# Patient Record
Sex: FEMALE | Race: ASIAN | NOT HISPANIC OR LATINO | Employment: PART TIME | URBAN - METROPOLITAN AREA
[De-identification: names, ages, dates, MRNs, and addresses within clinical notes are randomized per-mention and may not be internally consistent; named-entity substitution may affect disease eponyms.]

---

## 2019-11-11 ENCOUNTER — OFFICE VISIT (OUTPATIENT)
Dept: GASTROENTEROLOGY | Facility: CLINIC | Age: 54
End: 2019-11-11
Payer: COMMERCIAL

## 2019-11-11 VITALS
DIASTOLIC BLOOD PRESSURE: 70 MMHG | WEIGHT: 116.8 LBS | BODY MASS INDEX: 19.94 KG/M2 | SYSTOLIC BLOOD PRESSURE: 118 MMHG | HEART RATE: 56 BPM | HEIGHT: 64 IN

## 2019-11-11 DIAGNOSIS — R05.3 CHRONIC COUGH: ICD-10-CM

## 2019-11-11 DIAGNOSIS — K21.00 GASTROESOPHAGEAL REFLUX DISEASE WITH ESOPHAGITIS: Primary | ICD-10-CM

## 2019-11-11 PROCEDURE — 99203 OFFICE O/P NEW LOW 30 MIN: CPT | Performed by: PHYSICIAN ASSISTANT

## 2019-11-11 RX ORDER — DIPHENOXYLATE HYDROCHLORIDE AND ATROPINE SULFATE 2.5; .025 MG/1; MG/1
1 TABLET ORAL DAILY
COMMUNITY

## 2019-11-11 RX ORDER — PANTOPRAZOLE SODIUM 40 MG/1
40 TABLET, DELAYED RELEASE ORAL DAILY
Qty: 30 TABLET | Refills: 1 | Status: SHIPPED | OUTPATIENT
Start: 2019-11-11 | End: 2022-06-27 | Stop reason: CLARIF

## 2019-11-11 RX ORDER — LATANOPROST 50 UG/ML
1 SOLUTION/ DROPS OPHTHALMIC
COMMUNITY

## 2019-11-11 NOTE — LETTER
November 13, 2019     Barb Robert MD  [address] [facility] [address]    Patient: Eze Billy   YOB: 1965   Date of Visit: 11/11/2019       Dear Dr Alexsandra Scott:    Thank you for referring Eze Billy to me for evaluation  Below are my notes for this consultation  If you have questions, please do not hesitate to call me  I look forward to following your patient along with you  Sincerely,        Vinita Randall PA-C        CC: No Recipients  Maurilio Hernandez  11/11/2019 10:09 AM  Attested  [facility]  Eze Billy 53 y o  female MRN: [id number]       CC:  New patient to establish for possible silent reflux    HPI: Leroy Alford is a 42-year-old female who presents to the office today by referral of her PCP for sour taste, chronic cough and throat clearing  Patient is unsure when the onset of her symptoms began  However, she reports this as a gradual progression  She reports that she has been having a sour taste in her mouth, which she tried taking hydrogen peroxide mouthwash for  Her dentist had told her to discontinue this as he noticed that her enamel was weakening  He also had some suspicion for acid reflux  Patient also reports history of chronic cough and occasional sore throat  She denies pyrosis  She has occasional NSAID use  No dysphagia or odynophagia  No unintentional weight loss  Patient has never had an EGD  Her last colonoscopy was with Dr Mundo Cuello about 3 years ago  To her knowledge, she does not believe that she has a personal history of polyps or family history of colon cancer  Review of Systems:    CONSTITUTIONAL: Denies any fever, chills, or rigors  Good appetite, and no recent weight loss  HEENT: No earache or tinnitus  Denies hearing loss or visual disturbances  CARDIOVASCULAR: No chest pain or palpitations     RESPIRATORY: Denies any cough, hemoptysis, shortness of breath or dyspnea on exertion  GASTROINTESTINAL: As noted in the History of Present Illness  GENITOURINARY: No problems with urination  Denies any hematuria or dysuria  NEUROLOGIC: No dizziness or vertigo, denies headaches  MUSCULOSKELETAL: Denies any muscle or joint pain  SKIN: Denies skin rashes or itching  ENDOCRINE: Denies excessive thirst  Denies intolerance to heat or cold  PSYCHOSOCIAL: Denies depression or anxiety  Denies any recent memory loss  Current Outpatient Medications   Medication Sig Dispense Refill    latanoprost (XALATAN) 0 005 % ophthalmic solution Apply 1 drop to eye      multivitamin (THERAGRAN) TABS Take 1 tablet by mouth daily      pantoprazole (PROTONIX) 40 mg tablet Take 1 tablet (40 mg total) by mouth daily 30 minutes before breakfast 30 tablet 1     No current facility-administered medications for this visit  History reviewed  No pertinent past medical history  History reviewed  No pertinent surgical history    Social History     Socioeconomic History    Marital status: /Civil Union     Spouse name: None    Number of children: None    Years of education: None    Highest education level: None   Occupational History    None   Social Needs    Financial resource strain: None    Food insecurity:     Worry: None     Inability: None    Transportation needs:     Medical: None     Non-medical: None   Tobacco Use    Smoking status: Never Smoker    Smokeless tobacco: Never Used   Substance and Sexual Activity    Alcohol use: Never     Frequency: Never    Drug use: Never    Sexual activity: None   Lifestyle    Physical activity:     Days per week: None     Minutes per session: None    Stress: None   Relationships    Social connections:     Talks on phone: None     Gets together: None     Attends Pentecostalism service: None     Active member of club or organization: None     Attends meetings of clubs or organizations: None     Relationship status: None    Intimate partner violence:     Fear of current or ex partner: None     Emotionally abused: None     Physically abused: None     Forced sexual activity: None   Other Topics Concern    None   Social History Narrative    None     Family History   Problem Relation Age of Onset    No Known Problems Mother     No Known Problems Father             PHYSICAL EXAM:    Vitals:    11/11/19 0909   BP: 118/70   Pulse: 56   Weight: 53 kg (116 lb 12 8 oz)   Height: 5' 4" (1 626 m)     General Appearance:   Alert and oriented x 3  Cooperative, and in no respiratory distress   HEENT:   Normocephalic, atraumatic, anicteric      Neck:  Supple, symmetrical, trachea midline   Lungs:   Clear to auscultation bilaterally    Heart[de-identified]   Regular rate and rhythm   Abdomen:   Soft, non-tender, non-distended; normal bowel sounds; no masses, no organomegaly    Genitalia:   Deferred    Rectal:   Deferred    Extremities:  No cyanosis, clubbing or edema    Pulses:  2+ and symmetric all extremities    Skin:  Skin color, texture, turgor normal, no rashes or lesions    Lymph nodes:  No palpable cervical or supraclavicular lymphadenopathy        Lab Results:             Invalid input(s): LABALBU            Imaging Studies: I have personally reviewed pertinent imaging studies  Patient was never admitted  ASSESSMENT and PLAN:      1) Sour taste, throat clearing and chronic cough - May be secondary to silent GERD symptoms/LPR  Patient also reports that she [place] often  No alarm symptoms currently  - Will start a PPI course x8 weeks  - EGD to investigate, and also biopsy for H pylori  - Reflux precautions discussed  - May need referral to ENT        Follow up after EGD  Attestation signed by Christy Luu MD at 11/11/2019 10:10 AM:  I supervised the Advanced Practitioner  I reviewed the Advanced Practitioner note and agree      Christy Luu MD 11/11/19

## 2019-11-11 NOTE — PROGRESS NOTES
[facility]  Anusha Lalo 53 y o  female MRN: [id number]       CC:  New patient to establish for possible silent reflux    HPI: Jarred Orr is a 76-year-old female who presents to the office today by referral of her PCP for sour taste, chronic cough and throat clearing  Patient is unsure when the onset of her symptoms began  However, she reports this as a gradual progression  She reports that she has been having a sour taste in her mouth, which she tried taking hydrogen peroxide mouthwash for  Her dentist had told her to discontinue this as he noticed that her enamel was weakening  He also had some suspicion for acid reflux  Patient also reports history of chronic cough and occasional sore throat  She denies pyrosis  She has occasional NSAID use  No dysphagia or odynophagia  No unintentional weight loss  Patient has never had an EGD  Her last colonoscopy was with Dr Gladys Birch about 3 years ago  To her knowledge, she does not believe that she has a personal history of polyps or family history of colon cancer  Review of Systems:    CONSTITUTIONAL: Denies any fever, chills, or rigors  Good appetite, and no recent weight loss  HEENT: No earache or tinnitus  Denies hearing loss or visual disturbances  CARDIOVASCULAR: No chest pain or palpitations  RESPIRATORY: Denies any cough, hemoptysis, shortness of breath or dyspnea on exertion  GASTROINTESTINAL: As noted in the History of Present Illness  GENITOURINARY: No problems with urination  Denies any hematuria or dysuria  NEUROLOGIC: No dizziness or vertigo, denies headaches  MUSCULOSKELETAL: Denies any muscle or joint pain  SKIN: Denies skin rashes or itching  ENDOCRINE: Denies excessive thirst  Denies intolerance to heat or cold  PSYCHOSOCIAL: Denies depression or anxiety  Denies any recent memory loss         Current Outpatient Medications   Medication Sig Dispense Refill    latanoprost (XALATAN) 0 005 % ophthalmic solution Apply 1 drop to eye      multivitamin (THERAGRAN) TABS Take 1 tablet by mouth daily      pantoprazole (PROTONIX) 40 mg tablet Take 1 tablet (40 mg total) by mouth daily 30 minutes before breakfast 30 tablet 1     No current facility-administered medications for this visit  History reviewed  No pertinent past medical history  History reviewed  No pertinent surgical history  Social History     Socioeconomic History    Marital status: /Civil Union     Spouse name: None    Number of children: None    Years of education: None    Highest education level: None   Occupational History    None   Social Needs    Financial resource strain: None    Food insecurity:     Worry: None     Inability: None    Transportation needs:     Medical: None     Non-medical: None   Tobacco Use    Smoking status: Never Smoker    Smokeless tobacco: Never Used   Substance and Sexual Activity    Alcohol use: Never     Frequency: Never    Drug use: Never    Sexual activity: None   Lifestyle    Physical activity:     Days per week: None     Minutes per session: None    Stress: None   Relationships    Social connections:     Talks on phone: None     Gets together: None     Attends Confucianism service: None     Active member of club or organization: None     Attends meetings of clubs or organizations: None     Relationship status: None    Intimate partner violence:     Fear of current or ex partner: None     Emotionally abused: None     Physically abused: None     Forced sexual activity: None   Other Topics Concern    None   Social History Narrative    None     Family History   Problem Relation Age of Onset    No Known Problems Mother     No Known Problems Father             PHYSICAL EXAM:    Vitals:    11/11/19 0909   BP: 118/70   Pulse: 56   Weight: 53 kg (116 lb 12 8 oz)   Height: 5' 4" (1 626 m)     General Appearance:   Alert and oriented x 3   Cooperative, and in no respiratory distress   HEENT:   Normocephalic, atraumatic, anicteric      Neck:  Supple, symmetrical, trachea midline   Lungs:   Clear to auscultation bilaterally    Heart[de-identified]   Regular rate and rhythm   Abdomen:   Soft, non-tender, non-distended; normal bowel sounds; no masses, no organomegaly    Genitalia:   Deferred    Rectal:   Deferred    Extremities:  No cyanosis, clubbing or edema    Pulses:  2+ and symmetric all extremities    Skin:  Skin color, texture, turgor normal, no rashes or lesions    Lymph nodes:  No palpable cervical or supraclavicular lymphadenopathy        Lab Results:             Invalid input(s): LABALBU            Imaging Studies: I have personally reviewed pertinent imaging studies  Patient was never admitted  ASSESSMENT and PLAN:      1) Sour taste, throat clearing and chronic cough - May be secondary to silent GERD symptoms/LPR  Patient also reports that she [place] often  No alarm symptoms currently  - Will start a PPI course x8 weeks  - EGD to investigate, and also biopsy for H pylori  - Reflux precautions discussed  - May need referral to ENT        Follow up after EGD

## 2019-11-12 ENCOUNTER — TELEPHONE (OUTPATIENT)
Dept: GASTROENTEROLOGY | Facility: CLINIC | Age: 54
End: 2019-11-12

## 2021-01-26 ENCOUNTER — OFFICE VISIT (OUTPATIENT)
Dept: INTERNAL MEDICINE CLINIC | Facility: CLINIC | Age: 56
End: 2021-01-26
Payer: COMMERCIAL

## 2021-01-26 VITALS
SYSTOLIC BLOOD PRESSURE: 140 MMHG | HEART RATE: 95 BPM | WEIGHT: 122 LBS | DIASTOLIC BLOOD PRESSURE: 90 MMHG | HEIGHT: 65 IN | OXYGEN SATURATION: 98 % | TEMPERATURE: 98 F | BODY MASS INDEX: 20.33 KG/M2

## 2021-01-26 DIAGNOSIS — A46 ERYSIPELAS: Primary | ICD-10-CM

## 2021-01-26 PROCEDURE — 3725F SCREEN DEPRESSION PERFORMED: CPT | Performed by: INTERNAL MEDICINE

## 2021-01-26 PROCEDURE — 1036F TOBACCO NON-USER: CPT | Performed by: INTERNAL MEDICINE

## 2021-01-26 PROCEDURE — 99214 OFFICE O/P EST MOD 30 MIN: CPT | Performed by: INTERNAL MEDICINE

## 2021-01-26 PROCEDURE — 3008F BODY MASS INDEX DOCD: CPT | Performed by: INTERNAL MEDICINE

## 2021-01-26 RX ORDER — SULFAMETHOXAZOLE AND TRIMETHOPRIM 800; 160 MG/1; MG/1
1 TABLET ORAL EVERY 12 HOURS SCHEDULED
Qty: 14 TABLET | Refills: 0 | Status: SHIPPED | OUTPATIENT
Start: 2021-01-26 | End: 2021-02-02

## 2021-01-26 NOTE — PROGRESS NOTES
Assessment/Plan:       Diagnoses and all orders for this visit:    Erysipelas  -     sulfamethoxazole-trimethoprim (BACTRIM DS) 800-160 mg per tablet; Take 1 tablet by mouth every 12 (twelve) hours for 7 days  -     CBC and differential; Future  -     Comprehensive metabolic panel; Future  -     TSH, 3rd generation with Free T4 reflex; Future  -     UA (URINE) with reflex to Scope                Subjective:      Patient ID: Yesenia Oneal is a 63 y o  female  Rather abrupt onset over the past week of a tender facial cellulitis involving both right and left malar areas with out a distinct demarcation with and without a precipitant     No obvious precipitant  No exacerbating or relieving factor  Course has been to slowly get worse over the past week   No systemic symptoms   No prior history of anything similar  No history of autoimmune disease  The following portions of the patient's history were reviewed and updated as appropriate:   She has no past medical history on file  ,  does not have any pertinent problems on file  ,   has no past surgical history on file  ,  family history includes Alzheimer's disease in her father; No Known Problems in her mother  ,   reports that she has never smoked  She has never used smokeless tobacco  She reports that she does not drink alcohol or use drugs  ,  has No Known Allergies     Current Outpatient Medications   Medication Sig Dispense Refill    latanoprost (XALATAN) 0 005 % ophthalmic solution Apply 1 drop to eye      multivitamin (THERAGRAN) TABS Take 1 tablet by mouth daily      pantoprazole (PROTONIX) 40 mg tablet Take 1 tablet (40 mg total) by mouth daily 30 minutes before breakfast (Patient not taking: Reported on 1/26/2021) 30 tablet 1    sulfamethoxazole-trimethoprim (BACTRIM DS) 800-160 mg per tablet Take 1 tablet by mouth every 12 (twelve) hours for 7 days 14 tablet 0     No current facility-administered medications for this visit          Review of Systems   Skin: Positive for rash  All other systems reviewed and are negative  Objective:  Vitals:    01/26/21 1415   BP: 140/90   Pulse: 95   Temp: 98 °F (36 7 °C)   SpO2: 98%      Physical Exam  Constitutional:       Comments:   Alert female patient who appears to be the stated age   HENT:      Right Ear: External ear normal       Left Ear: External ear normal       Nose: Nose normal    Eyes:      General: No scleral icterus  Right eye: No discharge  Left eye: No discharge  Neck:      Musculoskeletal: Normal range of motion  Cardiovascular:      Rate and Rhythm: Normal rate and regular rhythm  Pulmonary:      Effort: Pulmonary effort is normal    Skin:     General: Skin is warm  Findings: Erythema and rash present  Comments:  Skin of the face both right and left of the nose is a bit red, irregular blotching, some raised areas without haily pustules  A bit thickened and indurated without haily discharge  There is noted distinct line of demarcation between normal tissue  This zone of redness extends as far out as the orbit does not reach the orbits themselves  Likewise, the bridge of the nose is not involved   Neurological:      General: No focal deficit present  Mental Status: She is alert  Psychiatric:         Mood and Affect: Mood normal          Judgment: Judgment normal            Patient Instructions    A facial cellulitis  Will treat with Bactrim twice a day    Patient more to go to ER things do not get better   Laboratory testing to look for leukocytosis, diabetes  Virtual visit next Wednesday

## 2021-01-26 NOTE — PATIENT INSTRUCTIONS
A facial cellulitis  Will treat with Bactrim twice a day    Patient more to go to ER things do not get better   Laboratory testing to look for leukocytosis, diabetes  Virtual visit next Wednesday

## 2021-01-27 ENCOUNTER — TELEPHONE (OUTPATIENT)
Dept: ADMINISTRATIVE | Facility: OTHER | Age: 56
End: 2021-01-27

## 2021-01-27 NOTE — TELEPHONE ENCOUNTER
Upon review of the In Basket request and the patient's chart, initial outreach has been made via fax, please see Contacts section for details       Thank you  Margarito Flynn

## 2021-01-27 NOTE — LETTER
Procedure Request Form: Colonoscopy      Date Requested: 21  Patient: Ledell Kins  Patient : 1965   Referring Provider: Mary Pierson, MD        Date of Procedure ______________________________       The above patient has informed us that they have completed their   most recent Colonoscopy at your facility  Please complete   this form and attach all corresponding procedure reports/results  Comments __________________________________________________________  ____________________________________________________________________  ____________________________________________________________________  ____________________________________________________________________    Facility Completing Procedure _________________________________________    Form Completed By (print name) _______________________________________      Signature __________________________________________________________      These reports are needed for  compliance    Please fax this completed form and a copy of the procedure report to our office located at [place] [address] as soon as possible to [phone] karin Cesar: Phone [phone]    We thank you for your assistance in treating our mutual patient

## 2021-01-27 NOTE — TELEPHONE ENCOUNTER
Upon review of the In Basket request we were able to locate, review, and update the patient chart as requested for Mammogram and Pap Smear (HPV) aka Cervical Cancer Screening  Any additional questions or concerns should be emailed to the Practice Liaisons via [email or web address] email, please do not reply via In Basket      Thank you  Francesca Burgos

## 2021-01-27 NOTE — TELEPHONE ENCOUNTER
----- Message from Sebastian Mccartney sent at 1/26/2021  2:20 PM EST -----  Regarding: colonoscopy  01/26/21 2:20 PM    Hello, our patient Min Conway has had CRC: Colonoscopy completed/performed  Please assist in updating the patient chart by making an External outreach to Dr Conchita Chang facility [facility]  The date of service is 1      Thank you,  WOJCIECH Mccartney 63

## 2021-02-04 ENCOUNTER — OFFICE VISIT (OUTPATIENT)
Dept: INTERNAL MEDICINE CLINIC | Facility: CLINIC | Age: 56
End: 2021-02-04
Payer: COMMERCIAL

## 2021-02-04 ENCOUNTER — APPOINTMENT (OUTPATIENT)
Dept: RADIOLOGY | Facility: CLINIC | Age: 56
End: 2021-02-04
Payer: COMMERCIAL

## 2021-02-04 ENCOUNTER — APPOINTMENT (OUTPATIENT)
Dept: LAB | Facility: CLINIC | Age: 56
End: 2021-02-04
Payer: COMMERCIAL

## 2021-02-04 VITALS
SYSTOLIC BLOOD PRESSURE: 116 MMHG | HEART RATE: 101 BPM | DIASTOLIC BLOOD PRESSURE: 72 MMHG | HEIGHT: 64 IN | BODY MASS INDEX: 20.55 KG/M2 | TEMPERATURE: 97.2 F | OXYGEN SATURATION: 97 % | WEIGHT: 120.4 LBS

## 2021-02-04 DIAGNOSIS — R05.3 CHRONIC COUGH: ICD-10-CM

## 2021-02-04 DIAGNOSIS — R73.9 HYPERGLYCEMIA: Primary | ICD-10-CM

## 2021-02-04 DIAGNOSIS — I78.1 TELANGIECTASIAS: ICD-10-CM

## 2021-02-04 DIAGNOSIS — R73.9 HYPERGLYCEMIA: ICD-10-CM

## 2021-02-04 PROBLEM — H35.073 IDIOPATHIC JUXTAFOVEAL RETINAL TELANGIECTASIA OF BOTH EYES: Status: ACTIVE | Noted: 2021-02-04

## 2021-02-04 LAB
APTT PPP: 29 SECONDS (ref 23–37)
EST. AVERAGE GLUCOSE BLD GHB EST-MCNC: 105 MG/DL
HBA1C MFR BLD: 5.3 %
INR PPP: 1.03 (ref 0.84–1.19)
PROTHROMBIN TIME: 13.5 SECONDS (ref 11.6–14.5)

## 2021-02-04 PROCEDURE — 85730 THROMBOPLASTIN TIME PARTIAL: CPT

## 2021-02-04 PROCEDURE — 85610 PROTHROMBIN TIME: CPT

## 2021-02-04 PROCEDURE — 3725F SCREEN DEPRESSION PERFORMED: CPT | Performed by: INTERNAL MEDICINE

## 2021-02-04 PROCEDURE — 71046 X-RAY EXAM CHEST 2 VIEWS: CPT

## 2021-02-04 PROCEDURE — 99214 OFFICE O/P EST MOD 30 MIN: CPT | Performed by: INTERNAL MEDICINE

## 2021-02-04 PROCEDURE — 36415 COLL VENOUS BLD VENIPUNCTURE: CPT

## 2021-02-04 PROCEDURE — 70220 X-RAY EXAM OF SINUSES: CPT

## 2021-02-04 PROCEDURE — 83036 HEMOGLOBIN GLYCOSYLATED A1C: CPT

## 2021-02-04 PROCEDURE — 3008F BODY MASS INDEX DOCD: CPT | Performed by: INTERNAL MEDICINE

## 2021-02-04 RX ORDER — MONTELUKAST SODIUM 10 MG/1
10 TABLET ORAL
Qty: 60 TABLET | Refills: 0 | Status: SHIPPED | OUTPATIENT
Start: 2021-02-04 | End: 2021-04-01

## 2021-02-04 NOTE — PROGRESS NOTES
Assessment/Plan:       Diagnoses and all orders for this visit:    Hyperglycemia  -     Hemoglobin A1C; Future    Chronic cough  -     montelukast (SINGULAIR) 10 mg tablet; Take 1 tablet (10 mg total) by mouth daily at bedtime  -     XR chest pa & lateral; Future  -     XR sinuses routine 3+ views; Future    Telangiectasias  -     Protime-INR; Future  -     APTT; Future                Subjective:      Patient ID: Shira Bruce is a 63 y o  female  Seen a few days ago for a routine visit  Laboratory testing was done and glucose was a bit high so she needs an A1c  However, she came in today referred by Ophthalmology  She had a coughing fit and now she has some dilated capillaries in both eyes without scleral hemorrhage  She has a chronic intermittent cough for many years which has been attributed allergy  She has cats in the house  She has never wheezed  Never had a workup for this  No recent x-rays  No trial of asthmatic drug or allergy prep other than over-the-counter  The following portions of the patient's history were reviewed and updated as appropriate:   She has no past medical history on file  ,  does not have any pertinent problems on file  ,   has no past surgical history on file  ,  family history includes Alzheimer's disease in her father; No Known Problems in her mother  ,   reports that she has never smoked  She has never used smokeless tobacco  She reports that she does not drink alcohol or use drugs  ,  has No Known Allergies     Current Outpatient Medications   Medication Sig Dispense Refill    latanoprost (XALATAN) 0 005 % ophthalmic solution Apply 1 drop to eye      multivitamin (THERAGRAN) TABS Take 1 tablet by mouth daily      montelukast (SINGULAIR) 10 mg tablet Take 1 tablet (10 mg total) by mouth daily at bedtime 60 tablet 0    pantoprazole (PROTONIX) 40 mg tablet Take 1 tablet (40 mg total) by mouth daily 30 minutes before breakfast (Patient not taking: Reported on 1/26/2021) 30 tablet 1     No current facility-administered medications for this visit  Review of Systems   Respiratory: Positive for cough  All other systems reviewed and are negative  Objective:  Vitals:    02/04/21 0928   BP: 116/72   Pulse: 101   Temp: (!) 97 2 °F (36 2 °C)   SpO2: 97%      Physical Exam  Constitutional:       Appearance: Normal appearance  Eyes:      Conjunctiva/sclera: Conjunctivae normal       Comments:  Visible slightly dilated capillaries both  scleral surfaces  No reported visual acuity loss   Cardiovascular:      Rate and Rhythm: Normal rate  Pulmonary:      Effort: Pulmonary effort is normal       Breath sounds: Normal breath sounds  Neurological:      General: No focal deficit present  Mental Status: She is alert and oriented to person, place, and time  Psychiatric:         Mood and Affect: Mood normal          Judgment: Judgment normal            Patient Instructions    Hyperglycemia -check A1c  Chronic cough; attributed to allergies  Check x-ray of chest in paranasal sinuses  Therapeutic trial of Singulair 10 mg daily   Scleral capillaries dilated after an episode of cough  Check PT plus PTT    I think these will be normal; this is at request of ophthalmology     Further recommendations will depend upon results of these initial studies

## 2021-02-04 NOTE — PATIENT INSTRUCTIONS
Hyperglycemia -check A1c  Chronic cough; attributed to allergies  Check x-ray of chest in paranasal sinuses  Therapeutic trial of Singulair 10 mg daily   Scleral capillaries dilated after an episode of cough  Check PT plus PTT    I think these will be normal; this is at request of ophthalmology     Further recommendations will depend upon results of these initial studies

## 2021-02-05 ENCOUNTER — TELEPHONE (OUTPATIENT)
Dept: INTERNAL MEDICINE CLINIC | Facility: CLINIC | Age: 56
End: 2021-02-05

## 2021-02-05 ENCOUNTER — TELEMEDICINE (OUTPATIENT)
Dept: INTERNAL MEDICINE CLINIC | Facility: CLINIC | Age: 56
End: 2021-02-05
Payer: COMMERCIAL

## 2021-02-05 DIAGNOSIS — I78.1 TELANGIECTASIAS: Primary | ICD-10-CM

## 2021-02-05 DIAGNOSIS — J30.0 VASOMOTOR RHINITIS: ICD-10-CM

## 2021-02-05 DIAGNOSIS — R73.9 HYPERGLYCEMIA: ICD-10-CM

## 2021-02-05 PROCEDURE — 1036F TOBACCO NON-USER: CPT | Performed by: INTERNAL MEDICINE

## 2021-02-05 PROCEDURE — 99213 OFFICE O/P EST LOW 20 MIN: CPT | Performed by: INTERNAL MEDICINE

## 2021-02-05 NOTE — PATIENT INSTRUCTIONS
Therapeutic trial of Singulair 10 mg daily   Lipid panel  Follow-up visit in a year or earlier if needed

## 2021-02-05 NOTE — TELEPHONE ENCOUNTER
----- Message from Fer Patel MD sent at 2/5/2021  7:28 AM EST -----  Normal chest x-ray normal sinus x-ray

## 2021-02-05 NOTE — PROGRESS NOTES
Virtual Regular Visit      Assessment/Plan:    Problem List Items Addressed This Visit        Respiratory    Vasomotor rhinitis       Cardiovascular and Mediastinum    Telangiectasias - Primary       Other    Hyperglycemia    Relevant Orders    Lipid Panel with Direct LDL reflex               Reason for visit is   Chief Complaint   Patient presents with    Virtual Regular Visit        Encounter provider Magy Strickland MD    Provider located at [address] [place] [address]      Recent Visits  Date Type Provider Dept   02/04/21 Office Visit Magy Strickland  [facility] recent visits within past 7 days and meeting all other requirements     Today's Visits  Date Type Provider Dept   02/05/21 Telemedicine Magy Strickland  [facility] today's visits and meeting all other requirements     Future Appointments  No visits were found meeting these conditions  Showing future appointments within next 150 days and meeting all other requirements        The patient was identified by name and date of birth  Rex Arambula was informed that this is a telemedicine visit and that the visit is being conducted through [employer] and patient was informed that this is not a secure, HIPAA-compliant platform  She agrees to proceed     My office door was closed  No one else was in the room  She acknowledged consent and understanding of privacy and security of the video platform  The patient has agreed to participate and understands they can discontinue the visit at any time  Patient is aware this is a billable service  Subjective  Rex Arambula is a 63 y o  female    Seen yesterday  The patient had been coughing and developed some telangiectasia of the eye  Ophthalmology wanted a visit and PT PTT  The visit was benign but the patient did report ongoing chronic coughing for decades  Exam was normal     Impression was some kind of allergic disease either postnasal drip or perhaps even cough variant asthma  X-ray of chest in sinus normal       At this point the patient opts for trial of Singulair  She can do that and let me know in a couple of weeks how she feels  No past medical history on file  No past surgical history on file  Current Outpatient Medications   Medication Sig Dispense Refill    latanoprost (XALATAN) 0 005 % ophthalmic solution Apply 1 drop to eye      montelukast (SINGULAIR) 10 mg tablet Take 1 tablet (10 mg total) by mouth daily at bedtime 60 tablet 0    multivitamin (THERAGRAN) TABS Take 1 tablet by mouth daily      pantoprazole (PROTONIX) 40 mg tablet Take 1 tablet (40 mg total) by mouth daily 30 minutes before breakfast (Patient not taking: Reported on 1/26/2021) 30 tablet 1     No current facility-administered medications for this visit  No Known Allergies    Review of Systems   Respiratory: Positive for cough  All other systems reviewed and are negative  Video Exam    There were no vitals filed for this visit  Physical Exam  Constitutional:       Appearance: Normal appearance  Pulmonary:      Effort: Pulmonary effort is normal    Neurological:      General: No focal deficit present  Mental Status: She is alert  Psychiatric:         Mood and Affect: Mood normal          Judgment: Judgment normal           I spent   5 minutes directly with the patient during this visit      VIRTUAL VISIT DISCLAIMER    Froy Felicitas acknowledges that she has consented to an online visit or consultation  She understands that the online visit is based solely on information provided by her, and that, in the absence of a face-to-face physical evaluation by the physician, the diagnosis she receives is both limited and provisional in terms of accuracy and completeness   This is not intended to replace a full medical face-to-face evaluation by the physician  Catalina Meléndez understands and accepts these terms

## 2021-02-09 ENCOUNTER — TRANSCRIBE ORDERS (OUTPATIENT)
Dept: LAB | Facility: CLINIC | Age: 56
End: 2021-02-09

## 2021-02-09 ENCOUNTER — LAB (OUTPATIENT)
Dept: LAB | Facility: CLINIC | Age: 56
End: 2021-02-09
Payer: COMMERCIAL

## 2021-02-09 DIAGNOSIS — L40.3 SAPHO SYNDROME (HCC): ICD-10-CM

## 2021-02-09 DIAGNOSIS — L70.9 SAPHO SYNDROME (HCC): Primary | ICD-10-CM

## 2021-02-09 DIAGNOSIS — M25.542 ARTHRALGIA OF LEFT HAND: ICD-10-CM

## 2021-02-09 DIAGNOSIS — M25.541 ARTHRALGIA OF RIGHT HAND: ICD-10-CM

## 2021-02-09 DIAGNOSIS — L40.3 SAPHO SYNDROME (HCC): Primary | ICD-10-CM

## 2021-02-09 DIAGNOSIS — L70.9 SAPHO SYNDROME (HCC): ICD-10-CM

## 2021-02-09 DIAGNOSIS — M65.9 SAPHO SYNDROME (HCC): Primary | ICD-10-CM

## 2021-02-09 DIAGNOSIS — R73.9 HYPERGLYCEMIA: ICD-10-CM

## 2021-02-09 DIAGNOSIS — M85.80 SAPHO SYNDROME (HCC): Primary | ICD-10-CM

## 2021-02-09 DIAGNOSIS — M85.80 SAPHO SYNDROME (HCC): ICD-10-CM

## 2021-02-09 DIAGNOSIS — R76.8 FALSE POSITIVE SEROLOGICAL TEST FOR SYPHILIS: ICD-10-CM

## 2021-02-09 DIAGNOSIS — M86.9 SAPHO SYNDROME (HCC): Primary | ICD-10-CM

## 2021-02-09 DIAGNOSIS — M65.9 SAPHO SYNDROME (HCC): ICD-10-CM

## 2021-02-09 DIAGNOSIS — M86.9 SAPHO SYNDROME (HCC): ICD-10-CM

## 2021-02-09 LAB
25(OH)D3 SERPL-MCNC: 41.8 NG/ML (ref 30–100)
ALBUMIN SERPL BCP-MCNC: 3.9 G/DL (ref 3.5–5)
ALP SERPL-CCNC: 77 U/L (ref 46–116)
ALT SERPL W P-5'-P-CCNC: 28 U/L (ref 12–78)
ANION GAP SERPL CALCULATED.3IONS-SCNC: 3 MMOL/L (ref 4–13)
AST SERPL W P-5'-P-CCNC: 20 U/L (ref 5–45)
BASOPHILS # BLD AUTO: 0.02 THOUSANDS/ΜL (ref 0–0.1)
BASOPHILS NFR BLD AUTO: 0 % (ref 0–1)
BILIRUB SERPL-MCNC: 0.9 MG/DL (ref 0.2–1)
BUN SERPL-MCNC: 14 MG/DL (ref 5–25)
CALCIUM SERPL-MCNC: 9.2 MG/DL (ref 8.3–10.1)
CHLORIDE SERPL-SCNC: 112 MMOL/L (ref 100–108)
CHOLEST SERPL-MCNC: 225 MG/DL (ref 50–200)
CO2 SERPL-SCNC: 29 MMOL/L (ref 21–32)
CREAT SERPL-MCNC: 0.69 MG/DL (ref 0.6–1.3)
CRP SERPL QL: <3 MG/L
EOSINOPHIL # BLD AUTO: 0.08 THOUSAND/ΜL (ref 0–0.61)
EOSINOPHIL NFR BLD AUTO: 1 % (ref 0–6)
ERYTHROCYTE [DISTWIDTH] IN BLOOD BY AUTOMATED COUNT: 12.9 % (ref 11.6–15.1)
ERYTHROCYTE [SEDIMENTATION RATE] IN BLOOD: 15 MM/HOUR (ref 0–29)
GFR SERPL CREATININE-BSD FRML MDRD: 98 ML/MIN/1.73SQ M
GLUCOSE P FAST SERPL-MCNC: 98 MG/DL (ref 65–99)
HCT VFR BLD AUTO: 43.7 % (ref 34.8–46.1)
HDLC SERPL-MCNC: 56 MG/DL
HGB BLD-MCNC: 14.1 G/DL (ref 11.5–15.4)
IMM GRANULOCYTES # BLD AUTO: 0.02 THOUSAND/UL (ref 0–0.2)
IMM GRANULOCYTES NFR BLD AUTO: 0 % (ref 0–2)
LDLC SERPL CALC-MCNC: 138 MG/DL (ref 0–100)
LYMPHOCYTES # BLD AUTO: 2.25 THOUSANDS/ΜL (ref 0.6–4.47)
LYMPHOCYTES NFR BLD AUTO: 30 % (ref 14–44)
MCH RBC QN AUTO: 29.6 PG (ref 26.8–34.3)
MCHC RBC AUTO-ENTMCNC: 32.3 G/DL (ref 31.4–37.4)
MCV RBC AUTO: 92 FL (ref 82–98)
MONOCYTES # BLD AUTO: 0.36 THOUSAND/ΜL (ref 0.17–1.22)
MONOCYTES NFR BLD AUTO: 5 % (ref 4–12)
NEUTROPHILS # BLD AUTO: 4.73 THOUSANDS/ΜL (ref 1.85–7.62)
NEUTS SEG NFR BLD AUTO: 64 % (ref 43–75)
NRBC BLD AUTO-RTO: 0 /100 WBCS
PLATELET # BLD AUTO: 355 THOUSANDS/UL (ref 149–390)
PMV BLD AUTO: 9.3 FL (ref 8.9–12.7)
POTASSIUM SERPL-SCNC: 3.7 MMOL/L (ref 3.5–5.3)
PROT SERPL-MCNC: 7.3 G/DL (ref 6.4–8.2)
RBC # BLD AUTO: 4.76 MILLION/UL (ref 3.81–5.12)
SODIUM SERPL-SCNC: 144 MMOL/L (ref 136–145)
TRIGL SERPL-MCNC: 157 MG/DL
WBC # BLD AUTO: 7.46 THOUSAND/UL (ref 4.31–10.16)

## 2021-02-09 PROCEDURE — 36415 COLL VENOUS BLD VENIPUNCTURE: CPT | Performed by: INTERNAL MEDICINE

## 2021-02-09 PROCEDURE — 80053 COMPREHEN METABOLIC PANEL: CPT

## 2021-02-09 PROCEDURE — 86140 C-REACTIVE PROTEIN: CPT

## 2021-02-09 PROCEDURE — 85652 RBC SED RATE AUTOMATED: CPT | Performed by: INTERNAL MEDICINE

## 2021-02-09 PROCEDURE — 86430 RHEUMATOID FACTOR TEST QUAL: CPT

## 2021-02-09 PROCEDURE — 82523 COLLAGEN CROSSLINKS: CPT | Performed by: INTERNAL MEDICINE

## 2021-02-09 PROCEDURE — 82306 VITAMIN D 25 HYDROXY: CPT

## 2021-02-09 PROCEDURE — 85025 COMPLETE CBC W/AUTO DIFF WBC: CPT

## 2021-02-09 PROCEDURE — 86200 CCP ANTIBODY: CPT

## 2021-02-09 PROCEDURE — 80061 LIPID PANEL: CPT

## 2021-02-10 LAB
CCP IGA+IGG SERPL IA-ACNC: 18 UNITS (ref 0–19)
RHEUMATOID FACT SER QL LA: NEGATIVE

## 2021-02-12 LAB — COLLAGEN CTX SERPL-MCNC: 273 PG/ML

## 2021-03-10 DIAGNOSIS — Z23 ENCOUNTER FOR IMMUNIZATION: ICD-10-CM

## 2021-04-01 DIAGNOSIS — R05.3 CHRONIC COUGH: ICD-10-CM

## 2021-04-01 RX ORDER — MONTELUKAST SODIUM 10 MG/1
TABLET ORAL
Qty: 90 TABLET | Refills: 3 | Status: SHIPPED | OUTPATIENT
Start: 2021-04-01 | End: 2022-06-27 | Stop reason: CLARIF

## 2021-11-19 ENCOUNTER — HOSPITAL ENCOUNTER (OUTPATIENT)
Dept: PULMONOLOGY | Facility: HOSPITAL | Age: 56
Discharge: HOME | End: 2021-11-19
Payer: COMMERCIAL

## 2021-11-19 DIAGNOSIS — R05.9 COUGH: ICD-10-CM

## 2021-11-19 PROCEDURE — 94070 EVALUATION OF WHEEZING: CPT | Performed by: INTERNAL MEDICINE

## 2021-11-19 PROCEDURE — 94726 PLETHYSMOGRAPHY LUNG VOLUMES: CPT

## 2021-11-19 PROCEDURE — 94729 DIFFUSING CAPACITY: CPT

## 2021-11-19 PROCEDURE — 94060 EVALUATION OF WHEEZING: CPT

## 2021-11-19 PROCEDURE — 94726 PLETHYSMOGRAPHY LUNG VOLUMES: CPT | Performed by: INTERNAL MEDICINE

## 2021-11-19 PROCEDURE — 94729 DIFFUSING CAPACITY: CPT | Performed by: INTERNAL MEDICINE

## 2021-11-19 PROCEDURE — 94760 N-INVAS EAR/PLS OXIMETRY 1: CPT

## 2021-11-19 PROCEDURE — 94070 EVALUATION OF WHEEZING: CPT

## 2021-11-19 RX ORDER — SODIUM CHLORIDE FOR INHALATION 0.9 %
3 VIAL, NEBULIZER (ML) INHALATION ONCE AS NEEDED
Status: DISCONTINUED | OUTPATIENT
Start: 2021-11-19 | End: 2021-11-23 | Stop reason: HOSPADM

## 2021-11-19 RX ORDER — ALBUTEROL SULFATE 2.5 MG/3ML
2.5 SOLUTION RESPIRATORY (INHALATION) ONCE AS NEEDED
Status: COMPLETED | OUTPATIENT
Start: 2021-11-19 | End: 2021-11-19

## 2021-11-19 RX ADMIN — Medication 0.06 MG: at 13:00

## 2021-11-19 RX ADMIN — ALBUTEROL SULFATE 2.5 MG: 2.5 SOLUTION RESPIRATORY (INHALATION) at 13:25

## 2021-12-13 ENCOUNTER — TELEPHONE (OUTPATIENT)
Dept: UROLOGY | Facility: CLINIC | Age: 56
End: 2021-12-13

## 2021-12-22 ENCOUNTER — OFFICE VISIT (OUTPATIENT)
Dept: UROLOGY | Facility: CLINIC | Age: 56
End: 2021-12-22
Payer: COMMERCIAL

## 2021-12-22 VITALS
BODY MASS INDEX: 21 KG/M2 | DIASTOLIC BLOOD PRESSURE: 72 MMHG | SYSTOLIC BLOOD PRESSURE: 112 MMHG | HEIGHT: 64 IN | HEART RATE: 78 BPM | WEIGHT: 123 LBS

## 2021-12-22 DIAGNOSIS — C49.9 LIPOSARCOMA (HCC): ICD-10-CM

## 2021-12-22 DIAGNOSIS — N28.89 RENAL MASS, RIGHT: Primary | ICD-10-CM

## 2021-12-22 PROCEDURE — 3008F BODY MASS INDEX DOCD: CPT | Performed by: UROLOGY

## 2021-12-22 PROCEDURE — 1036F TOBACCO NON-USER: CPT | Performed by: UROLOGY

## 2021-12-22 PROCEDURE — 99205 OFFICE O/P NEW HI 60 MIN: CPT | Performed by: UROLOGY

## 2021-12-22 RX ORDER — LORATADINE 10 MG/1
10 TABLET ORAL AS NEEDED
COMMUNITY
End: 2022-06-27 | Stop reason: CLARIF

## 2022-01-30 NOTE — PROGRESS NOTES
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  - cT1a  - Described as exophytic, posterior, on outside imaging  - Observation favored for liposarcoma, therefore, right robotic partial nephrectomy will be scheduled  - Imaging from outside source is scanned in chart and available for attending to review  - Informed consent signed today  - Heart regular, lungs clear bilaterally, extremities without edema, abdomen soft  - Patient knows she will need urine testing 2 weeks prior to scheduled procedure to ensure she does not have UTI  - Will call with any questions or concerns in the meantime  - All questions answered; patient understands and agrees with plan    2  History of liposarcoma of right shoulder  - s/p recent surgical resection  - 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  - Dr Chaparrita Hewitt evaluated patient 1/24/22 and recommends observation of liposarcoma at this time    3  7 mm pulmonary nodule  - Evaluated by pulmonary with plan for repeat imaging in 3 months      History of Present Illness  Pal Duval is a 61 y o  female patient of Dr Gennaro Wang with history of 1 cm enhancing region on right kidney consistent with early stage RCC, liposarcoma of right shoulder, and 7 mm pulmonary nodule here for follow up and preoperative evaluation  Patient was seen in consultation 12/22/21 with Dr Gennaro Wang  Patient had outside CT showing evidence of 1 cm enhancing right renal mass consistent with early stage RCC  Plan was for patient to follow as scheduled with Dr Chaparrita Hewitt for liposarcoma of right shoulder  Should she not need intervention for this, then she will be scheduled for right robotic partial nephrectomy  Patient saw Dr Chaparrita Hewitt 1/24/22 and was recommended to observe liposarcoma of right shoulder at this time  Patient denies any urinary symptoms today  Denies gross hematuria, flank pain, suprapubic pressure, fever, chills, nausea, vomiting, weight loss, bone pain  CT from outside facility was scanned in chart for review  Patient denies family history of  malignancies  Patient following with pulmonology for 7 mm nodule seen in lung  Plan is to repeat imaging in 3 months  Review of Systems   Constitutional: Negative for activity change, appetite change, chills and fever  HENT: Negative for congestion and trouble swallowing  Respiratory: Negative for cough and shortness of breath  Cardiovascular: Negative for chest pain, palpitations and leg swelling  Gastrointestinal: Negative for abdominal pain, constipation, diarrhea, nausea and vomiting  Genitourinary: Negative for difficulty urinating, dysuria, flank pain, frequency, hematuria and urgency  Musculoskeletal: Negative for back pain and gait problem  Skin: Negative for wound  Allergic/Immunologic: Negative for immunocompromised state  Neurological: Negative for dizziness and syncope  Hematological: Does not bruise/bleed easily  Psychiatric/Behavioral: Negative for confusion  All other systems reviewed and are negative  Vitals  There were no vitals filed for this visit  Physical Exam  Constitutional:       General: She is not in acute distress  Appearance: Normal appearance  She is not ill-appearing, toxic-appearing or diaphoretic  HENT:      Head: Normocephalic  Nose: No congestion  Eyes:      General: No scleral icterus  Right eye: No discharge  Left eye: No discharge  Conjunctiva/sclera: Conjunctivae normal       Pupils: Pupils are equal, round, and reactive to light  Cardiovascular:      Rate and Rhythm: Normal rate and regular rhythm  Heart sounds: No murmur heard  No friction rub  No gallop  Pulmonary:      Effort: Pulmonary effort is normal  No respiratory distress  Breath sounds: Normal breath sounds  No stridor  No wheezing, rhonchi or rales  Abdominal:      Palpations: Abdomen is soft  Tenderness: There is no right CVA tenderness or left CVA tenderness  Musculoskeletal:         General: No swelling  Cervical back: Normal range of motion  Right lower leg: No edema  Left lower leg: No edema  Skin:     General: Skin is warm and dry  Coloration: Skin is not jaundiced or pale  Findings: No bruising, erythema, lesion or rash  Neurological:      General: No focal deficit present  Mental Status: She is alert and oriented to person, place, and time  Mental status is at baseline  Gait: Gait normal    Psychiatric:         Mood and Affect: Mood normal          Behavior: Behavior normal          Thought Content:  Thought content normal          Judgment: Judgment normal            Past History  Past Medical History:   Diagnosis Date    Renal lesion      Social History     Socioeconomic History    Marital status: /Civil Union     Spouse name: None    Number of children: None    Years of education: None    Highest education level: None   Occupational History    None   Tobacco Use    Smoking status: Never Smoker    Smokeless tobacco: Never Used   Vaping Use    Vaping Use: Never used   Substance and Sexual Activity    Alcohol use: Never    Drug use: Never    Sexual activity: Yes   Other Topics Concern    None   Social History Narrative    None     Social Determinants of Health     Financial Resource Strain: Not on file   Food Insecurity: Not on file   Transportation Needs: Not on file   Physical Activity: Not on file   Stress: Not on file   Social Connections: Not on file   Intimate Partner Violence: Not on file   Housing Stability: Not on file     Social History     Tobacco Use   Smoking Status Never Smoker   Smokeless Tobacco Never Used     Family History   Problem Relation Age of Onset    No Known Problems Mother    Juli Russell Alzheimer's disease Father     No Known Problems Sister     No Known Problems Daughter        The following portions of the patient's history were reviewed and updated as appropriate: allergies, current medications, past medical history, past social history, past surgical history and problem list     Results  No results found for this or any previous visit (from the past 1 hour(s))  ]  No results found for: PSA  Lab Results   Component Value Date    CALCIUM 9 2 02/09/2021    K 3 7 02/09/2021    CO2 29 02/09/2021     (H) 02/09/2021    BUN 14 02/09/2021    CREATININE 0 69 02/09/2021     Lab Results   Component Value Date    WBC 7 46 02/09/2021    HGB 14 1 02/09/2021    HCT 43 7 02/09/2021    MCV 92 02/09/2021     02/09/2021       Luis Olea PA-C

## 2022-02-03 ENCOUNTER — OFFICE VISIT (OUTPATIENT)
Dept: UROLOGY | Facility: CLINIC | Age: 57
End: 2022-02-03
Payer: COMMERCIAL

## 2022-02-03 VITALS
SYSTOLIC BLOOD PRESSURE: 118 MMHG | WEIGHT: 122.8 LBS | DIASTOLIC BLOOD PRESSURE: 60 MMHG | HEIGHT: 64 IN | BODY MASS INDEX: 20.96 KG/M2 | OXYGEN SATURATION: 98 % | HEART RATE: 90 BPM | RESPIRATION RATE: 16 BRPM

## 2022-02-03 DIAGNOSIS — N28.89 RENAL MASS, RIGHT: Primary | ICD-10-CM

## 2022-02-03 PROCEDURE — 3008F BODY MASS INDEX DOCD: CPT | Performed by: PHYSICIAN ASSISTANT

## 2022-02-03 PROCEDURE — 1036F TOBACCO NON-USER: CPT | Performed by: PHYSICIAN ASSISTANT

## 2022-02-03 PROCEDURE — 99213 OFFICE O/P EST LOW 20 MIN: CPT | Performed by: PHYSICIAN ASSISTANT

## 2022-02-07 ENCOUNTER — PREP FOR PROCEDURE (OUTPATIENT)
Dept: UROLOGY | Facility: CLINIC | Age: 57
End: 2022-02-07

## 2022-02-07 ENCOUNTER — TELEPHONE (OUTPATIENT)
Dept: UROLOGY | Facility: CLINIC | Age: 57
End: 2022-02-07

## 2022-02-07 DIAGNOSIS — N28.89 RENAL MASS, RIGHT: Primary | ICD-10-CM

## 2022-02-07 NOTE — TELEPHONE ENCOUNTER
Patient is scheduled for 5/2 with ZG and DV to assist at [facility]  Patient is aware the hospital will call the day prior with time of arrival and she will stay over night, she will need a , nothing to eat or drink after midnight and to hold blood thinning medications 7 days prior to surgery  She will go for pre op labs, urine culture 2 weeks prior to surgery       Mailing surgical pkt per patient request

## 2022-04-15 ENCOUNTER — APPOINTMENT (OUTPATIENT)
Dept: LAB | Facility: HOSPITAL | Age: 57
End: 2022-04-15
Payer: COMMERCIAL

## 2022-04-15 DIAGNOSIS — N28.89 RENAL MASS, RIGHT: Primary | ICD-10-CM

## 2022-04-15 LAB
ALBUMIN SERPL BCP-MCNC: 3.8 G/DL (ref 3.5–5)
ALP SERPL-CCNC: 82 U/L (ref 46–116)
ALT SERPL W P-5'-P-CCNC: 26 U/L (ref 12–78)
ANION GAP SERPL CALCULATED.3IONS-SCNC: 9 MMOL/L (ref 4–13)
APTT PPP: 33 SECONDS (ref 23–37)
AST SERPL W P-5'-P-CCNC: 20 U/L (ref 5–45)
BASOPHILS # BLD AUTO: 0.02 THOUSANDS/ΜL (ref 0–0.1)
BASOPHILS NFR BLD AUTO: 0 % (ref 0–1)
BILIRUB SERPL-MCNC: 0.66 MG/DL (ref 0.2–1)
BUN SERPL-MCNC: 24 MG/DL (ref 5–25)
CALCIUM SERPL-MCNC: 8.9 MG/DL (ref 8.3–10.1)
CHLORIDE SERPL-SCNC: 105 MMOL/L (ref 100–108)
CO2 SERPL-SCNC: 27 MMOL/L (ref 21–32)
CREAT SERPL-MCNC: 0.71 MG/DL (ref 0.6–1.3)
EOSINOPHIL # BLD AUTO: 0.1 THOUSAND/ΜL (ref 0–0.61)
EOSINOPHIL NFR BLD AUTO: 1 % (ref 0–6)
ERYTHROCYTE [DISTWIDTH] IN BLOOD BY AUTOMATED COUNT: 13.2 % (ref 11.6–15.1)
GFR SERPL CREATININE-BSD FRML MDRD: 94 ML/MIN/1.73SQ M
GLUCOSE SERPL-MCNC: 86 MG/DL (ref 65–140)
HCT VFR BLD AUTO: 42.9 % (ref 34.8–46.1)
HGB BLD-MCNC: 14 G/DL (ref 11.5–15.4)
IMM GRANULOCYTES # BLD AUTO: 0.01 THOUSAND/UL (ref 0–0.2)
IMM GRANULOCYTES NFR BLD AUTO: 0 % (ref 0–2)
INR PPP: 1.07 (ref 0.84–1.19)
LYMPHOCYTES # BLD AUTO: 1.77 THOUSANDS/ΜL (ref 0.6–4.47)
LYMPHOCYTES NFR BLD AUTO: 25 % (ref 14–44)
MCH RBC QN AUTO: 30.2 PG (ref 26.8–34.3)
MCHC RBC AUTO-ENTMCNC: 32.6 G/DL (ref 31.4–37.4)
MCV RBC AUTO: 93 FL (ref 82–98)
MONOCYTES # BLD AUTO: 0.59 THOUSAND/ΜL (ref 0.17–1.22)
MONOCYTES NFR BLD AUTO: 8 % (ref 4–12)
NEUTROPHILS # BLD AUTO: 4.73 THOUSANDS/ΜL (ref 1.85–7.62)
NEUTS SEG NFR BLD AUTO: 66 % (ref 43–75)
NRBC BLD AUTO-RTO: 0 /100 WBCS
PLATELET # BLD AUTO: 326 THOUSANDS/UL (ref 149–390)
PMV BLD AUTO: 9.2 FL (ref 8.9–12.7)
POTASSIUM SERPL-SCNC: 3.8 MMOL/L (ref 3.5–5.3)
PROT SERPL-MCNC: 7.3 G/DL (ref 6.4–8.2)
PROTHROMBIN TIME: 13.5 SECONDS (ref 11.6–14.5)
RBC # BLD AUTO: 4.64 MILLION/UL (ref 3.81–5.12)
SODIUM SERPL-SCNC: 141 MMOL/L (ref 136–145)
WBC # BLD AUTO: 7.22 THOUSAND/UL (ref 4.31–10.16)

## 2022-04-15 PROCEDURE — 86920 COMPATIBILITY TEST SPIN: CPT

## 2022-04-15 PROCEDURE — 85610 PROTHROMBIN TIME: CPT

## 2022-04-15 PROCEDURE — 85025 COMPLETE CBC W/AUTO DIFF WBC: CPT

## 2022-04-15 PROCEDURE — 85730 THROMBOPLASTIN TIME PARTIAL: CPT

## 2022-04-15 PROCEDURE — 36415 COLL VENOUS BLD VENIPUNCTURE: CPT

## 2022-04-15 PROCEDURE — 80053 COMPREHEN METABOLIC PANEL: CPT

## 2022-04-15 PROCEDURE — 87086 URINE CULTURE/COLONY COUNT: CPT

## 2022-04-15 NOTE — TELEPHONE ENCOUNTER
Patient called stating she has questions about the surgery scheduled for 05/02/22 and she would like to speak to the doctor about it  She is would like to know if she can have an appointment to go over all the questions for Dr Ivis Meadows  She stated she has a lot of concerns and she does not want to miss the call if he tries to reach her by phone   Please review and advise     She can be reached at

## 2022-04-15 NOTE — PRE-PROCEDURE INSTRUCTIONS
Pre-Surgery Instructions:   Medication Instructions    Cetirizine HCl 10 MG CAPS Take day of surgery  with sip of water if needed     fluticasone (VERAMYST) 27 5 MCG/SPRAY nasal spray Take day of surgery   latanoprost (XALATAN) 0 005 % ophthalmic solution Take per normal schedule - take night before surgery     multivitamin (THERAGRAN) TABS Stop taking 7 days prior to surgery  per anes guidelines      Reviewed all medications and instructions for DOS  Reviewed all showering instructions and COVID visitation policy  Pt aware that [facility]  is location for [facility], instructed that pt pre op nurse will call on 4/29/22 to give specific instructions for DOS  Pt instructed to bring photo ID and insurance card for DOS, remove all jewelry and  NO valuables for DOS  Pt instructed to use only Tylenol 7 days prior to  DOS, NO NSAID products  Pt informed transport is needed for DOS due to receiving anesthesia  Pt verbalized understanding of all instructions given and reviewed for DOS  Pt did have specific questions regarding instructions prior to surgery - and questions related to surgery -instructed pt to call surgeon office for questions/concerns to be answered  My Surgical Experience    The following information was developed to assist you to prepare for your operation  What do I need to do before coming to the hospital?   Arrange for a responsible person to drive you to and from the hospital    Arrange care for your children at home  Children are not allowed in the recovery areas of the hospital   Plan to wear clothing that is easy to put on and take off  If you are having shoulder surgery, wear a shirt that buttons or zippers in the front  Bathing  o Shower the evening before and the morning of your surgery with an antibacterial soap   Please refer to the Pre Op Showering Instructions for Surgery Patients Sheet   o Remove nail polish and all body piercing jewelry  o Do not shave any body part for at least 24 hours before surgery-this includes face, arms, legs and upper body  Food  o Nothing to eat or drink after midnight the night before your surgery  This includes candy and chewing gum  o Exception: If your surgery is after 12:00pm (noon), you may have clear liquids such as 7-Up®, ginger ale, apple or cranberry juice, Jell-O®, water, or clear broth until 8:00 am  o Do not drink milk or juice with pulp on the morning before surgery  o Do not drink alcohol 24 hours before surgery  Medicine  o Follow instructions you received from your surgeon about which medicines you may take on the day of surgery  o If instructed to take medicine on the morning of surgery, take pills with just a small sip of water  Call your prescribing doctor for specific infroamtion on what to do if you take insulin    What should I bring to the hospital?    Bring:  Vasquez Aysha or a walker, if you have them, for foot or knee surgery   A list of the daily medicines, vitamins, minerals, herbals and nutritional supplements you take  Include the dosages of medicines and the time you take them each day   Glasses, dentures or hearing aids   Minimal clothing; you will be wearing hospital sleepwear   Photo ID; required to verify your identity   If you have a Living Will or Power of , bring a copy of the documents   If you have an ostomy, bring an extra pouch and any supplies you use    Do not bring   Medicines or inhalers   Money, valuables or jewelry    What other information should I know about the day of surgery?  Notify your surgeons if you develop a cold, sore throat, cough, fever, rash or any other illness   Report to the Ambulatory Surgical/Same Day Surgery Unit   You will be instructed to stop at Registration only if you have not been pre-registered   Inform your  fi they do not stay that they will be asked by the staff to leave a phone number where they can be reached   Be available to be reached before surgery   In the event the operating room schedule changes, you may be asked to come in earlier or later than expected    *It is important to tell your doctor and others involved in your health care if you are taking or have been taking any non-prescription drugs, vitamins, minerals, herbals or other nutritional supplements   Any of these may interact with some food or medicines and cause a reaction

## 2022-04-16 LAB — BACTERIA UR CULT: NORMAL

## 2022-04-19 NOTE — TELEPHONE ENCOUNTER
Called and spoke to patient  Patient stated she had many questions regarding upcoming surgery  Patient is scheduled for a partial nephrectomy  Patient questioned the time patient will going into the hospital and how long surgery is  Informed patient the hospital will call the day before to give that information  Informed patient that there is a lot of pre-op staging prior to surgery  Patient questioned how long she will be staying in hospital  Informed patient it's all dependant on how the surgery goes typically 1-2 nights in the hospital      Patient asked about post-op care and did review that she is restricted of lifting, strenuous activity for 6 weeks  Educated patient on showering avoiding NSAID's and staying hydrated with water  Patient questioned how much kidney function she will lose and how much of the kidney they are taking out  Informed patient that I cannot give any specifics on that until Dr Beryle Kansas actually goes in for the surgery  Patient stated she wanted someone else to find out and call her back  Reviewed all patient's questions with Weeleo  Chuyita Das also provided patient with same answers at last office visit  Called and spoke to patient again  Informed patient that Weeleo agrees with the recommends that I also provided  Patient was thankful for call and verbalized understanding

## 2022-04-28 ENCOUNTER — OFFICE VISIT (OUTPATIENT)
Dept: DERMATOLOGY | Facility: CLINIC | Age: 57
End: 2022-04-28
Payer: COMMERCIAL

## 2022-04-28 VITALS — BODY MASS INDEX: 20.83 KG/M2 | HEIGHT: 64 IN | TEMPERATURE: 97.6 F | WEIGHT: 122 LBS

## 2022-04-28 DIAGNOSIS — L85.3 XEROSIS OF SKIN: ICD-10-CM

## 2022-04-28 DIAGNOSIS — D22.9 MULTIPLE MELANOCYTIC NEVI: ICD-10-CM

## 2022-04-28 DIAGNOSIS — D23.9 DERMATOFIBROMA: ICD-10-CM

## 2022-04-28 DIAGNOSIS — D18.01 CHERRY ANGIOMA: Primary | ICD-10-CM

## 2022-04-28 PROCEDURE — 99203 OFFICE O/P NEW LOW 30 MIN: CPT | Performed by: DERMATOLOGY

## 2022-04-28 NOTE — PROGRESS NOTES
[facility] Note     Patient Name: Rex Arambula  Encounter Date: 2022     Have you been cared for by a [facility] Dermatologist in the last 3 years and, if so, which one? No    · Have you traveled outside of the [address] [place] [address] in the past 3 months or outside of the [facility] area in the last 2 weeks? No     May we call your Preferred Phone number to discuss your specific medical information? Yes     May we leave a detailed message that includes your specific medical information? Yes      Today's Chief Concerns:   Concern #1:  Spot of concern   Concern #2:      Past Medical History:  Have you personally ever had or currently have any of the following? · Skin cancer (such as Melanoma, Basal Cell Carcinoma, Squamous Cell Carcinoma? (If Yes, please provide more detail)- No  · Eczema: No  · Psoriasis: No  · HIV/AIDS: No  · Hepatitis B or C: No  · Tuberculosis: No  · Systemic Immunosuppression such as Diabetes, Biologic or Immunotherapy, Chemotherapy, Organ Transplantation, Bone Marrow Transplantation (If YES, please provide more detail): No  · Radiation Treatment (If YES, please provide more detail): No  · Any other major medical conditions/concerns? 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 Family History:  Have any of your "first degree relatives" (parent, brother, sister, or child) had any of the following       · Skin cancer such as Melanoma or Merkel Cell Carcinoma or Pancreatic Cancer? YES, Mother: unsure of what type  · Eczema, Asthma, Hay Fever or Seasonal Allergies: YES, Daughter: Eczema as a   · Psoriasis or Psoriatic Arthritis: No  · Do any other medical conditions seem to run in your family? If Yes, what condition and which relatives?   YES, Father: Alzheimer's    Current Medications:   (please update all dermatological medications before printing patient's AVS!)      Current Outpatient Medications:     Cetirizine HCl 10 MG CAPS, Take 10 mg by mouth if needed, Disp: , Rfl:     fluticasone (VERAMYST) 27 5 MCG/SPRAY nasal spray, 2 sprays into each nostril as needed, Disp: , Rfl:     latanoprost (XALATAN) 0 005 % ophthalmic solution, Administer 1 drop to both eyes daily at bedtime  , Disp: , Rfl:     multivitamin (THERAGRAN) TABS, Take 1 tablet by mouth daily, Disp: , Rfl:     loratadine (CLARITIN) 10 mg tablet, Take 10 mg by mouth as needed (Patient not taking: Reported on 4/28/2022 ), Disp: , Rfl:     montelukast (SINGULAIR) 10 mg tablet, TAKE 1 TABLET BY MOUTH DAILY AT BEDTIME (Patient not taking: Reported on 12/22/2021), Disp: 90 tablet, Rfl: 3    pantoprazole (PROTONIX) 40 mg tablet, Take 1 tablet (40 mg total) by mouth daily 30 minutes before breakfast (Patient not taking: Reported on 4/28/2022 ), Disp: 30 tablet, Rfl: 1      Review of Systems:  Have you recently had or currently have any of the following? If YES, what are you doing for the problem? · Fever, chills or unintended weight loss: No  · Sudden loss or change in your vision: No  · Nausea, vomiting or blood in your stool: No  · Painful or swollen joints: No  · Wheezing or cough: YES, Allergies  · Changing mole or non-healing wound: YES, blisters on face  · Nosebleeds: No  · Excessive sweating: No  · Easy or prolonged bleeding? No  · Over the last 2 weeks, how often have you been bothered by the following problems? · Taking little interest or pleasure in doing things: 1 - Not at All  · Feeling down, depressed, or hopeless: 1 - Not at All  · Rapid heartbeat with epinephrine:  No    · FEMALES ONLY:    · Are you pregnant or planning to become pregnant? No  · Are you currently or planning to be nursing or breast feeding? No    · Any known allergies?       Allergies   Allergen Reactions    Other Sneezing     Cats, dogs, seasonal - pt reports hard to breath and coughing with these allergies          Physical Exam:     Was a chaperone (Derm Clinical Assistant) present throughout the entire Physical Exam? Yes     Did the Dermatology Team specifically  the patient on the importance of a Full Skin Exam to be sure that nothing is missed clinically?  Yes}  o Did the patient ultimately request or accept a Full Skin Exam?  Yes  o Did the patient specifically refuse to have the areas "under-the-bra" examined by the Dermatologist? No  o Did the patient specifically refuse to have the areas "under-the-underwear" examined by the Dermatologist? No    CONSTITUTIONAL:   Vitals:    04/28/22 0849   Temp: 97 6 °F (36 4 °C)   Weight: 55 3 kg (122 lb)   Height: 5' 4" (1 626 m)         PSYCH: Normal mood and affect  EYES: Normal conjunctiva  ENT: Normal lips and oral mucosa  CARDIOVASCULAR: No edema  RESPIRATORY: Normal respirations  HEME/LYMPH/IMMUNO:  No regional lymphadenopathy except as noted below in "ASSESSMENT AND PLAN BY DIAGNOSIS"    SKIN:  FULL ORGAN SYSTEM EXAM   Hair, Scalp, Ears, Face Normal except as noted below in Assessment   Neck, Cervical Chain Nodes Normal except as noted below in Assessment   Right Arm/Hand/Fingers Normal except as noted below in Assessment   Left Arm/Hand/Fingers Normal except as noted below in Assessment   Chest/Breasts/Axillae Viewed areas Normal except as noted below in Assessment   Abdomen, Umbilicus Normal except as noted below in Assessment   Back/Spine Normal except as noted below in Assessment   Groin/Genitalia/Buttocks Normal except as noted below in Assessment   Right Leg, Foot, Toes Normal except as noted below in Assessment   Left Leg, Foot, Toes Normal except as noted below in Assessment        Assessment and Plan by Diagnosis:    History of Present Condition:     Duration:  How long has this been an issue for you?    o  about one year   Location Affected:  Where on the body is this affecting you?    o  face   Quality:  Is there any bleeding, pain, itch, burning/irritation, or redness associated with the skin lesion? o  blisters, itch   Severity:  Describe any bleeding, pain, itch, burning/irritation, or redness on a scale of 1 to 10 (with 10 being the worst)  o  No   Timing:  Does this condition seem to be there pretty constantly or do you notice it more at specific times throughout the day?    o  Comes and goes   Context:  Have you ever noticed that this condition seems to be associated with specific activities you do?    o  Hormonal    Modifying Factors:    o Anything that seems to make the condition worse?    -  No  o What have you tried to do to make the condition better? -  Antibiotics, alcohol   Associated Signs and Symptoms:  Does this skin lesion seem to be associated with any of the following:  o  blisters, itchy    MELANOCYTIC NEVI ("Moles")    Physical Exam:   Anatomic Location Affected:   Mostly on sun-exposed areas of the trunk and extremities   Morphological Description:  Scattered, 1-4mm round to ovoid, symmetrical-appearing, even bordered, skin colored to dark brown macules/papules, mostly in sun-exposed areas   Pertinent Positives:   Pertinent Negatives: Additional History of Present Condition:  Found on exam today    Assessment and Plan:  Based on a thorough discussion of this condition and the management approach to it (including a comprehensive discussion of the known risks, side effects and potential benefits of treatment), the patient (family) agrees to implement the following specific plan:   When outside we recommend using a wide brim hat, sunglasses, long sleeve and pants, sunscreen with SPF 13+ with reapplication every 2 hours, or SPF specific clothing    Benign, reassured   Annual skin check     Melanocytic Nevi  Melanocytic nevi ("moles") are tan or brown, raised or flat areas of the skin which have an increased number of melanocytes   Melanocytes are the cells in our body which make pigment and account for skin color  Some moles are present at birth (I e , "congenital nevi"), while others come up later in life (i e , "acquired nevi")  The sun can stimulate the body to make more moles  Sunburns are not the only thing that triggers more moles  Chronic sun exposure can do it too  Clinically distinguishing a healthy mole from melanoma may be difficult, even for experienced dermatologists  The "ABCDE's" of moles have been suggested as a means of helping to alert a person to a suspicious mole and the possible increased risk of melanoma  The suggestions for raising alert are as follows:    Asymmetry: Healthy moles tend to be symmetric, while melanomas are often asymmetric  Asymmetry means if you draw a line through the mole, the two halves do not match in color, size, shape, or surface texture  Asymmetry can be a result of rapid enlargement of a mole, the development of a raised area on a previously flat lesion, scaling, ulceration, bleeding or scabbing within the mole  Any mole that starts to demonstrate "asymmetry" should be examined promptly by a board certified dermatologist      Border: Healthy moles tend to have discrete, even borders  The border of a melanoma often blends into the normal skin and does not sharply delineate the mole from normal skin  Any mole that starts to demonstrate "uneven borders" should be examined promptly by a board certified dermatologist      Color: Healthy moles tend to be one color throughout  Melanomas tend to be made up of different colors ranging from dark black, blue, white, or red  Any mole that demonstrates a color change should be examined promptly by a board certified dermatologist      Diameter: Healthy moles tend to be smaller than 0 6 cm in size; an exception are "congenital nevi" that can be larger  Melanomas tend to grow and can often be greater than 0 6 cm (1/4 of an inch, or the size of a pencil eraser)   This is only a guideline, and many normal moles may be larger than 0 6 cm without being unhealthy  Any mole that starts to change in size (small to bigger or bigger to smaller) should be examined promptly by a board certified dermatologist      Evolving: Healthy moles tend to "stay the same "  Melanomas may often show signs of change or evolution such as a change in size, shape, color, or elevation  Any mole that starts to itch, bleed, crust, burn, hurt, or ulcerate or demonstrate a change or evolution should be examined promptly by a board certified dermatologist         Liliana Cabezas    Physical Exam:   Anatomic Location Affected:  trunk   Morphological Description:  Scattered cherry red, 1-4 mm papules   Pertinent Positives:   Pertinent Negatives: Additional History of Present Condition:  Found on exam today    Assessment and Plan:  Based on a thorough discussion of this condition and the management approach to it (including a comprehensive discussion of the known risks, side effects and potential benefits of treatment), the patient (family) agrees to implement the following specific plan:   Monitor for changes   Benign, reassured    Assessment and Plan:    Cherry angioma, also known as Shelva Strickland spots, are benign vascular skin lesions  A "cherry angioma" is a firm red, blue or purple papule, 0 1-1 cm in diameter  When thrombosed, they can appear black in colour until evaluated with a dermatoscope when the red or purple colour is more easily seen  Cherry angioma may develop on any part of the body but most often appear on the scalp, face, lips and trunk  An angioma is due to proliferating endothelial cells; these are the cells that line the inside of a blood vessel  Angiomas can arise in early life or later in life; the most common type of angioma is a cherry angioma  Cherry angiomas are very common in males and females of any age or race  They are more noticeable in white skin than in skin of colour   They markedly increase in number from about the age of 36  There may be a family history of similar lesions  Eruptive cherry angiomas have been rarely reported to be associated with internal malignancy  The cause of angiomas is unknown  Genetic analysis of cherry angiomas has shown that they frequently carry specific somatic missense mutations in the GNAQ and GNA11 (Q209H) genes, which are involved in other vascular and melanocytic proliferations  XEROSIS ("DRY SKIN")    Physical Exam:   Anatomic Location Affected:  diffuse   Morphological Description:  xerosis   Pertinent Positives:   Pertinent Negatives: Additional History of Present Condition:  Found on exam today    Assessment and Plan:  Based on a thorough discussion of this condition and the management approach to it (including a comprehensive discussion of the known risks, side effects and potential benefits of treatment), the patient (family) agrees to implement the following specific plan:   Use moisturizer like Eucerin,Cerave or Aveeno Cream 3 times a day for the dry skin               Dry skin refers to skin that feels dry to touch  Dry skin has a dull surface with a rough, scaly quality  The skin is less pliable and cracked  When dryness is severe, the skin may become inflamed and fissured  Although any body site can be dry, dry skin tends to affect the shins more than any other site  Dry skin is lacking moisture in the outer horny cell layer (stratum corneum) and this results in cracks in the skin surface  Dry skin is also called xerosis, xeroderma or asteatosis (lack of fat)  It can affect males and females of all ages  There is some racial variability in water and lipid content of the skin   Dry skin that starts in early childhood may be one of about 20 types of ichthyosis (fish-scale skin)  There is often a family history of dry skin   Dry skin is commonly seen in people with atopic dermatitis   Nearly everyone > 60 years has dry skin      Dry skin that begins later may be seen in people with certain diseases and conditions   Postmenopausal women   Hypothyroidism   Chronic renal disease    Malnutrition and weight loss    Subclinical dermatitis    Treatment with certain drugs such as oral retinoids, diuretics and epidermal growth factor receptor inhibitors      What is the treatment for dry skin? The mainstay of treatment of dry skin and ichthyosis is moisturisers/emollients  They should be applied liberally and often enough to:   Reduce itch    Improve the barrier function    Prevent entry of irritants, bacteria    Reduce transepidermal water loss  How can dry skin be prevented? Eliminate aggravating factors:   Reduce the frequency of bathing   A humidifier in winter and air conditioner in summer    Compare having a short shower with a prolonged soak in a bath   Use lukewarm, not hot, water   Replace standard soap with a substitute such as a synthetic detergent cleanser, water-miscible emollient, bath oil, anti-pruritic tar oil, colloidal oatmeal etc     Apply an emollient liberally and often, particularly shortly after bathing, and when itchy  The drier the skin, the thicker this should be, especially on the hands  What is the outlook for dry skin? A tendency to dry skin may persist life-long, or it may improve once contributing factors are controlled  DERMATOFIBROMA    Physical Exam:   Anatomic Location Affected:  Right forearm   Morphological Description:  Brown papule   Pertinent Positives:   Pertinent Negatives:     Additional History of Present Condition:  Found on exam today    Assessment and Plan:  Based on a thorough discussion of this condition and the management approach to it (including a comprehensive discussion of the known risks, side effects and potential benefits of treatment), the patient (family) agrees to implement the following specific plan:   Benign, assurance provided    Assessment and Plan:  A dermatofibroma is a common benign fibrous nodule that most often arises on the skin of the lower legs  A dermatofibroma is also called a "cutaneous fibrous histiocytoma "  Dermatofibromas occur at all ages and in people of every ethnicity  They are more common in women than in men  It is not clear if dermatofibroma is a reactive process or if it is a neoplasm  The lesions are made up of proliferating fibroblasts  Histiocytes may also be involved  They are sometimes attributed to an insect bite or ingrownhair or local trauma, but not consistently  They may be more numerous in patients with altered immunity  Dermatofibromas most often occur on the legs and arms, but may also arise on the trunk or any site of the body  Typical clinical features include the following:   People may have 1 or up to 15 lesions   Size varies from 0 5-1 5 cm diameter; most lesions are 7-10 mm diameter   They are firm nodules tethered to the skin surface and mobile over subcutaneous tissue   The skin "dimples" on pinching the lesion   Color may be pink to light brown in white skin, and dark brown to black in dark skin; some appear paler in the center   They do not usually cause symptoms, but they are sometimes painful or itchy   Because they are often raised lesions, they may be traumatized, for example by a razor   Occasionally dozens may erupt within a few months, usually in the setting of immunosuppression (for example autoimmune disease, cancer or certain medications)   Dermatofibroma does not give rise to cancer  However, occasionally, it may be mistaken for dermatofibrosarcoma or desmoplastic melanoma  A dermatofibroma is harmless and seldom causes any symptoms  Usually, only reassurance is needed  If it is nuisance or causing concern, the lesion can be removed surgically, resulting in a scar that is, by definition, usually longer in diameter than the widest portion of the dermatofibroma    Cryotherapy, shave biopsy and laser surgery are rarely completely successful  Skin punch biopsy or incisional biopsy may be undertaken if there is an atypical feature such as recent enlargement, ulceration, or asymmetrical structures and colours on dermatoscopy  RASH-POSSIBLE ACNE    Physical Exam:   (Anatomic Location); (Size and Morphological Description); (Differential Diagnosis):  o A; Face with comedone and pink papules    Pertinent Positives:   Pertinent Negatives: Additional History of Present Condition:  Present for one year  Described as blisters and itchy  Patient has been prescribed oral antibiotics  Assessment and Plan:  Based on a thorough discussion of this condition and the management approach to it (including a comprehensive discussion of the known risks, side effects and potential benefits of treatment), the patient (family) agrees to implement the following specific plan:   Recommend using over the counter medications: Neutrogena Clear Pore and Differin 1% Gel     Directions for use: Apply in the shower; leave on for 5 minutes and rinse off  This will bleach your towels  This can be drying so remember to moisturize twice daily  Directions for use of Differin Gel: Spread one pea-sized amount of medication over entire face about one hour before bedtime                  Scribe Attestation    I,:  Valentine Jarvis am acting as a scribe while in the presence of the attending physician :       I,:  Tequila Espinosa MD personally performed the services described in this documentation    as scribed in my presence :

## 2022-04-28 NOTE — PATIENT INSTRUCTIONS
MELANOCYTIC NEVI ("Moles")    Assessment and Plan:  Based on a thorough discussion of this condition and the management approach to it (including a comprehensive discussion of the known risks, side effects and potential benefits of treatment), the patient (family) agrees to implement the following specific plan:   When outside we recommend using a wide brim hat, sunglasses, long sleeve and pants, sunscreen with SPF 31+ with reapplication every 2 hours, or SPF specific clothing    Benign, reassured   Annual skin check     Melanocytic Nevi  Melanocytic nevi ("moles") are tan or brown, raised or flat areas of the skin which have an increased number of melanocytes  Melanocytes are the cells in our body which make pigment and account for skin color  Some moles are present at birth (I e , "congenital nevi"), while others come up later in life (i e , "acquired nevi")  The sun can stimulate the body to make more moles  Sunburns are not the only thing that triggers more moles  Chronic sun exposure can do it too  Clinically distinguishing a healthy mole from melanoma may be difficult, even for experienced dermatologists  The "ABCDE's" of moles have been suggested as a means of helping to alert a person to a suspicious mole and the possible increased risk of melanoma  The suggestions for raising alert are as follows:    Asymmetry: Healthy moles tend to be symmetric, while melanomas are often asymmetric  Asymmetry means if you draw a line through the mole, the two halves do not match in color, size, shape, or surface texture  Asymmetry can be a result of rapid enlargement of a mole, the development of a raised area on a previously flat lesion, scaling, ulceration, bleeding or scabbing within the mole  Any mole that starts to demonstrate "asymmetry" should be examined promptly by a board certified dermatologist      Border: Healthy moles tend to have discrete, even borders    The border of a melanoma often blends into the normal skin and does not sharply delineate the mole from normal skin  Any mole that starts to demonstrate "uneven borders" should be examined promptly by a board certified dermatologist      Color: Healthy moles tend to be one color throughout  Melanomas tend to be made up of different colors ranging from dark black, blue, white, or red  Any mole that demonstrates a color change should be examined promptly by a board certified dermatologist      Diameter: Healthy moles tend to be smaller than 0 6 cm in size; an exception are "congenital nevi" that can be larger  Melanomas tend to grow and can often be greater than 0 6 cm (1/4 of an inch, or the size of a pencil eraser)  This is only a guideline, and many normal moles may be larger than 0 6 cm without being unhealthy  Any mole that starts to change in size (small to bigger or bigger to smaller) should be examined promptly by a board certified dermatologist      Evolving: Healthy moles tend to "stay the same "  Melanomas may often show signs of change or evolution such as a change in size, shape, color, or elevation  Any mole that starts to itch, bleed, crust, burn, hurt, or ulcerate or demonstrate a change or evolution should be examined promptly by a board certified dermatologist         RUCKER ANGIOMAS    Assessment and Plan:  Based on a thorough discussion of this condition and the management approach to it (including a comprehensive discussion of the known risks, side effects and potential benefits of treatment), the patient (family) agrees to implement the following specific plan:   Monitor for changes   Benign, reassured    Assessment and Plan:    Cherry angioma, also known as [employer] spots, are benign vascular skin lesions  A "cherry angioma" is a firm red, blue or purple papule, 0 1-1 cm in diameter   When thrombosed, they can appear black in colour until evaluated with a dermatoscope when the red or purple colour is more easily ruba Faith angioma may develop on any part of the body but most often appear on the scalp, face, lips and trunk  An angioma is due to proliferating endothelial cells; these are the cells that line the inside of a blood vessel  Angiomas can arise in early life or later in life; the most common type of angioma is a cherry angioma  Cherry angiomas are very common in males and females of any age or race  They are more noticeable in white skin than in skin of colour  They markedly increase in number from about the age of 36  There may be a family history of similar lesions  Eruptive cherry angiomas have been rarely reported to be associated with internal malignancy  The cause of angiomas is unknown  Genetic analysis of cherry angiomas has shown that they frequently carry specific somatic missense mutations in the GNAQ and GNA11 (Q209H) genes, which are involved in other vascular and melanocytic proliferations  XEROSIS ("DRY SKIN")    Assessment and Plan:  Based on a thorough discussion of this condition and the management approach to it (including a comprehensive discussion of the known risks, side effects and potential benefits of treatment), the patient (family) agrees to implement the following specific plan:   Use moisturizer like Eucerin,Cerave or Aveeno Cream 3 times a day for the dry skin               Dry skin refers to skin that feels dry to touch  Dry skin has a dull surface with a rough, scaly quality  The skin is less pliable and cracked  When dryness is severe, the skin may become inflamed and fissured  Although any body site can be dry, dry skin tends to affect the shins more than any other site  Dry skin is lacking moisture in the outer horny cell layer (stratum corneum) and this results in cracks in the skin surface  Dry skin is also called xerosis, xeroderma or asteatosis (lack of fat)  It can affect males and females of all ages   There is some racial variability in water and lipid content of the skin   Dry skin that starts in early childhood may be one of about 20 types of ichthyosis (fish-scale skin)  There is often a family history of dry skin   Dry skin is commonly seen in people with atopic dermatitis   Nearly everyone > 60 years has dry skin  Dry skin that begins later may be seen in people with certain diseases and conditions   Postmenopausal women   Hypothyroidism   Chronic renal disease    Malnutrition and weight loss    Subclinical dermatitis    Treatment with certain drugs such as oral retinoids, diuretics and epidermal growth factor receptor inhibitors      What is the treatment for dry skin? The mainstay of treatment of dry skin and ichthyosis is moisturisers/emollients  They should be applied liberally and often enough to:   Reduce itch    Improve the barrier function    Prevent entry of irritants, bacteria    Reduce transepidermal water loss  How can dry skin be prevented? Eliminate aggravating factors:   Reduce the frequency of bathing   A humidifier in winter and air conditioner in summer    Compare having a short shower with a prolonged soak in a bath   Use lukewarm, not hot, water   Replace standard soap with a substitute such as a synthetic detergent cleanser, water-miscible emollient, bath oil, anti-pruritic tar oil, colloidal oatmeal etc     Apply an emollient liberally and often, particularly shortly after bathing, and when itchy  The drier the skin, the thicker this should be, especially on the hands  What is the outlook for dry skin? A tendency to dry skin may persist life-long, or it may improve once contributing factors are controlled      DERMATOFIBROMA    Assessment and Plan:  Based on a thorough discussion of this condition and the management approach to it (including a comprehensive discussion of the known risks, side effects and potential benefits of treatment), the patient (family) agrees to implement the following specific plan:   Benign, assurance provided    Assessment and Plan:  A dermatofibroma is a common benign fibrous nodule that most often arises on the skin of the lower legs  A dermatofibroma is also called a "cutaneous fibrous histiocytoma "  Dermatofibromas occur at all ages and in people of every ethnicity  They are more common in women than in men  It is not clear if dermatofibroma is a reactive process or if it is a neoplasm  The lesions are made up of proliferating fibroblasts  Histiocytes may also be involved  They are sometimes attributed to an insect bite or ingrownhair or local trauma, but not consistently  They may be more numerous in patients with altered immunity  Dermatofibromas most often occur on the legs and arms, but may also arise on the trunk or any site of the body  Typical clinical features include the following:   People may have 1 or up to 15 lesions   Size varies from 0 5-1 5 cm diameter; most lesions are 7-10 mm diameter   They are firm nodules tethered to the skin surface and mobile over subcutaneous tissue   The skin "dimples" on pinching the lesion   Color may be pink to light brown in white skin, and dark brown to black in dark skin; some appear paler in the center   They do not usually cause symptoms, but they are sometimes painful or itchy   Because they are often raised lesions, they may be traumatized, for example by a razor   Occasionally dozens may erupt within a few months, usually in the setting of immunosuppression (for example autoimmune disease, cancer or certain medications)   Dermatofibroma does not give rise to cancer  However, occasionally, it may be mistaken for dermatofibrosarcoma or desmoplastic melanoma  A dermatofibroma is harmless and seldom causes any symptoms  Usually, only reassurance is needed   If it is nuisance or causing concern, the lesion can be removed surgically, resulting in a scar that is, by definition, usually longer in diameter than the widest portion of the dermatofibroma  Cryotherapy, shave biopsy and laser surgery are rarely completely successful  Skin punch biopsy or incisional biopsy may be undertaken if there is an atypical feature such as recent enlargement, ulceration, or asymmetrical structures and colours on dermatoscopy  RASH-POSSIBLE ACNE    Assessment and Plan:  Based on a thorough discussion of this condition and the management approach to it (including a comprehensive discussion of the known risks, side effects and potential benefits of treatment), the patient (family) agrees to implement the following specific plan:   Recommend using over the counter medications: Neutrogena Clear Pore and Differin 1% Gel     Directions for use: Apply in the shower; leave on for 5 minutes and rinse off  This will bleach your towels  This can be drying so remember to moisturize twice daily  Directions for use of Differin Gel: Spread one pea-sized amount of medication over entire face about one hour before bedtime

## 2022-05-01 ENCOUNTER — NURSE TRIAGE (OUTPATIENT)
Dept: OTHER | Facility: OTHER | Age: 57
End: 2022-05-01

## 2022-05-01 ENCOUNTER — ANESTHESIA EVENT (OUTPATIENT)
Dept: PERIOP | Facility: HOSPITAL | Age: 57
DRG: 658 | End: 2022-05-01
Payer: COMMERCIAL

## 2022-05-01 PROBLEM — R05.9 COUGH: Status: ACTIVE | Noted: 2021-10-13

## 2022-05-01 PROBLEM — M81.0 OSTEOPOROSIS: Status: ACTIVE | Noted: 2021-03-16

## 2022-05-01 PROBLEM — E78.2 MIXED HYPERLIPIDEMIA: Status: ACTIVE | Noted: 2017-07-28

## 2022-05-01 PROBLEM — C49.9: Status: ACTIVE | Noted: 2021-11-17

## 2022-05-01 PROBLEM — H40.9 GLAUCOMA: Status: ACTIVE | Noted: 2018-01-26

## 2022-05-01 NOTE — TELEPHONE ENCOUNTER
Regarding: Pre op / multivitamins question   ----- Message from Rakel Etienne sent at 4/30/2022  4:10 PM EDT -----  "I have surgery on Monday and I was instructed to stop my multivitamins 7 days before surgery but I forgot and took them the last two days"

## 2022-05-01 NOTE — TELEPHONE ENCOUNTER
TC to on-call, "Patient is scheduled for a partial nephrectomy d/t renal mass tomorrow at 0730  Patient calling because she forgot to hold Vitafusion Womens multivitamin Friday and Saturday morning  Patient asking if this is any conflict to surgery? She did not take anything today "    On-call provider will verify information with surgeon  Per provider, okay to proceed with surgery  Patient will continue to prep instructions starting at 3pm today  Reviewed with patient  No further questions  Reason for Disposition   [1] Caller has URGENT medicine question about med that PCP or specialist prescribed AND [2] triager unable to answer question    Answer Assessment - Initial Assessment Questions  1  NAME of MEDICATION: "What medicine are you calling about?"      Daily multivitamin- Women's Vitafusion  2  QUESTION: "What is your question?" (e g , medication refill, side effect)      I accidentally forgot to stop my multivitamin for a whole 7 days and I took it Friday and Saturday morning  3  PRESCRIBING HCP: "Who prescribed it?" Reason: if prescribed by specialist, call should be referred to that group        Urology    Protocols used: MEDICATION QUESTION CALL-ADULT-

## 2022-05-01 NOTE — TELEPHONE ENCOUNTER
Regarding: Surgery-Multivitams  ----- Message from Claudetta Grey sent at 5/1/2022  6:41 AM EDT -----  "I have surgery on Monday and I was instructed to stop my multivitamins 7 days before surgery but I forgot and took them the last two days"

## 2022-05-01 NOTE — ANESTHESIA PREPROCEDURE EVALUATION
Procedure:  ROBOTIC LAPAROSCOPIC PARTIAL NEPHRECTOMY (Right Abdomen)    Relevant Problems   CARDIO   (+) Mixed hyperlipidemia   (+) Telangiectasias      GI/HEPATIC   (+) Gastroesophageal reflux disease with esophagitis      Other   (+) Atypical lipomatous tumor (HCC)   (+) Chronic cough   (+) Cough   (+) Glaucoma   (+) Hyperglycemia   (+) Liposarcoma (HCC)   (+) Osteoporosis   (+) Renal mass, right   (+) Subarachnoid hemorrhage (HCC)   (+) Vasomotor rhinitis   (+) Vulvar intraepithelial neoplasia (PHILIP) grade 3      Lab Results   Component Value Date    SODIUM 141 04/15/2022    K 3 8 04/15/2022     04/15/2022    CO2 27 04/15/2022    AGAP 9 04/15/2022    BUN 24 04/15/2022    CREATININE 0 71 04/15/2022    GLUC 86 04/15/2022    GLUF 98 02/09/2021    CALCIUM 8 9 04/15/2022    AST 20 04/15/2022    ALT 26 04/15/2022    ALKPHOS 82 04/15/2022    TP 7 3 04/15/2022    TBILI 0 66 04/15/2022    EGFR 94 04/15/2022     Lab Results   Component Value Date    WBC 7 22 04/15/2022    HGB 14 0 04/15/2022    HCT 42 9 04/15/2022    MCV 93 04/15/2022     04/15/2022         Physical Exam    Airway    Mallampati score: II  TM Distance: >3 FB  Neck ROM: full     Dental   lower dentures and upper dentures,     Cardiovascular      Pulmonary      Other Findings        Anesthesia Plan  ASA Score- 2     Anesthesia Type- general with ASA Monitors  Additional Monitors:   Airway Plan: ETT  Plan Factors-Exercise tolerance (METS): >4 METS  Chart reviewed  EKG reviewed  Imaging results reviewed  Existing labs reviewed  Patient summary reviewed  Induction- intravenous  Postoperative Plan- Plan for postoperative opioid use  Planned trial extubation    Informed Consent- Anesthetic plan and risks discussed with patient and daughter

## 2022-05-02 ENCOUNTER — ANESTHESIA (OUTPATIENT)
Dept: PERIOP | Facility: HOSPITAL | Age: 57
DRG: 658 | End: 2022-05-02
Payer: COMMERCIAL

## 2022-05-02 ENCOUNTER — HOSPITAL ENCOUNTER (INPATIENT)
Facility: HOSPITAL | Age: 57
LOS: 2 days | Discharge: HOME | DRG: 658 | End: 2022-05-04
Attending: UROLOGY | Admitting: UROLOGY
Payer: COMMERCIAL

## 2022-05-02 DIAGNOSIS — N28.89 RENAL MASS, RIGHT: Primary | ICD-10-CM

## 2022-05-02 LAB
ABO GROUP BLD: NORMAL
ANION GAP SERPL CALCULATED.3IONS-SCNC: 8 MMOL/L (ref 4–13)
BUN SERPL-MCNC: 15 MG/DL (ref 5–25)
CALCIUM SERPL-MCNC: 8.1 MG/DL (ref 8.3–10.1)
CHLORIDE SERPL-SCNC: 106 MMOL/L (ref 100–108)
CO2 SERPL-SCNC: 27 MMOL/L (ref 21–32)
CREAT SERPL-MCNC: 0.94 MG/DL (ref 0.6–1.3)
ERYTHROCYTE [DISTWIDTH] IN BLOOD BY AUTOMATED COUNT: 13.2 % (ref 11.6–15.1)
GFR SERPL CREATININE-BSD FRML MDRD: 67 ML/MIN/1.73SQ M
GLUCOSE SERPL-MCNC: 141 MG/DL (ref 65–140)
HCT VFR BLD AUTO: 46 % (ref 34.8–46.1)
HGB BLD-MCNC: 14.6 G/DL (ref 11.5–15.4)
MCH RBC QN AUTO: 29.9 PG (ref 26.8–34.3)
MCHC RBC AUTO-ENTMCNC: 31.7 G/DL (ref 31.4–37.4)
MCV RBC AUTO: 94 FL (ref 82–98)
PLATELET # BLD AUTO: 312 THOUSANDS/UL (ref 149–390)
PMV BLD AUTO: 9.5 FL (ref 8.9–12.7)
POTASSIUM SERPL-SCNC: 3.8 MMOL/L (ref 3.5–5.3)
RBC # BLD AUTO: 4.88 MILLION/UL (ref 3.81–5.12)
RH BLD: POSITIVE
SODIUM SERPL-SCNC: 141 MMOL/L (ref 136–145)
WBC # BLD AUTO: 11.97 THOUSAND/UL (ref 4.31–10.16)

## 2022-05-02 PROCEDURE — 94760 N-INVAS EAR/PLS OXIMETRY 1: CPT

## 2022-05-02 PROCEDURE — NC001 PR NO CHARGE: Performed by: UROLOGY

## 2022-05-02 PROCEDURE — 85027 COMPLETE CBC AUTOMATED: CPT | Performed by: UROLOGY

## 2022-05-02 PROCEDURE — 88342 IMHCHEM/IMCYTCHM 1ST ANTB: CPT | Performed by: PATHOLOGY

## 2022-05-02 PROCEDURE — NC001 PR NO CHARGE: Performed by: PHYSICIAN ASSISTANT

## 2022-05-02 PROCEDURE — 88307 TISSUE EXAM BY PATHOLOGIST: CPT | Performed by: PATHOLOGY

## 2022-05-02 PROCEDURE — 88341 IMHCHEM/IMCYTCHM EA ADD ANTB: CPT | Performed by: PATHOLOGY

## 2022-05-02 PROCEDURE — 80048 BASIC METABOLIC PNL TOTAL CA: CPT | Performed by: UROLOGY

## 2022-05-02 PROCEDURE — 8E0W4CZ ROBOTIC ASSISTED PROCEDURE OF TRUNK REGION, PERCUTANEOUS ENDOSCOPIC APPROACH: ICD-10-PCS | Performed by: UROLOGY

## 2022-05-02 PROCEDURE — 50543 LAPARO PARTIAL NEPHRECTOMY: CPT | Performed by: UROLOGY

## 2022-05-02 PROCEDURE — S2900 ROBOTIC SURGICAL SYSTEM: HCPCS | Performed by: UROLOGY

## 2022-05-02 PROCEDURE — 88377 M/PHMTRC ALYS ISHQUANT/SEMIQ: CPT | Performed by: PATHOLOGY

## 2022-05-02 PROCEDURE — 0TB04ZZ EXCISION OF RIGHT KIDNEY, PERCUTANEOUS ENDOSCOPIC APPROACH: ICD-10-PCS | Performed by: UROLOGY

## 2022-05-02 PROCEDURE — 76998 US GUIDE INTRAOP: CPT | Performed by: UROLOGY

## 2022-05-02 RX ORDER — DOCUSATE SODIUM 100 MG/1
100 CAPSULE, LIQUID FILLED ORAL 2 TIMES DAILY
Qty: 30 CAPSULE | Refills: 0 | Status: SHIPPED | OUTPATIENT
Start: 2022-05-02 | End: 2022-05-17

## 2022-05-02 RX ORDER — LIDOCAINE HYDROCHLORIDE 10 MG/ML
INJECTION, SOLUTION EPIDURAL; INFILTRATION; INTRACAUDAL; PERINEURAL AS NEEDED
Status: DISCONTINUED | OUTPATIENT
Start: 2022-05-02 | End: 2022-05-02

## 2022-05-02 RX ORDER — GLYCOPYRROLATE 0.2 MG/ML
INJECTION INTRAMUSCULAR; INTRAVENOUS AS NEEDED
Status: DISCONTINUED | OUTPATIENT
Start: 2022-05-02 | End: 2022-05-02

## 2022-05-02 RX ORDER — MIDAZOLAM HYDROCHLORIDE 2 MG/2ML
INJECTION, SOLUTION INTRAMUSCULAR; INTRAVENOUS AS NEEDED
Status: DISCONTINUED | OUTPATIENT
Start: 2022-05-02 | End: 2022-05-02

## 2022-05-02 RX ORDER — KETOROLAC TROMETHAMINE 30 MG/ML
30 INJECTION, SOLUTION INTRAMUSCULAR; INTRAVENOUS EVERY 6 HOURS SCHEDULED
Status: DISPENSED | OUTPATIENT
Start: 2022-05-02 | End: 2022-05-04

## 2022-05-02 RX ORDER — MAGNESIUM HYDROXIDE 1200 MG/15ML
LIQUID ORAL AS NEEDED
Status: DISCONTINUED | OUTPATIENT
Start: 2022-05-02 | End: 2022-05-02 | Stop reason: HOSPADM

## 2022-05-02 RX ORDER — OXYCODONE HYDROCHLORIDE 5 MG/1
5 TABLET ORAL EVERY 4 HOURS PRN
Status: DISCONTINUED | OUTPATIENT
Start: 2022-05-02 | End: 2022-05-04 | Stop reason: HOSPADM

## 2022-05-02 RX ORDER — ROCURONIUM BROMIDE 10 MG/ML
INJECTION, SOLUTION INTRAVENOUS AS NEEDED
Status: DISCONTINUED | OUTPATIENT
Start: 2022-05-02 | End: 2022-05-02

## 2022-05-02 RX ORDER — FLUTICASONE PROPIONATE 50 MCG
2 SPRAY, SUSPENSION (ML) NASAL 2 TIMES DAILY PRN
Status: DISCONTINUED | OUTPATIENT
Start: 2022-05-02 | End: 2022-05-04 | Stop reason: HOSPADM

## 2022-05-02 RX ORDER — SUCCINYLCHOLINE/SOD CL,ISO/PF 100 MG/5ML
SYRINGE (ML) INTRAVENOUS AS NEEDED
Status: DISCONTINUED | OUTPATIENT
Start: 2022-05-02 | End: 2022-05-02

## 2022-05-02 RX ORDER — HEPARIN SODIUM 5000 [USP'U]/ML
5000 INJECTION, SOLUTION INTRAVENOUS; SUBCUTANEOUS EVERY 8 HOURS SCHEDULED
Status: DISCONTINUED | OUTPATIENT
Start: 2022-05-02 | End: 2022-05-04 | Stop reason: HOSPADM

## 2022-05-02 RX ORDER — HYDROMORPHONE HCL/PF 1 MG/ML
0.5 SYRINGE (ML) INJECTION EVERY 2 HOUR PRN
Status: ACTIVE | OUTPATIENT
Start: 2022-05-02 | End: 2022-05-04

## 2022-05-02 RX ORDER — OXYCODONE HYDROCHLORIDE 10 MG/1
10 TABLET ORAL EVERY 4 HOURS PRN
Status: DISCONTINUED | OUTPATIENT
Start: 2022-05-02 | End: 2022-05-04 | Stop reason: HOSPADM

## 2022-05-02 RX ORDER — OXYCODONE HYDROCHLORIDE 5 MG/1
5 TABLET ORAL EVERY 4 HOURS PRN
Qty: 5 TABLET | Refills: 0 | Status: SHIPPED | OUTPATIENT
Start: 2022-05-02 | End: 2022-05-07

## 2022-05-02 RX ORDER — ONDANSETRON 2 MG/ML
INJECTION INTRAMUSCULAR; INTRAVENOUS AS NEEDED
Status: DISCONTINUED | OUTPATIENT
Start: 2022-05-02 | End: 2022-05-02

## 2022-05-02 RX ORDER — ONDANSETRON 2 MG/ML
4 INJECTION INTRAMUSCULAR; INTRAVENOUS ONCE AS NEEDED
Status: DISCONTINUED | OUTPATIENT
Start: 2022-05-02 | End: 2022-05-02 | Stop reason: HOSPADM

## 2022-05-02 RX ORDER — MONTELUKAST SODIUM 10 MG/1
10 TABLET ORAL
Status: DISCONTINUED | OUTPATIENT
Start: 2022-05-02 | End: 2022-05-04 | Stop reason: HOSPADM

## 2022-05-02 RX ORDER — GABAPENTIN 300 MG/1
300 CAPSULE ORAL
Status: DISCONTINUED | OUTPATIENT
Start: 2022-05-02 | End: 2022-05-04 | Stop reason: HOSPADM

## 2022-05-02 RX ORDER — NEOSTIGMINE METHYLSULFATE 1 MG/ML
INJECTION INTRAVENOUS AS NEEDED
Status: DISCONTINUED | OUTPATIENT
Start: 2022-05-02 | End: 2022-05-02

## 2022-05-02 RX ORDER — DOCUSATE SODIUM 100 MG/1
100 CAPSULE, LIQUID FILLED ORAL 2 TIMES DAILY
Status: DISCONTINUED | OUTPATIENT
Start: 2022-05-02 | End: 2022-05-04 | Stop reason: HOSPADM

## 2022-05-02 RX ORDER — FENTANYL CITRATE 50 UG/ML
INJECTION, SOLUTION INTRAMUSCULAR; INTRAVENOUS AS NEEDED
Status: DISCONTINUED | OUTPATIENT
Start: 2022-05-02 | End: 2022-05-02

## 2022-05-02 RX ORDER — LIDOCAINE HYDROCHLORIDE AND EPINEPHRINE 20; 5 MG/ML; UG/ML
INJECTION, SOLUTION EPIDURAL; INFILTRATION; INTRACAUDAL; PERINEURAL AS NEEDED
Status: DISCONTINUED | OUTPATIENT
Start: 2022-05-02 | End: 2022-05-02 | Stop reason: HOSPADM

## 2022-05-02 RX ORDER — LATANOPROST 50 UG/ML
1 SOLUTION/ DROPS OPHTHALMIC
Status: DISCONTINUED | OUTPATIENT
Start: 2022-05-02 | End: 2022-05-04 | Stop reason: HOSPADM

## 2022-05-02 RX ORDER — SODIUM CHLORIDE, SODIUM LACTATE, POTASSIUM CHLORIDE, CALCIUM CHLORIDE 600; 310; 30; 20 MG/100ML; MG/100ML; MG/100ML; MG/100ML
INJECTION, SOLUTION INTRAVENOUS CONTINUOUS PRN
Status: DISCONTINUED | OUTPATIENT
Start: 2022-05-02 | End: 2022-05-02

## 2022-05-02 RX ORDER — CEFAZOLIN SODIUM 2 G/50ML
2000 SOLUTION INTRAVENOUS ONCE
Status: COMPLETED | OUTPATIENT
Start: 2022-05-02 | End: 2022-05-02

## 2022-05-02 RX ORDER — ACETAMINOPHEN 325 MG/1
650 TABLET ORAL EVERY 6 HOURS SCHEDULED
Status: DISCONTINUED | OUTPATIENT
Start: 2022-05-02 | End: 2022-05-04 | Stop reason: HOSPADM

## 2022-05-02 RX ORDER — LORATADINE 10 MG/1
10 TABLET ORAL DAILY
Status: DISCONTINUED | OUTPATIENT
Start: 2022-05-02 | End: 2022-05-04 | Stop reason: HOSPADM

## 2022-05-02 RX ORDER — PROPOFOL 10 MG/ML
INJECTION, EMULSION INTRAVENOUS AS NEEDED
Status: DISCONTINUED | OUTPATIENT
Start: 2022-05-02 | End: 2022-05-02

## 2022-05-02 RX ORDER — DEXAMETHASONE SODIUM PHOSPHATE 10 MG/ML
INJECTION, SOLUTION INTRAMUSCULAR; INTRAVENOUS AS NEEDED
Status: DISCONTINUED | OUTPATIENT
Start: 2022-05-02 | End: 2022-05-02

## 2022-05-02 RX ORDER — HYDROMORPHONE HCL/PF 1 MG/ML
SYRINGE (ML) INJECTION AS NEEDED
Status: DISCONTINUED | OUTPATIENT
Start: 2022-05-02 | End: 2022-05-02

## 2022-05-02 RX ORDER — FENTANYL CITRATE/PF 50 MCG/ML
25 SYRINGE (ML) INJECTION
Status: DISCONTINUED | OUTPATIENT
Start: 2022-05-02 | End: 2022-05-02 | Stop reason: HOSPADM

## 2022-05-02 RX ORDER — ONDANSETRON 2 MG/ML
4 INJECTION INTRAMUSCULAR; INTRAVENOUS EVERY 6 HOURS PRN
Status: DISCONTINUED | OUTPATIENT
Start: 2022-05-02 | End: 2022-05-04 | Stop reason: HOSPADM

## 2022-05-02 RX ORDER — SODIUM CHLORIDE, SODIUM LACTATE, POTASSIUM CHLORIDE, CALCIUM CHLORIDE 600; 310; 30; 20 MG/100ML; MG/100ML; MG/100ML; MG/100ML
125 INJECTION, SOLUTION INTRAVENOUS CONTINUOUS
Status: DISPENSED | OUTPATIENT
Start: 2022-05-02 | End: 2022-05-03

## 2022-05-02 RX ORDER — ACETAMINOPHEN 325 MG/1
650 TABLET ORAL EVERY 4 HOURS PRN
Qty: 30 TABLET | Refills: 0
Start: 2022-05-02 | End: 2022-06-27 | Stop reason: CLARIF

## 2022-05-02 RX ORDER — BUPIVACAINE HYDROCHLORIDE 2.5 MG/ML
INJECTION, SOLUTION EPIDURAL; INFILTRATION; INTRACAUDAL AS NEEDED
Status: DISCONTINUED | OUTPATIENT
Start: 2022-05-02 | End: 2022-05-02 | Stop reason: HOSPADM

## 2022-05-02 RX ORDER — HYDROMORPHONE HCL IN WATER/PF 6 MG/30 ML
0.2 PATIENT CONTROLLED ANALGESIA SYRINGE INTRAVENOUS
Status: DISCONTINUED | OUTPATIENT
Start: 2022-05-02 | End: 2022-05-02 | Stop reason: HOSPADM

## 2022-05-02 RX ADMIN — SODIUM CHLORIDE, SODIUM LACTATE, POTASSIUM CHLORIDE, AND CALCIUM CHLORIDE 125 ML/HR: .6; .31; .03; .02 INJECTION, SOLUTION INTRAVENOUS at 13:56

## 2022-05-02 RX ADMIN — FENTANYL CITRATE 50 MCG: 50 INJECTION, SOLUTION INTRAMUSCULAR; INTRAVENOUS at 07:37

## 2022-05-02 RX ADMIN — LATANOPROST 1 DROP: 50 SOLUTION OPHTHALMIC at 21:51

## 2022-05-02 RX ADMIN — ROCURONIUM BROMIDE 30 MG: 10 INJECTION, SOLUTION INTRAVENOUS at 07:38

## 2022-05-02 RX ADMIN — MIDAZOLAM HYDROCHLORIDE 2 MG: 1 INJECTION, SOLUTION INTRAMUSCULAR; INTRAVENOUS at 07:26

## 2022-05-02 RX ADMIN — PROPOFOL 50 MG: 10 INJECTION, EMULSION INTRAVENOUS at 07:39

## 2022-05-02 RX ADMIN — CEFAZOLIN SODIUM 2000 MG: 2 SOLUTION INTRAVENOUS at 07:26

## 2022-05-02 RX ADMIN — Medication 100 MG: at 07:38

## 2022-05-02 RX ADMIN — HEPARIN SODIUM 5000 UNITS: 5000 INJECTION INTRAVENOUS; SUBCUTANEOUS at 14:01

## 2022-05-02 RX ADMIN — ACETAMINOPHEN 650 MG: 325 TABLET ORAL at 15:11

## 2022-05-02 RX ADMIN — ONDANSETRON 4 MG: 2 INJECTION INTRAMUSCULAR; INTRAVENOUS at 10:20

## 2022-05-02 RX ADMIN — ONDANSETRON 4 MG: 2 INJECTION INTRAMUSCULAR; INTRAVENOUS at 16:45

## 2022-05-02 RX ADMIN — GABAPENTIN 300 MG: 300 CAPSULE ORAL at 21:51

## 2022-05-02 RX ADMIN — KETOROLAC TROMETHAMINE 30 MG: 30 INJECTION, SOLUTION INTRAMUSCULAR at 13:40

## 2022-05-02 RX ADMIN — ROCURONIUM BROMIDE 20 MG: 10 INJECTION, SOLUTION INTRAVENOUS at 08:10

## 2022-05-02 RX ADMIN — LIDOCAINE HYDROCHLORIDE 50 MG: 10 INJECTION, SOLUTION EPIDURAL; INFILTRATION; INTRACAUDAL at 07:37

## 2022-05-02 RX ADMIN — LIDOCAINE HYDROCHLORIDE 50 MG: 10 INJECTION, SOLUTION EPIDURAL; INFILTRATION; INTRACAUDAL at 10:49

## 2022-05-02 RX ADMIN — PROPOFOL 100 MG: 10 INJECTION, EMULSION INTRAVENOUS at 07:37

## 2022-05-02 RX ADMIN — NEOSTIGMINE METHYLSULFATE 3 MG: 1 INJECTION INTRAVENOUS at 10:52

## 2022-05-02 RX ADMIN — MONTELUKAST 10 MG: 10 TABLET, FILM COATED ORAL at 21:51

## 2022-05-02 RX ADMIN — SODIUM CHLORIDE, SODIUM LACTATE, POTASSIUM CHLORIDE, AND CALCIUM CHLORIDE: .6; .31; .03; .02 INJECTION, SOLUTION INTRAVENOUS at 07:27

## 2022-05-02 RX ADMIN — DOCUSATE SODIUM 100 MG: 100 CAPSULE, LIQUID FILLED ORAL at 19:17

## 2022-05-02 RX ADMIN — HEPARIN SODIUM 5000 UNITS: 5000 INJECTION INTRAVENOUS; SUBCUTANEOUS at 21:51

## 2022-05-02 RX ADMIN — HYDROMORPHONE HYDROCHLORIDE 0.5 MG: 1 INJECTION, SOLUTION INTRAMUSCULAR; INTRAVENOUS; SUBCUTANEOUS at 10:48

## 2022-05-02 RX ADMIN — GLYCOPYRROLATE 0.6 MG: 0.2 INJECTION, SOLUTION INTRAMUSCULAR; INTRAVENOUS at 10:52

## 2022-05-02 RX ADMIN — LIDOCAINE HYDROCHLORIDE 50 MG: 10 INJECTION, SOLUTION EPIDURAL; INFILTRATION; INTRACAUDAL at 10:54

## 2022-05-02 RX ADMIN — SODIUM CHLORIDE, SODIUM LACTATE, POTASSIUM CHLORIDE, AND CALCIUM CHLORIDE: .6; .31; .03; .02 INJECTION, SOLUTION INTRAVENOUS at 09:19

## 2022-05-02 RX ADMIN — ROCURONIUM BROMIDE 30 MG: 10 INJECTION, SOLUTION INTRAVENOUS at 08:42

## 2022-05-02 RX ADMIN — FENTANYL CITRATE 50 MCG: 50 INJECTION, SOLUTION INTRAMUSCULAR; INTRAVENOUS at 10:47

## 2022-05-02 RX ADMIN — FENTANYL CITRATE 25 MCG: 50 INJECTION INTRAMUSCULAR; INTRAVENOUS at 12:14

## 2022-05-02 RX ADMIN — ROCURONIUM BROMIDE 20 MG: 10 INJECTION, SOLUTION INTRAVENOUS at 09:33

## 2022-05-02 RX ADMIN — FENTANYL CITRATE 50 MCG: 50 INJECTION, SOLUTION INTRAMUSCULAR; INTRAVENOUS at 08:08

## 2022-05-02 RX ADMIN — ACETAMINOPHEN 650 MG: 325 TABLET ORAL at 19:17

## 2022-05-02 RX ADMIN — FENTANYL CITRATE 25 MCG: 50 INJECTION INTRAMUSCULAR; INTRAVENOUS at 12:08

## 2022-05-02 RX ADMIN — HYDROMORPHONE HYDROCHLORIDE 0.5 MG: 1 INJECTION, SOLUTION INTRAMUSCULAR; INTRAVENOUS; SUBCUTANEOUS at 08:10

## 2022-05-02 RX ADMIN — FENTANYL CITRATE 25 MCG: 50 INJECTION INTRAMUSCULAR; INTRAVENOUS at 12:22

## 2022-05-02 RX ADMIN — DEXAMETHASONE SODIUM PHOSPHATE 10 MG: 10 INJECTION, SOLUTION INTRAMUSCULAR; INTRAVENOUS at 08:00

## 2022-05-02 RX ADMIN — PROPOFOL 50 MG: 10 INJECTION, EMULSION INTRAVENOUS at 07:48

## 2022-05-02 RX ADMIN — KETOROLAC TROMETHAMINE 30 MG: 30 INJECTION, SOLUTION INTRAMUSCULAR at 19:17

## 2022-05-02 RX ADMIN — FENTANYL CITRATE 50 MCG: 50 INJECTION, SOLUTION INTRAMUSCULAR; INTRAVENOUS at 08:59

## 2022-05-02 NOTE — DISCHARGE INSTRUCTIONS
Robotic Partial Nephrectomy   WHAT YOU NEED TO KNOW:   Robotic partial nephrectomy is surgery to remove part of your kidney  Surgery will be done through small incisions on your side  DISCHARGE INSTRUCTIONS:   Call your local emergency number (911 in the [address] [facility]) if:   · You have sudden chest pain or trouble breathing  Seek care immediately if:   · Blood soaks through your bandage  · Your stitches come apart  · You cannot urinate  Call your nephrologist or surgeon if:   · You have a fever  · You have blood in your urine  · Your wound is red, swollen, or draining pus  · You have chills, a cough, or feel weak and achy  · You have questions or concerns about your condition or care  Medicines: You may need any of the following:  · Prescription pain medicine  may be given  Ask your healthcare provider how to take this medicine safely  Some prescription pain medicines contain acetaminophen  Do not take other medicines that contain acetaminophen without talking to your healthcare provider  Too much acetaminophen may cause liver damage  Prescription pain medicine may cause constipation  Ask your healthcare provider how to prevent or treat constipation  · Antibiotics  may be given to prevent or treat an infection caused by bacteria  · Bowel movement softeners  may be given to help prevent constipation  · Take your medicine as directed  Contact your healthcare provider if you think your medicine is not helping or if you have side effects  Tell him or her if you are allergic to any medicine  Keep a list of the medicines, vitamins, and herbs you take  Include the amounts, and when and why you take them  Bring the list or the pill bottles to follow-up visits  Carry your medicine list with you in case of an emergency  Care for the surgery area:   · Do not get your stitches wet unless your surgeon says it is okay   When you are allowed to bathe, carefully wash the area with soap and water  Gently pat the area dry and put on new, clean bandages as directed  Change your bandages when they get wet or dirty  · You may have pieces of medical tape on your incision wound  Keep them clean and dry  They will fall off on their own after several days  Do not pull them off  Drink liquids as directed: This will help prevent constipation  Ask your healthcare provider how much liquid to drink each day and which liquids are best for you  Activity:  Do not lift, pull, or push heavy objects  You may also need to limit movement, such as bending your back or twisting  Ask when you can return to work and do other activities  Follow up with your nephrologist or surgeon as directed: You will need to return to have your wound checked and stitches removed  Write down your questions so you remember to ask them during your visits  © Copyright [employer] 2022 Information is for End User's use only and may not be sold, redistributed or otherwise used for commercial purposes  All illustrations and images included in CareNotes® are the copyrighted property of [employer] Inc  or [facility] a[employer]vi H[employer]er   The above information is an  only  It is not intended as medical advice for individual conditions or treatments  Talk to your doctor, nurse or pharmacist before following any medical regimen to see if it is safe and effective for you

## 2022-05-02 NOTE — OP NOTE
Operative Note     PATIENT:  Shahla Bah (MRN [id number])    DATE OF PROCEDURE:   5/2/2022    PRE-OP DIAGNOSES:   1) Right renal mass consistent with renal cell carcinoma (cT1a)      POST-OP DIAGNOSES AND OPERATIVE FINDINGS:   1) Right renal mass consistent with renal cell carcinoma (cT1a)    PROCEDURES:  1) right robotic-assisted partial nephrectomy  2) Intra-operative retroperitoneal ultrasound    SURGEON:  Magalys Brock MD    ASSISTANTS:  Tommy Cuenca MD    NOTE:  There were no qualified teaching residents to assist with this case    The assistance of a 2nd robotically trained urologist is required for this case particularly as the lesion is extraordinarily posterior incomplete mobilization of the kidney with experienced required to provide exposure during removal of the tumor and performed the renorrhaphy  ANESTHESIA TYPE:  General anesthesia    ESTIMATED BLOOD LOSS: 50 mL    COMPLICATIONS:   None    SPECIMENS:   Right renal mass    ANTIBIOTICS:  Cephazolin    INTRAOPERATIVE THROMBOEMBOLISM PROPHYLAXIS:  Pneumatic compression stockings     INDICATIONS FOR PROCEDURE:  Shahla Bah is an 61 y o  old female with a right renal mass measuring 1 0 cm  After discussing the options, the patient elected to undergo a robotic assisted laparoscopic partial nephrectomy  We discussed the procedure in detail, the alternatives, and the risks, and they signed informed consent to proceed  PROCEDURE SUMMARY:  The patient was brought to the operating room and anesthesia obtained  A 16 F Gill catheter was placed  The patient was then placed in the  lateral decubitus position with the right side elevated and prepped and draped using standard sterile technique  All pressure points were carefully padded  An axillary roll was applied  Antibiotics were administered, and thromboembolism prophylaxis was given    A surgical time out was performed with all in the room in agreement with the correct patient, procedure, indications, and laterality  I began by attempting standard Veress access in the right lower quadrant  However I was unhappy with the opening pressure observed  I therefore elected to perform access using the [facility] technique  A 1 cm transverse incision was made along the right midaxillary line beneath the level of the umbilicus  I was able to incise down and expose the peritoneum which was then incised sharply after elevating it with a series of stay sutures  I was able to confirm both visually and palpably entrance into the peritoneum  I which point I introduced a 8 mm robotic port, obtained insufflation and attached a pursestring suture to the peritoneum and fascia to allow insufflation throughout the case  The robotic camera was inserted and the viscera examined  No evidence of adjacent organ injury was noted  Due to the patient's extremely narrow and small and thin body habitus, I did modify my standard port position by placing a series of ports in oblique orientation angling between the xiphoid and the anterior superior iliac spine  After the initial Callum technique all subsequent ports were placed under laparoscopic vision  A 15 mm AirSeal port was placed in the midline supraumbilical location for the assistant port  I elected not to place a 5 mm port  The  colon was mobilized along the line of Toldt using a combination of sharp and blunt dissection  The inferior vena cava was identified and the duodenum Kocherized  The right kidney was then carefully dissected and the renal hilum identified  The ureter and adrenal gland were identified and dissected free of the kidney  The kidney was then defatted and the renal hilum skeletonized to allow for accurate clamping of the renal vasculature  The tumor was clearly identified on the kidney  Intraoperative ultrasound was used  The renal cortex surrounding the tumor was scored with electrocautery   The renal hilum was clamped with atraumatic vascular bulldog clamps  Using a combination of blunt and sharp dissection, then tumor was resected and placed temporarily behind the kidney  As the renorrhaphy was quite small, was closed in a single layer using a 3-0 V lock to reapproximate the capsule  Each layer was closed using the sliding Weck clip technique  The vascular clamping was then removed  Floseal was then applied  Total warm ischemic time was 15 min  The kidney became immediately firm and pink and shown no evidence of vascular compromise  The mass was then placed in a 10 mm Endocatch bag along with any perirenal fat that was adjacent to the tumor  Insufflation pressure was then dropped to 5 mmHg and the surgical site examined  No evidence of active bleeding was noted  The viscera showed no evidence of iatrogenic trauma  The robotic arms were then undocked and the pneumoperitoneum deflated  Ports were removed  The 12 mm assistant port was extended to approximately 2 cm and the rectus sheath incised along a similar length  The mass was then removed intact within the Endocatch bag and sent for pathology  The fascia was closed with running 0 Maxon suture  Subcutaneous fat with interrupted 2-0 Vicryl suture and the skin with subcuticular 4-0 Biosyn  All other port sites were closed at the skin only using subcuticular 4-0 Biosyn and dermal glue  Sterile dressings were then applied  An instrument count was obtained and was correct  DISPOSITION:   PACU - hemodynamically stable

## 2022-05-02 NOTE — H&P
UROLOGY HISTORY AND PHYSICAL     Patient Identifiers: Rigo Blandon (MRN [id number])      Date of Service: 5/2/2022        ASSESSMENT:     61 y o  old female with  right renal mass presents for robotic partial nephrectomy  PLAN:     To for right partial nephrectomy      History of Present Illness:     Rigo Blandon is a 61 y o  old with a history of right renal mass    Past Medical, Past Surgical History:     Past Medical History:   Diagnosis Date    Cancer ([facility] [address] )     4/15/22 Pt reports right kidney cancer     GERD (gastroesophageal reflux disease)     4/15/22 Pt reports on occasion - takes no medications -reports using TUMS as needed    History of HPV infection     Hyperlipidemia     4/15/22 Pt reports cholesterol is borderline - on no medications at this time    Renal lesion     Seasonal allergies     4/15/22 Pt reports has shortness of breath only with seasonal allergies "tightness with breathing reported with allergies "   :    Past Surgical History:   Procedure Laterality Date    CERVIX SURGERY      Cervical cyst removed / also for HPV    COLONOSCOPY      DILATION AND CURETTAGE OF UTERUS      EGD      SOFT TISSUE MASS EXCISION      mass removed from back   [address] [place] [address]      4/15/22 Pt reports had fibroid removed - indicated cervical area    :    Medications, Allergies:     Current Facility-Administered Medications:     ceFAZolin (ANCEF) IVPB (premix in dextrose) 2,000 mg 50 mL, 2,000 mg, Intravenous, Once, Eva Medina PA-C    Allergies:   Allergies   Allergen Reactions    Other Sneezing     Cats, dogs, seasonal - pt reports hard to breath and coughing with these allergies    :    Social and Family History:   Social History:   Social History     Tobacco Use    Smoking status: Never Smoker    Smokeless tobacco: Never Used    Tobacco comment: Denies any former or current use    Vaping Use    Vaping Use: Never used   Substance Use Topics    Alcohol use: Never     Comment: Denies any former or current use of alcohol     Drug use: Never     Comment: Denies any former or current drug use      Social History     Tobacco Use   Smoking Status Never Smoker   Smokeless Tobacco Never Used   Tobacco Comment    Denies any former or current use        Family History:  Family History   Problem Relation Age of Onset    No Known Problems Mother     Alzheimer's disease Father     No Known Problems Sister     No Known Problems Daughter    :     Review of Systems:     General: Fever, chills, or night sweats: negative  Cardiac: Negative for chest pain  Pulmonary: Negative for shortness of breath  Gastrointestinal: Abdominal pain negative  Nausea, vomiting, or diarrhea negative  Genitourinary: See HPI above  Patient does nothave hematuria  All other systems queried were negative  Physical Exam:   General: Patient is pleasant and in NAD  Awake and alert  /83   Pulse 75   Temp 98 4 °F (36 9 °C) (Temporal)   Resp 18   SpO2 98%   HEENT:  Normocephalic atraumatic  Cardiac:  Regular rate and rhythm, Peripheral edema: negative  Pulmonary: Non-labored breathing, CTAB  Abdomen: Soft, non-tender, non-distended  No surgical scars  No masses, tenderness, hernias noted  Genitourinary: negative CVA tenderness, neg suprapubic tenderness  Extremities: normal movement in all 4       Labs:     Lab Results   Component Value Date    HGB 14 0 04/15/2022    HCT 42 9 04/15/2022    WBC 7 22 04/15/2022     04/15/2022   ]    Lab Results   Component Value Date    K 3 8 04/15/2022     04/15/2022    CO2 27 04/15/2022    BUN 24 04/15/2022    CREATININE 0 71 04/15/2022    CALCIUM 8 9 04/15/2022   ]    Imaging:   I personally reviewed the images and report of the following studies, and reviewed them with the patient:        Thank you for allowing me to participate in this patients care  Please do not hesitate to call with any additional questions    Sirisha Gerber MD

## 2022-05-03 LAB
ANION GAP SERPL CALCULATED.3IONS-SCNC: 6 MMOL/L (ref 4–13)
BUN SERPL-MCNC: 12 MG/DL (ref 5–25)
CALCIUM SERPL-MCNC: 8 MG/DL (ref 8.3–10.1)
CHLORIDE SERPL-SCNC: 108 MMOL/L (ref 100–108)
CO2 SERPL-SCNC: 27 MMOL/L (ref 21–32)
CREAT SERPL-MCNC: 0.8 MG/DL (ref 0.6–1.3)
ERYTHROCYTE [DISTWIDTH] IN BLOOD BY AUTOMATED COUNT: 13.4 % (ref 11.6–15.1)
GFR SERPL CREATININE-BSD FRML MDRD: 81 ML/MIN/1.73SQ M
GLUCOSE SERPL-MCNC: 100 MG/DL (ref 65–140)
HCT VFR BLD AUTO: 37.5 % (ref 34.8–46.1)
HGB BLD-MCNC: 12.2 G/DL (ref 11.5–15.4)
MCH RBC QN AUTO: 29.9 PG (ref 26.8–34.3)
MCHC RBC AUTO-ENTMCNC: 32.5 G/DL (ref 31.4–37.4)
MCV RBC AUTO: 92 FL (ref 82–98)
PLATELET # BLD AUTO: 303 THOUSANDS/UL (ref 149–390)
PMV BLD AUTO: 9.7 FL (ref 8.9–12.7)
POTASSIUM SERPL-SCNC: 3.7 MMOL/L (ref 3.5–5.3)
RBC # BLD AUTO: 4.08 MILLION/UL (ref 3.81–5.12)
SODIUM SERPL-SCNC: 141 MMOL/L (ref 136–145)
WBC # BLD AUTO: 10.37 THOUSAND/UL (ref 4.31–10.16)

## 2022-05-03 PROCEDURE — 80048 BASIC METABOLIC PNL TOTAL CA: CPT | Performed by: UROLOGY

## 2022-05-03 PROCEDURE — NC001 PR NO CHARGE: Performed by: PHYSICIAN ASSISTANT

## 2022-05-03 PROCEDURE — 85027 COMPLETE CBC AUTOMATED: CPT | Performed by: UROLOGY

## 2022-05-03 PROCEDURE — 99024 POSTOP FOLLOW-UP VISIT: CPT | Performed by: PHYSICIAN ASSISTANT

## 2022-05-03 PROCEDURE — NC001 PR NO CHARGE: Performed by: UROLOGY

## 2022-05-03 RX ADMIN — KETOROLAC TROMETHAMINE 30 MG: 30 INJECTION, SOLUTION INTRAMUSCULAR at 18:15

## 2022-05-03 RX ADMIN — OXYCODONE HYDROCHLORIDE 5 MG: 5 TABLET ORAL at 08:34

## 2022-05-03 RX ADMIN — ACETAMINOPHEN 650 MG: 325 TABLET ORAL at 00:10

## 2022-05-03 RX ADMIN — ACETAMINOPHEN 650 MG: 325 TABLET ORAL at 23:08

## 2022-05-03 RX ADMIN — KETOROLAC TROMETHAMINE 30 MG: 30 INJECTION, SOLUTION INTRAMUSCULAR at 23:08

## 2022-05-03 RX ADMIN — FLUTICASONE PROPIONATE 2 SPRAY: 50 SPRAY, METERED NASAL at 23:08

## 2022-05-03 RX ADMIN — LATANOPROST 1 DROP: 50 SOLUTION OPHTHALMIC at 21:57

## 2022-05-03 RX ADMIN — MONTELUKAST 10 MG: 10 TABLET, FILM COATED ORAL at 21:57

## 2022-05-03 RX ADMIN — DOCUSATE SODIUM 100 MG: 100 CAPSULE, LIQUID FILLED ORAL at 18:15

## 2022-05-03 RX ADMIN — ACETAMINOPHEN 650 MG: 325 TABLET ORAL at 18:15

## 2022-05-03 RX ADMIN — HEPARIN SODIUM 5000 UNITS: 5000 INJECTION INTRAVENOUS; SUBCUTANEOUS at 05:23

## 2022-05-03 RX ADMIN — KETOROLAC TROMETHAMINE 30 MG: 30 INJECTION, SOLUTION INTRAMUSCULAR at 05:23

## 2022-05-03 RX ADMIN — HEPARIN SODIUM 5000 UNITS: 5000 INJECTION INTRAVENOUS; SUBCUTANEOUS at 14:20

## 2022-05-03 RX ADMIN — HEPARIN SODIUM 5000 UNITS: 5000 INJECTION INTRAVENOUS; SUBCUTANEOUS at 21:57

## 2022-05-03 RX ADMIN — GABAPENTIN 300 MG: 300 CAPSULE ORAL at 21:57

## 2022-05-03 RX ADMIN — B-COMPLEX W/ C & FOLIC ACID TAB 1 TABLET: TAB at 08:33

## 2022-05-03 RX ADMIN — DOCUSATE SODIUM 100 MG: 100 CAPSULE, LIQUID FILLED ORAL at 08:33

## 2022-05-03 RX ADMIN — KETOROLAC TROMETHAMINE 30 MG: 30 INJECTION, SOLUTION INTRAMUSCULAR at 00:10

## 2022-05-03 RX ADMIN — ACETAMINOPHEN 650 MG: 325 TABLET ORAL at 05:23

## 2022-05-03 NOTE — DISCHARGE SUMMARY
Discharge Summary - Jovita Claude 61 y o  female MRN: [id number]    Unit/Bed#: S -33 Encounter: [id number]    Admission Date:   Admission Orders (From admission, onward)     Ordered        05/02/22 1058  Inpatient Admission  Once                        Admitting Diagnosis: Renal mass, right [N28 89]    HPI: Jovita Claude is a 61year old female with image findings of right renal mass consistent with RCC  Patient is now status post right robotic assisted partial nephrectomy  Procedure went well and patient recovering well  Procedures Performed: No orders of the defined types were placed in this encounter  Summary of Hospital Course: As above    Significant Findings, Care, Treatment and Services Provided: none    Complications: None    Discharge Diagnosis: Same    Medical Problems             Resolved Problems  Date Reviewed: 5/2/2022    None                Condition at Discharge: good         Discharge instructions/Information to patient and family:   See after visit summary for information provided to patient and family  Provisions for Follow-Up Care:  See after visit summary for information related to follow-up care and any pertinent home health orders  PCP: Jerilyn Garcia MD    Disposition: Home    Planned Readmission: No      Discharge Statement   I spent 20 minutes discharging the patient  This time was spent on the day of discharge  I had direct contact with the patient on the day of discharge  Additional documentation is required if more than 30 minutes were spent on discharge  Discharge Medications:  See after visit summary for reconciled discharge medications provided to patient and family

## 2022-05-03 NOTE — PROGRESS NOTES
Patient was to be discharged today, however, due to weakness, will stay one more night   Fluids overnight, repeat labs ordered for AM

## 2022-05-03 NOTE — PLAN OF CARE
Problem: MOBILITY - ADULT  Goal: Maintain or return to baseline ADL function  Description: INTERVENTIONS:  -  Assess patient's ability to carry out ADLs; assess patient's baseline for ADL function and identify physical deficits which impact ability to perform ADLs (bathing, care of mouth/teeth, toileting, grooming, dressing, etc )  - Assess/evaluate cause of self-care deficits   - Assess range of motion  - Assess patient's mobility; develop plan if impaired  - Assess patient's need for assistive devices and provide as appropriate  - Encourage maximum independence but intervene and supervise when necessary  - Involve family in performance of ADLs  - Assess for home care needs following discharge   - Consider OT consult to assist with ADL evaluation and planning for discharge  - Provide patient education as appropriate  Outcome: Progressing  Goal: Maintains/Returns to pre admission functional level  Description: INTERVENTIONS:  - Perform BMAT or MOVE assessment daily    - Set and communicate daily mobility goal to care team and patient/family/caregiver     - Collaborate with rehabilitation services on mobility goals if consulted    - Out of bed for toileting  - Record patient progress and toleration of activity level   Outcome: Progressing     Problem: GASTROINTESTINAL - ADULT  Goal: Minimal or absence of nausea and/or vomiting  Description: INTERVENTIONS:  - Administer IV fluids if ordered to ensure adequate hydration  - Maintain NPO status until nausea and vomiting are resolved  - Nasogastric tube if ordered  - Administer ordered antiemetic medications as needed  - Provide nonpharmacologic comfort measures as appropriate  - Advance diet as tolerated, if ordered  - Consider nutrition services referral to assist patient with adequate nutrition and appropriate food choices  Outcome: Progressing  Goal: Maintains or returns to baseline bowel function  Description: INTERVENTIONS:  - Assess bowel function  - Encourage oral fluids to ensure adequate hydration  - Administer IV fluids if ordered to ensure adequate hydration  - Administer ordered medications as needed  - Encourage mobilization and activity  - Consider nutritional services referral to assist patient with adequate nutrition and appropriate food choices  Outcome: Progressing     Problem: GENITOURINARY - ADULT  Goal: Urinary catheter remains patent  Description: INTERVENTIONS:  - Assess patency of urinary catheter  - If patient has a chronic ramirez, consider changing catheter if non-functioning  - Follow guidelines for intermittent irrigation of non-functioning urinary catheter  Outcome: Progressing     Problem: METABOLIC, FLUID AND ELECTROLYTES - ADULT  Goal: Electrolytes maintained within normal limits  Description: INTERVENTIONS:  - Monitor labs and assess patient for signs and symptoms of electrolyte imbalances  - Administer electrolyte replacement as ordered  - Monitor response to electrolyte replacements, including repeat lab results as appropriate  - Instruct patient on fluid and nutrition as appropriate  Outcome: Progressing     Problem: SKIN/TISSUE INTEGRITY - ADULT  Goal: Skin Integrity remains intact(Skin Breakdown Prevention)  Description: Assess:    -Assess extremities for adequate circulation and sensation     Bed Management:  -Have minimal linens on bed & keep smooth, unwrinkled  -Change linens as needed when moist or perspiring      Toileting:  -Offer bedside commode      Activity:  -Mobilize patient at least 6 times a day  -Encourage activity and walks on unit  -Encourage or provide ROM exercises   -Use appropriate equipment to lift or move patient in bed      Skin Care:  -Avoid use of baby powder, tape, friction and shearing, hot water or constrictive clothing  -Do not massage red bony areas    Outcome: Progressing  Goal: Incision(s), wounds(s) or drain site(s) healing without S/S of infection  Description: INTERVENTIONS  - Assess and document dressing, incision, wound bed, drain sites and surrounding tissue  - Provide patient and family education  Outcome: Progressing     Problem: HEMATOLOGIC - ADULT  Goal: Maintains hematologic stability  Description: INTERVENTIONS  - Assess for signs and symptoms of bleeding or hemorrhage  - Monitor labs  - Administer supportive blood products/factors as ordered and appropriate  Outcome: Progressing

## 2022-05-03 NOTE — PROGRESS NOTES
Progress Note - Urology  Froy Polk 1965, 61 y o  female MRN: [id number]    Unit/Bed#: S -01 Encounter: [id number]    Assessment and Plan:  1  Right renal mass consistent with RCC  - s/p Right robotic assisted partial nephrectomy (POD#1)  - Doing well  - Labs stable, VSS  - OOB and ambulating  - Gill catheter discontinued  - Follow up scheduled   - Discharge later today    Subjective:   HPI: Doing well overall  No acute events overnight  Patient laying comfortably in bed  VSS, labs stable  Some mild abdominal discomfort with sitting up and coughing  Review of Systems   Constitutional: Negative for activity change, appetite change, chills and fever  HENT: Negative for congestion and trouble swallowing  Respiratory: Negative for cough and shortness of breath  Cardiovascular: Negative for chest pain, palpitations and leg swelling  Gastrointestinal: Negative for abdominal pain, constipation, diarrhea, nausea and vomiting  Genitourinary: Negative for flank pain and hematuria  Musculoskeletal: Negative for back pain and gait problem  Skin: Negative for wound  Allergic/Immunologic: Negative for immunocompromised state  Neurological: Negative for dizziness and syncope  Hematological: Does not bruise/bleed easily  Psychiatric/Behavioral: Negative for confusion  All other systems reviewed and are negative  Objective:  Nursing Rounds:   Vitals: Blood pressure 123/74, pulse 74, temperature 97 8 °F (36 6 °C), resp  rate 16, SpO2 94 %  ,There is no height or weight on file to calculate BMI  Physical Exam  Constitutional:       General: She is not in acute distress  Appearance: Normal appearance  She is not ill-appearing, toxic-appearing or diaphoretic  HENT:      Head: Normocephalic  Nose: No congestion  Eyes:      General: No scleral icterus  Right eye: No discharge  Left eye: No discharge        Conjunctiva/sclera: Conjunctivae normal  Pupils: Pupils are equal, round, and reactive to light  Pulmonary:      Effort: Pulmonary effort is normal    Abdominal:      General: There is no distension  Palpations: Abdomen is soft  Genitourinary:     Comments: Healing well, no erythema, edema, drainage  Musculoskeletal:      Cervical back: Normal range of motion  Skin:     General: Skin is warm and dry  Coloration: Skin is not jaundiced or pale  Findings: No bruising, erythema, lesion or rash  Neurological:      General: No focal deficit present  Mental Status: She is alert and oriented to person, place, and time  Mental status is at baseline  Gait: Gait normal    Psychiatric:         Mood and Affect: Mood normal          Behavior: Behavior normal          Thought Content: Thought content normal          Judgment: Judgment normal          Imaging:    Imaging reviewed - both report and images personally reviewed       Labs:  Recent Labs     05/02/22  1128 05/03/22  0525   WBC 11 97* 10 37*     Recent Labs     05/02/22  1128 05/03/22  0525   HGB 14 6 12 2     Recent Labs     05/02/22  1128 05/03/22  0525   HCT 46 0 37 5     Recent Labs     05/02/22  1128 05/03/22  0525   CREATININE 0 94 0 80       History:  Past Medical History:   Diagnosis Date    [facility]     4/15/22 Pt reports right kidney cancer     GERD (gastroesophageal reflux disease)     4/15/22 Pt reports on occasion - takes no medications -reports using TUMS as needed    History of HPV infection     Hyperlipidemia     4/15/22 Pt reports cholesterol is borderline - on no medications at this time    Renal lesion     Seasonal allergies     4/15/22 Pt reports has shortness of breath only with seasonal allergies "tightness with breathing reported with allergies "     Social History     Socioeconomic History    Marital status: /Civil Union     Spouse name: None    Number of children: None    Years of education: None    Highest education level: None Occupational History    None   Tobacco Use    Smoking status: Never Smoker    Smokeless tobacco: Never Used    Tobacco comment: Denies any former or current use    Vaping Use    Vaping Use: Never used   Substance and Sexual Activity    Alcohol use: Never     Comment: Denies any former or current use of alcohol     Drug use: Never     Comment: Denies any former or current drug use     Sexual activity: Not Currently     Comment: Reports not active at this time   Other Topics Concern    None   Social History Narrative    None     Social Determinants of Health     Financial Resource Strain: Not on file   Food Insecurity: Not on file   Transportation Needs: Not on file   Physical Activity: Not on file   Stress: Not on file   Social Connections: Not on file   Intimate Partner Violence: Not on file   Housing Stability: Not on file     Past Surgical History:   Procedure Laterality Date    CERVIX SURGERY      Cervical cyst removed / also for HPV    COLONOSCOPY      DILATION AND CURETTAGE OF UTERUS      EGD      SOFT TISSUE MASS EXCISION      mass removed from back   [address]      4/15/22 Pt reports had fibroid removed - indicated cervical area      Family History   Problem Relation Age of Onset    No Known Problems Mother     Alzheimer's disease Father     No Known Problems Sister     No Known Problems Daughter        Hawk Franklin  Date: 5/3/2022 Time: 9:11 AM

## 2022-05-03 NOTE — UTILIZATION REVIEW
Initial Clinical Review    Elective Inpatient surgical procedure  Age/Sex: 61 y o  female  Surgery [email or web address]  Procedure: 1) right robotic-assisted partial nephrectomy  2) Intra-operative retroperitoneal ultrasound   Anesthesia: general  Operative Findings: EBL 50 ml, no complications, R renal mass removed intact, sent to pathology  POD#1 Progress Note: Pt has abdominal discomfort with sitting up and coughing  VSS, labs stable   WBC 10 37 today , creat 0 80 , hgb 12 2 from 14 6   Incision w/o erythema, drainage, edema   Pt using spirometer up to 1000 ml with goal 1500 ml  OOB ambulating  UOP 1660 ml last 24 hrs    Gill cath d/c'ed this am       Admission Orders: Date/Time/Statement:   Admission Orders (From admission, onward)     Ordered        05/02/22 1058  Inpatient Admission  Once                      Orders Placed This Encounter   Procedures    Inpatient Admission     Standing Status:   Standing     Number of Occurrences:   1     Order Specific Question:   Level of Care     Answer:   Med Surg [16]     Order Specific Question:   Estimated length of stay     Answer:   Inpatient Only Surgery     Vital Signs: /74   Pulse 74   Temp 97 8 °F (36 6 °C)   Resp 16   SpO2 94%     Pertinent Labs/Diagnostic Test Results:   No orders to display         Results from last 7 days   Lab Units 05/03/22  0525 05/02/22  1128   WBC Thousand/uL 10 37* 11 97*   HEMOGLOBIN g/dL 12 2 14 6   HEMATOCRIT % 37 5 46 0   PLATELETS Thousands/uL 303 312         Results from last 7 days   Lab Units 05/03/22  0525 05/02/22  1128   SODIUM mmol/L 141 141   POTASSIUM mmol/L 3 7 3 8   CHLORIDE mmol/L 108 106   CO2 mmol/L 27 27   ANION GAP mmol/L 6 8   BUN mg/dL 12 15   CREATININE mg/dL 0 80 0 94   EGFR ml/min/1 73sq m 81 67   CALCIUM mg/dL 8 0* 8 1*             Results from last 7 days   Lab Units 05/03/22  0525 05/02/22  1128   GLUCOSE RANDOM mg/dL 100 141*                       Diet: regular  Mobility:ambulation POD #0, OOB to chair  DVT Prophylaxis: ambulation , heparin, SCD's    Medications/Pain Control:   Scheduled Medications:  acetaminophen, 650 mg, Oral, Q6H PRADEEP  docusate sodium, 100 mg, Oral, BID  gabapentin, 300 mg, Oral, HS  heparin (porcine), 5,000 Units, Subcutaneous, Q8H PRADEEP  ketorolac, 30 mg, Intravenous, Q6H [facility]  latanoprost, 1 drop, Both Eyes, HS  loratadine, 10 mg, Oral, Daily  montelukast, 10 mg, Oral, HS  multivitamin stress formula, 1 tablet, Oral, Daily      Continuous IV Infusions:lactated ringers infusion  Rate: 125 mL/hr Dose: 125 mL/hr  Freq: Continuous Route: IV  Indications of Use: IV Hydration  Last Dose: 125 mL/hr (05/02/22 1356)  Start: 05/02/22 1345 End: 05/03/22 0155     PRN Meds:  fluticasone, 2 spray, Each Nare, BID PRN  HYDROmorphone, 0 5 mg, Intravenous, Q2H PRN  lactated ringers, 1,000 mL, Intravenous, Once PRN   And  lactated ringers, 1,000 mL, Intravenous, Once PRN  ondansetron, 4 mg, Intravenous, Q6H PRN x1 5/2  oxyCODONE, 10 mg, Oral, Q4H PRN  oxyCODONE, 5 mg, Oral, Q4H PRN x1 5/3  sodium chloride, 1,000 mL, Intravenous, Once PRN   And  sodium chloride, 1,000 mL, Intravenous, Once PRN        Network Utilization Review Department  ATTENTION: Please call with any questions or concerns to [phone] and carefully listen to the prompts so that you are directed to the right person  All voicemails are confidential   Renetta Weeks all requests for admission clinical reviews, approved or denied determinations and any other requests to dedicated fax number below belonging to the campus where the patient is receiving treatment   List of dedicated fax numbers for the Facilities:  [address] DENIALS (Administrative/Medical Necessity) [phone]   [address] (Maternity/NICU/Pediatrics) [phone]   [address] [place] [id number]  [address] Alessandro Raymundochang Christie 6327 [id number] [place] [address] Raoul Robertson 1481 [address] [place] [address] [phone]

## 2022-05-04 VITALS
RESPIRATION RATE: 16 BRPM | TEMPERATURE: 98.2 F | DIASTOLIC BLOOD PRESSURE: 96 MMHG | SYSTOLIC BLOOD PRESSURE: 137 MMHG | HEART RATE: 94 BPM | OXYGEN SATURATION: 95 %

## 2022-05-04 LAB
ALBUMIN SERPL BCP-MCNC: 3.3 G/DL (ref 3.5–5)
ALP SERPL-CCNC: 61 U/L (ref 34–104)
ALT SERPL W P-5'-P-CCNC: 29 U/L (ref 7–52)
ANION GAP SERPL CALCULATED.3IONS-SCNC: 8 MMOL/L (ref 4–13)
AST SERPL W P-5'-P-CCNC: 34 U/L (ref 13–39)
BASOPHILS # BLD AUTO: 0.03 THOUSANDS/ΜL (ref 0–0.1)
BASOPHILS NFR BLD AUTO: 0 % (ref 0–1)
BILIRUB SERPL-MCNC: 1.12 MG/DL (ref 0.2–1)
BUN SERPL-MCNC: 12 MG/DL (ref 5–25)
CALCIUM ALBUM COR SERPL-MCNC: 8.5 MG/DL (ref 8.3–10.1)
CALCIUM SERPL-MCNC: 7.9 MG/DL (ref 8.4–10.2)
CHLORIDE SERPL-SCNC: 109 MMOL/L (ref 96–108)
CO2 SERPL-SCNC: 23 MMOL/L (ref 21–32)
CREAT SERPL-MCNC: 0.64 MG/DL (ref 0.6–1.3)
EOSINOPHIL # BLD AUTO: 0.14 THOUSAND/ΜL (ref 0–0.61)
EOSINOPHIL NFR BLD AUTO: 2 % (ref 0–6)
ERYTHROCYTE [DISTWIDTH] IN BLOOD BY AUTOMATED COUNT: 13.4 % (ref 11.6–15.1)
GFR SERPL CREATININE-BSD FRML MDRD: 98 ML/MIN/1.73SQ M
GLUCOSE SERPL-MCNC: 93 MG/DL (ref 65–140)
HCT VFR BLD AUTO: 39.8 % (ref 34.8–46.1)
HGB BLD-MCNC: 13.1 G/DL (ref 11.5–15.4)
IMM GRANULOCYTES # BLD AUTO: 0.03 THOUSAND/UL (ref 0–0.2)
IMM GRANULOCYTES NFR BLD AUTO: 0 % (ref 0–2)
LYMPHOCYTES # BLD AUTO: 2.29 THOUSANDS/ΜL (ref 0.6–4.47)
LYMPHOCYTES NFR BLD AUTO: 24 % (ref 14–44)
MCH RBC QN AUTO: 30.6 PG (ref 26.8–34.3)
MCHC RBC AUTO-ENTMCNC: 32.9 G/DL (ref 31.4–37.4)
MCV RBC AUTO: 93 FL (ref 82–98)
MONOCYTES # BLD AUTO: 0.6 THOUSAND/ΜL (ref 0.17–1.22)
MONOCYTES NFR BLD AUTO: 6 % (ref 4–12)
NEUTROPHILS # BLD AUTO: 6.33 THOUSANDS/ΜL (ref 1.85–7.62)
NEUTS SEG NFR BLD AUTO: 68 % (ref 43–75)
NRBC BLD AUTO-RTO: 0 /100 WBCS
PLATELET # BLD AUTO: 294 THOUSANDS/UL (ref 149–390)
PMV BLD AUTO: 9.6 FL (ref 8.9–12.7)
POTASSIUM SERPL-SCNC: 3.6 MMOL/L (ref 3.5–5.3)
PROT SERPL-MCNC: 5.7 G/DL (ref 6.4–8.4)
RBC # BLD AUTO: 4.28 MILLION/UL (ref 3.81–5.12)
SODIUM SERPL-SCNC: 140 MMOL/L (ref 135–147)
WBC # BLD AUTO: 9.42 THOUSAND/UL (ref 4.31–10.16)

## 2022-05-04 PROCEDURE — 99024 POSTOP FOLLOW-UP VISIT: CPT | Performed by: PHYSICIAN ASSISTANT

## 2022-05-04 PROCEDURE — 80053 COMPREHEN METABOLIC PANEL: CPT | Performed by: PHYSICIAN ASSISTANT

## 2022-05-04 PROCEDURE — 85025 COMPLETE CBC W/AUTO DIFF WBC: CPT | Performed by: PHYSICIAN ASSISTANT

## 2022-05-04 RX ORDER — BUTALBITAL, ACETAMINOPHEN AND CAFFEINE 50; 325; 40 MG/1; MG/1; MG/1
1 TABLET ORAL ONCE
Status: COMPLETED | OUTPATIENT
Start: 2022-05-04 | End: 2022-05-04

## 2022-05-04 RX ADMIN — DOCUSATE SODIUM 100 MG: 100 CAPSULE, LIQUID FILLED ORAL at 08:15

## 2022-05-04 RX ADMIN — KETOROLAC TROMETHAMINE 30 MG: 30 INJECTION, SOLUTION INTRAMUSCULAR at 05:24

## 2022-05-04 RX ADMIN — FLUTICASONE PROPIONATE 2 SPRAY: 50 SPRAY, METERED NASAL at 11:52

## 2022-05-04 RX ADMIN — B-COMPLEX W/ C & FOLIC ACID TAB 1 TABLET: TAB at 08:15

## 2022-05-04 RX ADMIN — BUTALBITAL, ACETAMINOPHEN AND CAFFEINE 1 TABLET: 50; 325; 40 TABLET ORAL at 08:15

## 2022-05-04 RX ADMIN — HEPARIN SODIUM 5000 UNITS: 5000 INJECTION INTRAVENOUS; SUBCUTANEOUS at 05:24

## 2022-05-04 RX ADMIN — ACETAMINOPHEN 650 MG: 325 TABLET ORAL at 05:24

## 2022-05-04 NOTE — PROGRESS NOTES
UROLOGY PROGRESS NOTE   Patient Identifiers: Eze Billy (MRN [id number])  Date of Service: 5/4/2022        Assessment:     Right renal mass POD#2 s/p Right robotic assisted partial nephrectomy    Plan:   -labs stable  -VSS  -OOB and ambulating  -removed JESÚS this AM  -plans for discharge this morning        Subjective:     Patient stayed overnight due to some postoperative reports of weakness    Passing flatus and small bowel movement yesterday  Reports a lingering headache  Gill removed and voiding without issue    Objective:     VITALS:    Vitals:    05/04/22 0656   BP: 120/73   Pulse: 82   Resp: 16   Temp: 98 1 °F (36 7 °C)   SpO2: 96%       INS & OUTS:  [unfilled]    LABS:  Lab Results   Component Value Date    HGB 13 1 05/04/2022    HCT 39 8 05/04/2022    WBC 9 42 05/04/2022     05/04/2022   ]    Lab Results   Component Value Date    K 3 7 05/03/2022     05/03/2022    CO2 27 05/03/2022    BUN 12 05/03/2022    CREATININE 0 80 05/03/2022    CALCIUM 8 0 (L) 05/03/2022   ]    INPATIENT MEDS:    Current Facility-Administered Medications:     acetaminophen (TYLENOL) tablet 650 mg, 650 mg, Oral, Q6H [facility], Brigido Munguia MD, 650 mg at 05/04/22 0524    docusate sodium (COLACE) capsule 100 mg, 100 mg, Oral, BID, Brigido Munguia MD, 100 mg at 05/03/22 1815    fluticasone (FLONASE) 50 mcg/act nasal spray 2 spray, 2 spray, Each Nare, BID PRN, Brigido Munguia MD, 2 spray at 05/03/22 2308    gabapentin (NEURONTIN) capsule 300 mg, 300 mg, Oral, HS, Brigido Munguia MD, 300 mg at 05/03/22 2157    heparin (porcine) subcutaneous injection 5,000 Units, 5,000 Units, Subcutaneous, Q8H [facility], Brigido Munguia MD, 5,000 Units at 05/04/22 0524    HYDROmorphone (DILAUDID) injection 0 5 mg, 0 5 mg, Intravenous, Q2H PRN, Brigido Munguia MD    ketorolac (TORADOL) injection 30 mg, 30 mg, Intravenous, Q6H [facility], Brigido Munguia MD, 30 mg at 05/04/22 0524    latanoprost (XALATAN) 0 005 % ophthalmic solution 1 drop, 1 drop, Both Eyes, HS, Goran Hackett MD, 1 drop at 05/03/22 2157    loratadine (CLARITIN) tablet 10 mg, 10 mg, Oral, Daily, Goran Hackett MD    montelukast (SINGULAIR) tablet 10 mg, 10 mg, Oral, HS, Goran Hackett MD, 10 mg at 05/03/22 2157    multivitamin stress formula tablet 1 tablet, 1 tablet, Oral, Daily, Goran Hackett MD, 1 tablet at 05/03/22 0833    ondansetron (ZOFRAN) injection 4 mg, 4 mg, Intravenous, Q6H PRN, Goran Hackett MD, 4 mg at 05/02/22 1645    oxyCODONE (ROXICODONE) IR tablet 10 mg, 10 mg, Oral, Q4H PRN, Goran Hackett MD    oxyCODONE (ROXICODONE) IR tablet 5 mg, 5 mg, Oral, Q4H PRN, Goran Hackett MD, 5 mg at 05/03/22 0834      Physical Exam:   /73   Pulse 82   Temp 98 1 °F (36 7 °C)   Resp 16   SpO2 96%   GEN: alert and oriented x3  RESP: breathing comfortably with no accessory muscle use  CV: no significant peripheral edema   B/l SCDs in place  ABD: soft and appropriately tender to palpation  INCISION: clean dry and intact  DRAINS: serosanguineous  GORDON: none

## 2022-05-05 ENCOUNTER — NURSE TRIAGE (OUTPATIENT)
Dept: OTHER | Facility: OTHER | Age: 57
End: 2022-05-05

## 2022-05-05 LAB
ABO GROUP BLD BPU: NORMAL
ABO GROUP BLD BPU: NORMAL
BPU ID: NORMAL
BPU ID: NORMAL
CROSSMATCH: NORMAL
CROSSMATCH: NORMAL
UNIT DISPENSE STATUS: NORMAL
UNIT DISPENSE STATUS: NORMAL
UNIT PRODUCT CODE: NORMAL
UNIT PRODUCT CODE: NORMAL
UNIT PRODUCT VOLUME: 350 ML
UNIT PRODUCT VOLUME: 350 ML
UNIT RH: NORMAL
UNIT RH: NORMAL

## 2022-05-05 NOTE — TELEPHONE ENCOUNTER
Reason for Disposition   Throat pain after surgery, questions about    Answer Assessment - Initial Assessment Questions  1  SYMPTOM: "What's the main symptom you're concerned about?" (e g , pain, fever, vomiting)      Sore throat   2  ONSET: "When did sore throat  start?"      2/3/2022 night   3  SURGERY: "What surgery was performed?"      Robotic Lap Partial Nephrectomy   4  DATE of SURGERY: "When was surgery performed?"       5/2/2022  5  ANESTHESIA: " What type of anesthesia did you have?" (e g , general, spinal, epidural, local)      general  6  PAIN: "Is there any pain?" If Yes, ask: "How bad is it?"  (Scale 1-10; or mild, moderate, severe)      6-7/10  7  FEVER: "Do you have a fever?" If Yes, ask: "What is your temperature, how was it measured, and when did it start?"      97 8  8  VOMITING: "Is there any vomiting?" If yes, ask: "How many times?"      no  9  BLEEDING: "Is there any bleeding?" If Yes, ask: "How much?" and "Where?"      no  10   OTHER SYMPTOMS: "Do you have any other symptoms?" (e g , drainage from wound, painful urination, constipation)        Throat is red and swelling    Protocols used: POST-OP SYMPTOMS AND QUESTIONS-[facility]

## 2022-05-05 NOTE — UTILIZATION REVIEW
Notification of Discharge   This is a Notification of Discharge from our facility [address]  Please be advised that this patient has been discharge from our facility  Below you will find the admission and discharge date and time including the patients disposition  UTILIZATION REVIEW CONTACT:  Nereida Cook MA  Utilization   Network Utilization Review Department  Phone: [phone] x carefully listen to the prompts  All voicemails are confidential   Email: [email or web address]     PHYSICIAN ADVISORY SERVICES:  FOR [facility] REVIEW - MEDICAL NECESSITY DENIAL  Phone: [phone]  Fax: [phone]  Email: [email or web address]     PRESENTATION DATE: 5/2/2022  6:24 AM  OBERVATION ADMISSION DATE:   INPATIENT ADMISSION DATE: 5/2/22 10:58 AM   DISCHARGE DATE: 5/4/2022  2:22 PM  DISPOSITION: Home/Self Care Home/Self Care      IMPORTANT INFORMATION:  Send all requests for admission clinical reviews, approved or denied determinations and any other requests to dedicated fax number below belonging to the campus where the patient is receiving treatment   List of dedicated fax numbers:  [address] DENIALS (Administrative/Medical Necessity) [phone]   [address] [place] [address] (Maternity/NICU/Pediatrics) [phone]   Sara Ferreira [phone]   130 [facility] [phone]   [address] [place] [address] [phone]   2000 [facility] [address] [place] [address],[address] [phone]   [facility]  [phone]   [address] [place] [address], [address]  [address]01 [facility] 1000 [address] [facility] [phone]

## 2022-05-05 NOTE — TELEPHONE ENCOUNTER
Regarding: surgery issues- pressure in chest-sore throat  ----- Message from [facility] sent at 5/5/2022 12:16 PM EDT -----  "I had surgery on Monday  I am currently experiencing  a sore throat  My tonsils are red and raw I have pain in my ear   I have a little pressure in my chest   I would like to know if this has something to do with the Dara "

## 2022-05-06 NOTE — TELEPHONE ENCOUNTER
Called and spoke to patient  Patient stated she is feeling better today  Patient did have one concern  Patient was showering and covered the large abd pad with plastic bag so it doesn't get wet  Patient stated she noticed small amount of blood from a smaller incision site  Patient placed band-aid over incision site and seems to be managed well  Informed patient to monitor and call our office with any changes  Patient verbalized understanding

## 2022-05-07 ENCOUNTER — NURSE TRIAGE (OUTPATIENT)
Dept: OTHER | Facility: OTHER | Age: 57
End: 2022-05-07

## 2022-05-07 NOTE — TELEPHONE ENCOUNTER
[facility] on call provider; recommended patient be seen at ED to r/o PE  Spoke with patient and explained importance of her being seen at ED due to her symptoms; patient verbalized understanding  Closest hospital to patient is [facility]; patient stated she felt well enough to drive herself  Advised patient is symptoms worsen to call EMS; patient stated that she only lives a few minutes away from the hospital  Informed patient to tell them that she just had surgery and is experiencing chest pressure and SOB; on call wanted her seen to be evaluated for PE or any other concerns  Asked patient to call back upon discharge with any further questions or concerns

## 2022-05-07 NOTE — TELEPHONE ENCOUNTER
Reason for Disposition   Sounds like a serious complication to the triager    Answer Assessment - Initial Assessment Questions  1  SYMPTOM: "What's the main symptom you're concerned about?" (e g , pain, fever, vomiting)      Having SOB (says that she has to catch her breath at times and is very subtle) and bringing up a lot of phlegm; phlegm has been red in color; today is dark red in color  Initially when patient came home throat was bothering her and red in color  Patient also complaining of chest pressure on the right side of the chest; pain is mild-also feels pressure in the middle of chest  Patient stated she isn't sure if its heart burn related; took some Tums and did not help  Patient states she has not had this type of pain or symptoms before, but has had heartburn in the past  Denies any worsening of symptoms with exertion  Patient stated that every day her symptoms are different  Patient also having right sided kidney pain; mild  Patient has been taking tylenol and medications as ordered  Incision site ok, patient stated that there is a lot of bruising  Patient went to urgent care at [facility] today-said that they can't rule out if its blood clots  Per patient, said that her lungs sounded clear  Patient stated that they suggested to call the urology office for further advice  [facility] did mention concern for blood clot, but didn't recommend going to hospital      Patient stated that throughout the day a few times she has to clear her throat and doesn't always cough it up  When she did bring up the amount of dark blood phlegm, about the size of thumb nail  Patient stated it was very thick mucous  Denies any hx of heart disease  2  ONSET: "When did symptoms  start?"      Since a few days surgery  3  SURGERY: "What surgery was performed?"      Robotic laparoscopic partial nephrectomy   4  DATE of SURGERY: "When was surgery performed?"       5/2/2022  5   ANESTHESIA: " What type of anesthesia did you have?" (e g , general, spinal, epidural, local)      general  6  PAIN: "Is there any pain?" If Yes, ask: "How bad is it?"  (Scale 1-10; or mild, moderate, severe)      Pain mostly right chest area, middle of chest, and also right sided flank pain where she was having pain and in the abdomen in the front  7  FEVER: "Do you have a fever?" If Yes, ask: "What is your temperature, how was it measured, and when did it start?"      Denies fever  8  VOMITING: "Is there any vomiting?" If yes, ask: "How many times?"      States that she does feel some nausea and feels like she needs to belch  9  BLEEDING: "Is there any bleeding?" If Yes, ask: "How much?" and "Where?"      Denies any incisional bleeding     10  OTHER SYMPTOMS: "Do you have any other symptoms?" (e g , drainage from wound, painful urination, constipation)        See above    Protocols used: POST-OP SYMPTOMS AND QUESTIONS-[facility]

## 2022-05-07 NOTE — TELEPHONE ENCOUNTER
Regarding: post op - hard to catch breath   ----- Message from Sebastian Dunn sent at 5/7/2022  6:06 PM EDT -----  "Once every so often I feel like Im out of breath  I feel like I have to gasp for air  I have pain in my chest on right side and then also have pressure in the middle that feels indigestion  I feel like it could be phlegm, I had a lot of blood in my mucus after sx bc of the anesthesia  I am not sure  I went to [facility] today and they said I sound clear in my lungs  "

## 2022-05-09 NOTE — TELEPHONE ENCOUNTER
Workup in the ED was negative for pulmonary embolism  Would recommend she follow-up as scheduled for her postoperative visit

## 2022-05-09 NOTE — TELEPHONE ENCOUNTER
Returned call to patient   Advised patient per providers recommendation  Patient verbalized understanding

## 2022-05-09 NOTE — TELEPHONE ENCOUNTER
Returned call to patient   Patient state she is feeling better  No blood in sputum since Friday  Patient states" that she noticed the blood when she was clearing her throat and coughed it up, not sure if it was related to drip in her sinuses "    Denies , fever ,chills,sob  Patient states that the pain moves around , intermittent, "mild"  Urinating without difficulty reported as clear to yellow

## 2022-05-10 ENCOUNTER — NURSE TRIAGE (OUTPATIENT)
Dept: OTHER | Facility: OTHER | Age: 57
End: 2022-05-10

## 2022-05-10 ENCOUNTER — OFFICE VISIT (OUTPATIENT)
Dept: UROLOGY | Facility: CLINIC | Age: 57
End: 2022-05-10

## 2022-05-10 VITALS
TEMPERATURE: 98.6 F | SYSTOLIC BLOOD PRESSURE: 122 MMHG | WEIGHT: 122 LBS | BODY MASS INDEX: 20.83 KG/M2 | HEIGHT: 64 IN | DIASTOLIC BLOOD PRESSURE: 82 MMHG

## 2022-05-10 DIAGNOSIS — N28.89 RENAL MASS, RIGHT: Primary | ICD-10-CM

## 2022-05-10 PROCEDURE — 99024 POSTOP FOLLOW-UP VISIT: CPT | Performed by: PHYSICIAN ASSISTANT

## 2022-05-10 NOTE — TELEPHONE ENCOUNTER
Regarding:  itchiness around incision site   ----- Message from [facility] sent at 5/10/2022  9:00 AM EDT -----  "I had surgery on 5/2/2022  I am now experiencing itchiness around my incision site    It also feels warm "

## 2022-05-10 NOTE — TELEPHONE ENCOUNTER
Nurses do not have any availability today   You can utilize same day/next day with AP if patient is requesting to be seen

## 2022-05-10 NOTE — TELEPHONE ENCOUNTER
Spoke with Heidi Stein in the office she will check with the nurse to see if pt can come in  Sounds like normal healing via the phone but pt requesting to stop by office to have it looked at

## 2022-05-10 NOTE — PROGRESS NOTES
5/10/2022      Chief Complaint   Patient presents with    Other         Assessment and Plan    61 y o  female -- Dr Lane Rojas    1  Postoperative evaluation  - Exam today benign, no concerns for infection  - Discussed with patient itching most likely due to healing  - Advised topical antibiotic ointment and benedryl for itching  - Follow up as scheduled next week  - All questions answered; patient understands and agrees with plan      History of Present Illness  Eze Billy is a 61 y o  female patient of Dr Lane Rojas with history of renal mass s/p partial nephrectomy here for evaluation of incision site  Patient states she has been having itching and irritation around incision site for the past day  Denies drainage, fever, chills, nausea, vomiting  Denies any other symptoms  Review of Systems   Constitutional: Negative for activity change, appetite change, chills and fever  HENT: Negative for congestion and trouble swallowing  Respiratory: Negative for cough and shortness of breath  Cardiovascular: Negative for chest pain, palpitations and leg swelling  Gastrointestinal: Negative for abdominal pain, constipation, diarrhea, nausea and vomiting  Genitourinary: Negative for difficulty urinating, dysuria, flank pain, frequency, hematuria and urgency  Musculoskeletal: Negative for back pain and gait problem  Skin: Negative for wound  Allergic/Immunologic: Negative for immunocompromised state  Neurological: Negative for dizziness and syncope  Hematological: Does not bruise/bleed easily  Psychiatric/Behavioral: Negative for confusion  All other systems reviewed and are negative  Vitals  Vitals:    05/10/22 1352   BP: 122/82   BP Location: Left arm   Patient Position: Sitting   Cuff Size: Adult   Temp: 98 6 °F (37 °C)   Weight: 55 3 kg (122 lb)   Height: 5' 4" (1 626 m)       Physical Exam  Constitutional:       General: She is not in acute distress       Appearance: Normal appearance  She is not ill-appearing, toxic-appearing or diaphoretic  HENT:      Head: Normocephalic  Nose: No congestion  Eyes:      General: No scleral icterus  Right eye: No discharge  Left eye: No discharge  Conjunctiva/sclera: Conjunctivae normal       Pupils: Pupils are equal, round, and reactive to light  Pulmonary:      Effort: Pulmonary effort is normal    Genitourinary:     Comments: Incision site clean, dry, minimal warmth, erythema and excoriations from itching  Musculoskeletal:      Cervical back: Normal range of motion  Skin:     General: Skin is warm and dry  Coloration: Skin is not jaundiced or pale  Findings: No bruising, erythema, lesion or rash  Neurological:      General: No focal deficit present  Mental Status: She is alert and oriented to person, place, and time  Mental status is at baseline  Gait: Gait normal    Psychiatric:         Mood and Affect: Mood normal          Behavior: Behavior normal          Thought Content:  Thought content normal          Judgment: Judgment normal            Past History  Past Medical History:   Diagnosis Date    Cancer ([address] )     4/15/22 Pt reports right kidney cancer     GERD (gastroesophageal reflux disease)     4/15/22 Pt reports on occasion - takes no medications -reports using TUMS as needed    History of HPV infection     Hyperlipidemia     4/15/22 Pt reports cholesterol is borderline - on no medications at this time    Renal lesion     Seasonal allergies     4/15/22 Pt reports has shortness of breath only with seasonal allergies "tightness with breathing reported with allergies "     Social History     Socioeconomic History    Marital status: /Civil Union     Spouse name: None    Number of children: None    Years of education: None    Highest education level: None   Occupational History    None   Tobacco Use    Smoking status: Never Smoker    Smokeless tobacco: Never Used   Ashley Reyes Tobacco comment: Denies any former or current use    Vaping Use    Vaping Use: Never used   Substance and Sexual Activity    Alcohol use: Never     Comment: Denies any former or current use of alcohol     Drug use: Never     Comment: Denies any former or current drug use     Sexual activity: Not Currently     Comment: Reports not active at this time   Other Topics Concern    None   Social History Narrative    None     Social Determinants of Health     Financial Resource Strain: Not on file   Food Insecurity: Not on file   Transportation Needs: Not on file   Physical Activity: Not on file   Stress: Not on file   Social Connections: Not on file   Intimate Partner Violence: Not on file   Housing Stability: Not on file     Social History     Tobacco Use   Smoking Status Never Smoker   Smokeless Tobacco Never Used   Tobacco Comment    Denies any former or current use      Family History   Problem Relation Age of Onset    No Known Problems Mother     Alzheimer's disease Father     No Known Problems Sister     No Known Problems Daughter        The following portions of the patient's history were reviewed and updated as appropriate: allergies, current medications, past medical history, past social history, past surgical history and problem list     Results  No results found for this or any previous visit (from the past 1 hour(s))  ]  No results found for: PSA  Lab Results   Component Value Date    CALCIUM 7 9 (L) 05/04/2022    K 3 6 05/04/2022    CO2 23 05/04/2022     (H) 05/04/2022    BUN 12 05/04/2022    CREATININE 0 64 05/04/2022     Lab Results   Component Value Date    WBC 9 42 05/04/2022    HGB 13 1 05/04/2022    HCT 39 8 05/04/2022    MCV 93 05/04/2022     05/04/2022       Eva Medina PA-C

## 2022-05-10 NOTE — TELEPHONE ENCOUNTER
Reason for Disposition   Patient wants to be seen    Answer Assessment - Initial Assessment Questions  1  SYMPTOM: "What's the main symptom you're concerned about?" (e g , redness, pain, drainage)      Redness and itchyness  2  ONSET: "When did this start?"      Yesterday saw it was red  3  SURGERY: "What surgery was performed?"      Right nephrectomy  4  DATE of SURGERY: "When was surgery performed?"       5/2  5  INCISION SITE: "Where is the incision located?"       Right abdomin  6  REDNESS: "Is there any redness at the incision site?" If yes, ask: "How wide across is the redness?" (Inches, centimeters)       Yes right around the inscision  7  PAIN: "Is there any pain?" If Yes, ask: "How bad is it?"  (Scale 1-10; or mild, moderate, severe)      denies  8  BLEEDING: "Is there any bleeding?" If Yes, ask: "How much?" and "Where?"      denies  9  DRAINAGE: "Is there any drainage from the incision site?" If yes, ask: "What color and how much?" (e g , red, cloudy, pus; drops, teaspoon)      denies  10  FEVER: "Do you have a fever?" If Yes, ask: "What is your temperature, how was it measured, and when did it start?"        denies  11   OTHER SYMPTOMS: "Do you have any other symptoms?" (e g , shaking chills, weakness, rash elsewhere on body)        denies    Protocols used: POST-OP INCISION SYMPTOMS AND QUESTIONS-ADULT-[place]

## 2022-05-16 ENCOUNTER — TELEPHONE (OUTPATIENT)
Dept: UROLOGY | Facility: CLINIC | Age: 57
End: 2022-05-16

## 2022-05-16 NOTE — TELEPHONE ENCOUNTER
Patient scheduled tomorrow afternoon for postop from partial nephrectomy performed on May 2nd  Pathology is still pending  Could you please call the pathology department check on the status?   Hopefully we can at the very least have a preliminary if not a final pathology resulted by tomorrow

## 2022-05-17 ENCOUNTER — OFFICE VISIT (OUTPATIENT)
Dept: UROLOGY | Facility: CLINIC | Age: 57
End: 2022-05-17

## 2022-05-17 VITALS
HEART RATE: 90 BPM | BODY MASS INDEX: 20.86 KG/M2 | OXYGEN SATURATION: 99 % | WEIGHT: 122.2 LBS | DIASTOLIC BLOOD PRESSURE: 70 MMHG | SYSTOLIC BLOOD PRESSURE: 128 MMHG | RESPIRATION RATE: 16 BRPM | HEIGHT: 64 IN

## 2022-05-17 DIAGNOSIS — C49.9 LIPOSARCOMA (HCC): ICD-10-CM

## 2022-05-17 DIAGNOSIS — D17.71 ANGIOMYOLIPOMA OF RIGHT KIDNEY: Primary | ICD-10-CM

## 2022-05-17 PROCEDURE — 99024 POSTOP FOLLOW-UP VISIT: CPT | Performed by: UROLOGY

## 2022-05-17 PROCEDURE — 3008F BODY MASS INDEX DOCD: CPT | Performed by: DERMATOLOGY

## 2022-05-17 NOTE — PROGRESS NOTES
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  - patient is undergoing additional molecular testing of her resection specimen and an MRI of her shoulder to determine if any adjuvant treatment is necessary (Dr Paradise Montanez)    I was very happy to review that the patient's right renal tumor was a benign lesion noticed angiomyolipoma  I have spoken directly to Dr Tierney Morales of pathology was provided the diagnosis of the benign angio myelolipoma  We did discuss her recent history of a liposarcoma involving the shoulder  Additional molecular stains have been carried out , which in Dr Jina Lindsay opinion inclusive that her kidney lesion was a benign angiomyolipoma and not a liposarcoma    I will communicate copy of the pathology report and today's office note to the patient's medical oncologist Dr Paradise Montanez  Discussed consideration of genetic testing and the patient will discuss this with her medical oncology team   The only hereditary syndrome I am aware of that predisposes to angiomyolipoma is would be tuberous sclerosis  I though there are cutaneous manifestations of this syndrome, and the patient has no of the typical sequela of this such as mental retardation or seizures    Typically no regular radiographic surveillance is required for these lesions as they are benign    I have recommended 1 final CT scan at 3 months postoperatively  Will see the patient back at that time  Assuming the results are normal he will follow up with Urology on an as-needed basis  Finally I have recommended continued with topical corticosteroids and oral prednisone prescribed by urgent care for the patient's dermatitis  Sirisha Gerber MD      Chief Complaint     Postop      History of Present Illness     Francine Cross is a 61 y o  referred in consultation for right renal mass  She is an extraordinarily pleasant 70-year-old female generally healthy  She recently noted a palpable bulge under the skin of her right shoulder  This was treated with a surgical resection and the pathology revealed a well-differentiated liposarcoma  The patient then underwent a staging evaluation including imaging of the chest abdomen pelvis  A 1 cm exophytic posteriorly oriented solid nodule with contrast enhancement was noted    Findings were consistent with renal cell carcinoma  I presented the options the patient including conservative management percutaneous cryoablation and surgical resection  SHe is now 2 weeks status post robotic partial nephrectomy  Detailed Urologic History     - please refer to HPI    Review of Systems     Review of Systems   Constitutional: Negative for activity change and fatigue  HENT: Negative for congestion  Eyes: Negative for visual disturbance  Respiratory: Negative for shortness of breath and wheezing  Cardiovascular: Negative for chest pain and leg swelling  Gastrointestinal: Negative for abdominal pain  Genitourinary: Negative for dysuria, flank pain, hematuria and urgency  Musculoskeletal: Negative for back pain  Allergic/Immunologic: Negative for immunocompromised state  Neurological: Negative for dizziness and numbness  Psychiatric/Behavioral: Negative for dysphoric mood  All other systems reviewed and are negative              Allergies     Allergies   Allergen Reactions    Other Sneezing     Cats, dogs, seasonal - pt reports hard to breath and coughing with these allergies        Physical Exam     Physical Exam  Constitutional:       Appearance: She is well-developed  HENT:      Head: Normocephalic and atraumatic  Eyes:      Pupils: Pupils are equal, round, and reactive to light  Cardiovascular:      Comments: Negative lower extremity edema  Pulmonary:      Effort: Pulmonary effort is normal       Breath sounds: Normal breath sounds  Abdominal:      Palpations: Abdomen is soft  Genitourinary:     Vagina: Normal       Comments: Postop incisions are well healed with no palpable hernia  Musculoskeletal:         General: Normal range of motion  Cervical back: Normal range of motion  Skin:     General: Skin is warm  Neurological:      Mental Status: She is alert and oriented to person, place, and time               Vital Signs  Vitals:    05/17/22 1432   Height: 5' 4" (1 626 m)         Current Medications       Current Outpatient Medications:     acetaminophen (TYLENOL) 325 mg tablet, Take 2 tablets (650 mg total) by mouth every 4 (four) hours as needed for mild pain, Disp: 30 tablet, Rfl: 0    Cetirizine HCl 10 MG CAPS, Take 10 mg by mouth if needed, Disp: , Rfl:     diclofenac sodium (VOLTAREN) 50 mg EC tablet, Take 1 tablet (50 mg total) by mouth 3 (three) times a day for 7 days, Disp: 21 tablet, Rfl: 0    docusate sodium (COLACE) 100 mg capsule, Take 1 capsule (100 mg total) by mouth 2 (two) times a day for 15 days, Disp: 30 capsule, Rfl: 0    fluticasone (VERAMYST) 27 5 MCG/SPRAY nasal spray, 2 sprays into each nostril as needed, Disp: , Rfl:     latanoprost (XALATAN) 0 005 % ophthalmic solution, Administer 1 drop to both eyes daily at bedtime  , Disp: , Rfl:     loratadine (CLARITIN) 10 mg tablet, Take 10 mg by mouth as needed (Patient not taking: Reported on 4/28/2022 ), Disp: , Rfl:     montelukast (SINGULAIR) 10 mg tablet, TAKE 1 TABLET BY MOUTH DAILY AT BEDTIME (Patient not taking: Reported on 12/22/2021), Disp: 90 tablet, Rfl: 3    multivitamin (THERAGRAN) TABS, Take 1 tablet by mouth daily, Disp: , Rfl:     pantoprazole (PROTONIX) 40 mg tablet, Take 1 tablet (40 mg total) by mouth daily 30 minutes before breakfast (Patient not taking: Reported on 4/28/2022 ), Disp: 30 tablet, Rfl: 1      Active Problems     Patient Active Problem List   Diagnosis    Gastroesophageal reflux disease with esophagitis    Chronic cough    Telangiectasias    Hyperglycemia    Vasomotor rhinitis    Right renal mass    Liposarcoma (HCC)    Glaucoma    Mixed hyperlipidemia    Osteoporosis    Subarachnoid hemorrhage (HCC)    Vulvar intraepithelial neoplasia (PHILIP) grade 3    Atypical lipomatous tumor ([facility] [address] )    Cough         Past Medical History     Past Medical History:   Diagnosis Date    Cancer ([facility] [place] [address] )     4/15/22 Pt reports right kidney cancer     GERD (gastroesophageal reflux disease)     4/15/22 Pt reports on occasion - takes no medications -reports using TUMS as needed    History of HPV infection     Hyperlipidemia     4/15/22 Pt reports cholesterol is borderline - on no medications at this time    Renal lesion     Seasonal allergies     4/15/22 Pt reports has shortness of breath only with seasonal allergies "tightness with breathing reported with allergies "         Surgical History     Past Surgical History:   Procedure Laterality Date    CERVIX SURGERY      Cervical cyst removed / also for HPV    COLONOSCOPY      DILATION AND CURETTAGE OF UTERUS      EGD      AL LAP,PARTIAL NEPHRECTOMY Right 5/2/2022    Procedure: ROBOTIC LAPAROSCOPIC PARTIAL NEPHRECTOMY, INTRAOPERATIVE ULTRASOUND WITH INTERPRETATION;  Surgeon: Jacobo Mcconnell MD;  Location: [facility];  Service: Urology    SOFT TISSUE MASS EXCISION      mass removed from back   [address]      4/15/22 Pt reports had fibroid removed - indicated cervical area          Family History Family History   Problem Relation Age of Onset    No Known Problems Mother     Alzheimer's disease Father     No Known Problems Sister     No Known Problems Daughter          Social History     Social History     Social History     Tobacco Use   Smoking Status Never Smoker   Smokeless Tobacco Never Used   Tobacco Comment    Denies any former or current use          Pertinent Lab Values     Lab Results   Component Value Date    CREATININE 0 64 05/04/2022       No results found for: PSA    @RESULTRCNT(1H])@      Pertinent Imaging     Chest:   Lungs/pleura: Mild biapical pleural-parenchymal scarring  Minimal dependent   atelectatic changes within both lower lobes  4 x 7 mm nodular density seen   within the anterior right upper lobe on image 67, series 2, which is   indeterminate  A neoplastic process is the diagnosis of exclusion in light of   the patient's history, and a short-term follow-up exam in 2-3 months is   recommended for further evaluation, as this lesion is probably too small for   resolution on PET/CT or biopsy  Is no pleural effusion or pneumothorax  Central airways: Grossly patent  Mediastinum/lymph nodes: No pathologically enlarged mediastinal or hilar lymph   nodes  Vessels/heart: Thoracic aorta normal in caliber  Heart normal in size, with no   pericardial effusion  Chest wall: Minimal soft tissue stranding is seen within the subcutaneous fat of   the right posterior chest wall, which is presumably postsurgical in etiology   given the patient's history  There is no evidence of focal soft tissue mass   within this region  Abdomen:   Liver: Liver: Subcentimeter low-density lesion within the liver along the   gallbladder fossa measures fluid attenuation and likely reflects a small cyst      Spleen: Unremarkable  Pancreas: Unremarkable     Adrenal glands: Unremarkable  Gallbladder/bile ducts: Unremarkable       Kidneys/ureters: Subcentimeter low-density lesion within the right upper pole   kidney is too small to characterize but statistically represents a cyst  There   is a 1 0 x 0 8 cm exophytic enhancing lesion seen within the posterior aspect of   the right lower pole kidney which is concerning for a small renal cell   carcinoma  This was not present on the previous exam in 2012  No significant   hydronephrosis or renal calculus  Bowel: Small and large bowel normal in caliber  Appendix is normal in   appearance  Lymph nodes: No retroperitoneal or mesenteric adenopathy  Peritoneum: No free fluid, free intraperitoneal air, or fluid collection within   the abdomen  Vessels: Abdominal aorta normal in caliber with minimal atherosclerotic changes  Abdominal wall: Unremarkable  Pelvis:   Urinary bladder: Unremarkable  No bladder calculus  Lymph nodes: No pelvic adenopathy  Uterus: Multiple uterine fibroids with the largest seen within the left uterine   fundus measuring up to 4 1 cm in diameter  Bones: Mild degenerative changes within the spine  No evidence of focal   destructive osseous lesion  IMPRESSION:   Impression:   1  4 x 7 mm nodule within the anterior right upper lobe which is indeterminate   as described  Metastatic disease is the diagnosis of exclusion given the   patient's history, and a short-term follow-up exam in 2-3 months is recommended   for further evaluation  2  Mild soft tissue stranding within the subcutaneous fat of the right posterior   chest wall which is presumably postsurgical given the patient's history  No   evidence of focal mass within this region  3  1 0 x 0 8 cm exophytic enhancing lesion within the posterior right lower pole   kidney concerning for a small renal cell carcinoma  4  Other findings as above         Portions of the record may have been created with voice recognition software   Occasional wrong word or "sound a like" substitutions may have occurred due to the inherent limitations of voice recognition software   Read the chart carefully and recognize, using context, where substitutions have occurred

## 2022-05-24 ENCOUNTER — TELEPHONE (OUTPATIENT)
Dept: OTHER | Facility: OTHER | Age: 57
End: 2022-05-24

## 2022-05-26 ENCOUNTER — TELEPHONE (OUTPATIENT)
Dept: DERMATOLOGY | Facility: CLINIC | Age: 57
End: 2022-05-26

## 2022-05-26 NOTE — TELEPHONE ENCOUNTER
Please call  Unfortunately, 6/20 would be the earliest I could see her  I did think it was acne  She can send me photos through [employer] if that would help

## 2022-05-26 NOTE — TELEPHONE ENCOUNTER
Pt calling to state that she was seen prior to her surgery for rash and now after surgery still has rash with itching and feels hot  She is looking for an apt for re-evaluation  Please review and advise if there is something she can use on the rash until she can be seen at next available apt in [place] (since we have no apts)  Thank you! VM: Hi my name is Génesis Gonsalez  The first name is Oriana Davenport  My phone number is [phone]  Uhm, I came to see Doctor Hemant Quiles  Four weeks ago, right before my surgery, after my surgery, I was having bad rash, itching red and hot up  So it's lasted for three weeks so far and it's not going away  So I would like to see her ASAP  Uhm, police are looking forward to hear from you  Thank you

## 2022-05-26 NOTE — TELEPHONE ENCOUNTER
Spoke with patient states she think rash is due to surgery think she maybe allergic to surgical glue that was used  She will login to [employer] and send pictures

## 2022-05-31 ENCOUNTER — TELEPHONE (OUTPATIENT)
Dept: UROLOGY | Facility: MEDICAL CENTER | Age: 57
End: 2022-05-31

## 2022-05-31 NOTE — TELEPHONE ENCOUNTER
Was unaware she needed the serum creatinine drawn before her ct scan  Was curious if the CT department is unable to obtain contrast what other options there are for her

## 2022-05-31 NOTE — TELEPHONE ENCOUNTER
Patient of Dr Kathya Potts in [facility]    Patient called stating her ct scan for July has to be cancelled due to the shortage of the contrast  She wants to know if any other testing can be done  She wants to know because she has appointment in August to follow up with the results of ct scan  Please call her to see how to proceed      Patient can be reached at [phone]

## 2022-06-01 NOTE — TELEPHONE ENCOUNTER
Called and spoke to patient at this time  Informed patient of Ansley Bernal PACs recommendations    Patient verbalized understanding

## 2022-06-15 ENCOUNTER — TELEPHONE (OUTPATIENT)
Dept: UROLOGY | Facility: CLINIC | Age: 57
End: 2022-06-15

## 2022-06-15 NOTE — TELEPHONE ENCOUNTER
Please call the patient and let her know the good news, I did receive additional word from our pathologist confirming that her renal angiomyolipoma was benign      Please fax a copy of the final pathology report to the patient's medical oncologist   She will follow up with Urology as scheduled

## 2022-06-15 NOTE — TELEPHONE ENCOUNTER
Advised patient of good news and follow up  Patient did questions why she was ordered MRI as well as CT    Will call back to let her know

## 2022-06-20 ENCOUNTER — OFFICE VISIT (OUTPATIENT)
Dept: DERMATOLOGY | Facility: CLINIC | Age: 57
End: 2022-06-20
Payer: COMMERCIAL

## 2022-06-20 VITALS — WEIGHT: 122 LBS | HEIGHT: 64 IN | BODY MASS INDEX: 20.83 KG/M2 | TEMPERATURE: 98.2 F

## 2022-06-20 DIAGNOSIS — L50.9 URTICARIA: Primary | ICD-10-CM

## 2022-06-20 PROCEDURE — 99213 OFFICE O/P EST LOW 20 MIN: CPT | Performed by: DERMATOLOGY

## 2022-06-20 RX ORDER — ALENDRONATE SODIUM 70 MG/1
TABLET ORAL
COMMUNITY
Start: 2022-04-20

## 2022-06-20 NOTE — PROGRESS NOTES
[facility] Follow Up Note    Patient Name: Ammy Huston  Encounter Date: 06/20/2022    Today's Chief Concerns:  [place] Concern #1:  Rash on face       Current Medications:    Current Outpatient Medications:     acetaminophen (TYLENOL) 325 mg tablet, Take 2 tablets (650 mg total) by mouth every 4 (four) hours as needed for mild pain, Disp: 30 tablet, Rfl: 0    Cetirizine HCl 10 MG CAPS, Take 10 mg by mouth if needed, Disp: , Rfl:     fluticasone (VERAMYST) 27 5 MCG/SPRAY nasal spray, 2 sprays into each nostril as needed, Disp: , Rfl:     latanoprost (XALATAN) 0 005 % ophthalmic solution, Administer 1 drop to both eyes daily at bedtime  , Disp: , Rfl:     multivitamin (THERAGRAN) TABS, Take 1 tablet by mouth daily, Disp: , Rfl:     alendronate (FOSAMAX) 70 mg tablet, TAKE 1 TABLET BY MOUTH ONE TIME PER WEEK, Disp: , Rfl:     diclofenac sodium (VOLTAREN) 50 mg EC tablet, Take 1 tablet (50 mg total) by mouth 3 (three) times a day for 7 days, Disp: 21 tablet, Rfl: 0    docusate sodium (COLACE) 100 mg capsule, Take 1 capsule (100 mg total) by mouth 2 (two) times a day for 15 days, Disp: 30 capsule, Rfl: 0    loratadine (CLARITIN) 10 mg tablet, Take 10 mg by mouth as needed (Patient not taking: No sig reported), Disp: , Rfl:     montelukast (SINGULAIR) 10 mg tablet, TAKE 1 TABLET BY MOUTH DAILY AT BEDTIME (Patient not taking: No sig reported), Disp: 90 tablet, Rfl: 3    pantoprazole (PROTONIX) 40 mg tablet, Take 1 tablet (40 mg total) by mouth daily 30 minutes before breakfast (Patient not taking: No sig reported), Disp: 30 tablet, Rfl: 1    CONSTITUTIONAL:   Vitals:    06/20/22 1315   Temp: 98 2 °F (36 8 °C)   Weight: 55 3 kg (122 lb)   Height: 5' 4" (1 626 m)             Specific Alerts:    Have you been seen by a [facility] Dermatologist in the last 3 years? YES    Are you pregnant or planning to become pregnant? No    Are you currently or planning to be nursing or breast feeding? No    Allergies   Allergen Reactions    Other Sneezing     Cats, dogs, seasonal - pt reports hard to breath and coughing with these allergies        May we call your Preferred Phone number to discuss your specific medical information? YES    May we leave a detailed message that includes your specific medical information? YES    Have you traveled outside of the [facility] in the past 3 months? No    Do you currently have a pacemaker or defibrillator? No    Do you have any artificial heart valves, joints, plates, screws, rods, stents, pins, etc? No   - If Yes, were any placed within the last 2 years? Do you require any medications prior to a surgical procedure? No   - If Yes, for which procedure? N/A    - If Yes, what medications to you require? N A     Are you taking any medications that cause you to bleed more easily ("blood thinners") No    Have you ever experienced a rapid heartbeat with epinephrine? No    Have you ever been treated with "gold" (gold sodium thiomalate) therapy? No    [facility] can help with wrinkles, "laugh lines," facial volume loss, "double chin," "love handles," age spots, and more  Are you interested in learning today about some of the skin enhancement procedures that we offer? (If Yes, please provide more detail) No    Review of Systems:  Have you recently had or currently have any of the following?     · Fever or chills: No  · Night Sweats: No  · Headaches: No  · Weight Gain: No  · Weight Loss: No  · Blurry Vision: No  · Nausea: No  · Vomiting: No  · Diarrhea: No  · Blood in Stool: No  · Abdominal Pain: No  · Itchy Skin: YES  · Painful Joints: YES  · Swollen Joints: No  · Muscle Pain: No  · Irregular Mole: No  · Sun Burn: No  · Dry Skin: No  · Skin Color Changes: No  · Scar or Keloid: No  · Cold Sores/Fever Blisters: No  · Bacterial Infections/MRSA: No  · Anxiety: No  · Depression: No  · Suicidal or Homicidal Thoughts: No      PSYCH: Normal mood and affect  EYES: Normal conjunctiva  ENT: Normal lips and oral mucosa  CARDIOVASCULAR: No edema  RESPIRATORY: Normal respirations  HEME/LYMPH/IMMUNO:  No regional lymphadenopathy except as noted below in ASSESSMENT AND PLAN BY DIAGNOSIS    FULL ORGAN SYSTEM SKIN EXAM (SKIN)    Face Normal except as noted below in Assessment   Neck, Cervical Chain Nodes    Right Arm/Hand/Fingers    Left Arm/Hand/Fingers    Chest/Breasts/Axillae    Abdomen, Umbilicus Normal except as noted below in Assessment   Back/Spine    Groin/Genitalia/Buttocks    Right Leg, Foot, Toes    Left Leg, Foot, Toes        URTICARIA ("ACUTE")    Physical Exam:   Anatomic Location Affected:  Photo of inner arm with urticaria    Morphological Description:  Currently clear    Pertinent Positives:   Pertinent Negatives: Additional History of Present Condition:  Patient states she has been very itchy lately  Assessment and Plan:  Based on a thorough discussion of this condition and the management approach to it (including a comprehensive discussion of the known risks, side effects and potential benefits of treatment), the patient (family) agrees to implement the following specific plan:   OTC zyrtec 10 mg daily, let Dr Vanesa Phan know if it not helping  What is urticaria? Urticaria is characterised by weals (hives) or angioedema (swellings, in 10%) or both (in 40%)  There are several types of urticaria  The name urticaria is derived from the common [place] stinging nettle 'Urtica dioica'  A weal (or wheal) is a superficial skin-coloured or pale skin swelling, usually surrounded by erythema (redness) that lasts anything from a few minutes to 24 hours  Usually very itchy, it may have a burning sensation  Angioedema is deeper swelling within the skin or mucous membranes and can be skin-coloured or red  It resolves within 72 hours  Angioedema may be itchy or painful but is often asymptomatic  What is acute urticaria?   Acute urticaria is urticaria, with or without angioedema, that is present for less than 6 weeks  It is often gone within hours to days  Who gets acute urticaria? One in five children or adults has an episode of acute urticaria during their lifetime  It is more common in atopic individuals  It affects all races and both sexes  What are the clinical features of acute urticaria? Urticarial weals can be a few millimeters or several centimeters in diameter, colored white or red, with or without a red flare  Each weal may last a few minutes or several hours and may change shape  Weals may be round, or form rings, a map-like pattern, targetoid lesions, or giant patches  Acute urticaria can affect any site of the body and tends to be distributed widely  Angioedema is more often localised  It commonly affects the face (especially eyelids and perioral sites), hands, feet and genitalia  It may involve tongue, uvula, soft palate, larynx  Serum sickness due to blood transfusion and serum sickness-like reactions due to certain drugs cause acute urticaria leaving bruises, fever, swollen lymph glands, joint pain and swelling  What causes acute urticaria? Weals are due to release of chemical mediators from tissue mast cells and circulating basophils  These chemical mediators include histamine, platelet-activating factor and cytokines  The mediators activate sensory nerves and cause dilation of blood vessels and leakage of fluid into surrounding tissues  Bradykinin release causes angioedema  Several hypotheses have been proposed to explain urticaria  The immune, arachidonic acid and coagulation systems are involved, and genetic mutations are under investigation  Serum sickness and serum sickness-like reactions are due to immune complex deposition in affected tissues  Acute urticaria can be induced by the following factors but the cause is not always identified     Acute viral infection -- an upper respiratory infection, viral hepatitis, infectious mononucleosis, mycoplasma    Acute bacterial infection -- a dental abscess, sinusitis    Food allergy (IgE mediated) -- usually milk, egg, peanut, shellfish    Drug allergy (IgE mediated) -- often an antibiotic    Drug pseudoallergy -- aspirin, nonselective nonsteroidal anti-inflammatory drugs, opiates, radiocontrast media; these cause urticaria without immune activation    Vaccination    Bee or wasp stings     Widespread reaction following localized contact urticaria -- rubber latex  Severe allergic urticaria may lead to anaphylactic shock (bronchospasm, collapse)  A single episode or recurrent episodes of angioedema without urticaria can be due to an angiotensin-converting enzyme (ACE) inhibitor drug  How is acute urticaria diagnosed? Acute urticaria is diagnosed in people with a short history of weals that last less than 24 hours, with or without angioedema  A thorough physical examination should be undertaken to look for underlying causes  Skin prick tests and radioallergosorbent tests (RAST) or CAP fluoroimmunoassay may be requested if a drug or food allergy is suspected in acute urticaria  Biopsy of urticaria can be non-specific and difficult to interpret  The pathology shows oedema in the dermis and dilated blood vessels, with a variable mixed inflammatory infiltrate  Vessel-wall damage indicates urticarial vasculitis  What is the treatment for acute urticaria? The main treatment for acute urticaria in adults and in children is with an oral second-generation antihistamine chosen from the list below  If the standard dose (eg 10 mg for cetirizine) is not effective, the dose can be increased fourfold (eg 40 mg cetirizine daily)  They are best taken continuously rather than on demand  They are stopped when the acute urticaria has settled down  There is not thought to be any benefit from adding a second antihistamine     Cetirizine    Loratadine    Fexofenadine  Desloratadine    Levocetirizine    Rupatadine    Bilastine  Terfenadine and astemizole should not be used as they are cardiotoxic in combination with ketoconazole or erythromycin  They are no longer available in [place]  Although systemic treatment is best avoided during pregnancy and breastfeeding, there have been no reports that second-generation antihistamines cause birth defects  If treatment is required, loratadine and cetirizine are currently preferred  Conventional first-generation antihistamines such as promethazine or chlorpheniramine are no longer recommended for urticaria  Avoidance of trigger factors  In addition to antihistamines, the cause of urticaria should be eliminated if known (eg drug or food allergy)  Avoidance of relevant type 1 (IgE-mediated) allergens clears urticaria within 48 hours  In addition to antihistamines, the triggers for urticaria should be avoided where possible  For example:   Avoid aspirin, opiates and nonsteroidal anti-inflammatory drugs (paracetamol is generally safe)   Avoid known allergies that have been confirmed by positive specific IgE/skin prick tests if these have clinical relevance for urticaria   Cool the affected area with a fan, cold flannel, ice pack or soothing moisturising lotion     Treatment of refractory acute urticaria  If non-sedating antihistamines are not effective, a 4 to 5-day course of oral prednisone (prednisolone) may be warranted in severe acute urticaria, particularly if there is angioedema  Systemic steroids do not speed up the resolution of symptoms  Intramuscular injection of adrenaline (epinephrine) is reserved for life-threatening anaphylaxis or swelling of the throat  Rash- possible acne   Physical Exam:   Anatomic Location Affected: Face    Morphological Description:  Comedone and pink papule    Pertinent Positives:   Pertinent Negatives:     Additional History of Present Condition:  Patient states she has been using alcohol on face and Neutrogena clear pre     Assessment and Plan:  Based on a thorough discussion of this condition and the management approach to it (including a comprehensive discussion of the known risks, side effects and potential benefits of treatment), the patient (family) agrees to implement the following specific plan:  Start using Differin Gel Spread one pea-sized amount of medication over entire face about one hour before bedtime      Continue Neutrogena clear pore       SEBORRHEIC KERATOSIS; NON-INFLAMED    Physical Exam:   Anatomic Location Affected:  Left cheek    Morphological Description:  Flat and raised, waxy, smooth to warty textured, yellow to brownish-grey to dark brown to blackish, discrete, "stuck-on" appearing papules   Pertinent Positives:   Pertinent Negatives: Additional History of Present Condition:  Patient reports new bumps on the skin  Denies itch, burn, pain, bleeding or ulceration  Present constantly; nothing seems to make it worse or better  No prior treatment  Assessment and Plan:  Based on a thorough discussion of this condition and the management approach to it (including a comprehensive discussion of the known risks, side effects and potential benefits of treatment), the patient (family) agrees to implement the following specific plan:   Reassured benign    Cryotherapy discussed     Seborrheic Keratosis  A seborrheic keratosis is a harmless warty spot that appears during adult life as a common sign of skin aging  Seborrheic keratoses can arise on any area of skin, covered or uncovered, with the usual exception of the palms and soles  They do not arise from mucous membranes  Seborrheic keratoses can have highly variable appearance  Seborrheic keratoses are extremely common  It has been estimated that over 90% of adults over the age of 61 years have one or more of them   They occur in males and females of all races, typically beginning to erupt in the 35s or 45s  They are uncommon under the age of 21 years  The precise cause of seborrhoeic keratoses is not known  Seborrhoeic keratoses are considered degenerative in nature  As time goes by, seborrheic keratoses tend to become more numerous  Some people inherit a tendency to develop a very large number of them; some people may have hundreds of them  The name "seborrheic keratosis" is misleading, because these lesions are not limited to a seborrhoeic distribution (scalp, mid-face, chest, upper back), nor are they formed from sebaceous glands, nor are they associated with sebum -- which is greasy  Seborrheic keratosis may also be called "SK," "Seb K," "basal cell papilloma," "senile wart," or "barnacle "      Researchers have noted:   Eruptive seborrhoeic keratoses can follow sunburn or dermatitis   Skin friction may be the reason they appear in body folds   Viral cause (e g , human papillomavirus) seems unlikely   Stable and clonal mutations or activation of FRFR3, PIK3CA, GRACE, AKT1 and EGFR genes are found in seborrhoeic keratoses   Seborrhoeic keratosis can arise from solar lentigo   FRFR3 mutations also arise in solar lentigines  These mutations are associated with increased age and location on the head and neck, suggesting a role of ultraviolet radiation in these lesions   Seborrheic keratoses do not harbour tumour suppressor gene mutations   Epidermal growth factor receptor inhibitors, which are used to treat some cancers, often result in an increase in verrucal (warty) keratoses  There is no easy way to remove multiple lesions on a single occasion  Unless a specific lesion is "inflamed" and is causing pain or stinging/burning or is bleeding, most insurance companies do not authorize treatment  VARICOSE VEINS    Physical Exam:   Anatomic Location Affected:  Legs    Morphological Description:  Varicose vein    Pertinent Positives:   Pertinent Negatives:     Additional History of Present Condition:  Patient would like veins removed     Assessment and Plan:  Based on a thorough discussion of this condition and the management approach to it (including a comprehensive discussion of the known risks, side effects and potential benefits of treatment), the patient (family) agrees to implement the following specific plan:   DR Emily Chavez will be available in September can make an appt to discuss varicose veins     What are varicose veins? Varicose veins are engorged, tortuous, green, blue, or purple veins that are often found on the lower legs and feet  Varicose veins are also called varices or varicosities  Who gets varicose veins? Approximately one-third of men and women aged 18-64 years have varicose veins [1]  They are more common in women and those with a family history of venous disease  What causes varicose veins? In normal leg veins, one-way valves direct the flow of venous blood from superficial venules to larger superficial veins, then to the deep veins, and eventually to the heart  Muscle contractions create a pumping action to help the flow of blood to the heart (the venous return)  Risk factors for varicose veins   Obesity -- obesity increases venous reflux and venous pressure because of raised intra-abdominal pressure   Age -- varicose veins are increasingly common with age   Pregnancy -- the enlarged uterus causes increased intra-abdominal pressure and direct pressure on the iliac veins  Hormonal changes cause the valves and vessels to become more malleable   Prolonged standing -- increased hydrostatic pressures over time may cause venous distension and valve failure   Family history -- primary valvular failure is hereditary, and there is concordance in monozygotic twins in 75% of cases  What are the clinical features of varicose veins?   Patients with varicose veins present because they are unsightly and because of a feeling of discomfort, heaviness, itching, or a dull ache  Patients can also present with complications such as bleeding, ulceration, and thrombophlebitis  What are the complications of varicose veins?  Bleeding: superficial veins are prone to trauma and can bleed, which can be potentially life-threatening   Ulceration: the increased pressure in varicose veins allows growth factors and circulating pro-inflammatory molecules to leak into the extravascular space, which leads to localized inflammation and the formation of a chronic venous leg ulcer   Thrombophlebitis: phlebitis is inflammation of the veins with erythema and painful induration of the affected veins  It is often associated with thrombosis (thrombophlebitis)  Thrombosis arises because of sluggish circulation of blood in the vein combined with a hypercoagulable state and may be superficial (superficial thrombophlebitis) or deep (deep vein thrombosis)   Venous stasis dermatitis: associated with increased venous pressure and pooling of inflammatory molecules  Patients present with brown discoloration, pruritus, and discoid or circumferential, acute or chronic eczema on the distal lower limbs   Lipodermatosclerosis: inflammatory and indurated form of panniculitis due to the presence of pro-inflammatory molecules  How are varicose veins diagnosed? Varicose veins are diagnosed clinically  A physical examination should include the entire venous system and is usually conducted with the patient lying down and standing up    The [facility] uses the Clinical Etiological Anatomical Pathophysiological (CEAP) classification of varicose veins:  CEAP classification   C0 -- No visible or palpable signs of venous disease    C1 -- Telangiectasias or reticular veins    C2 -- Varicose veins; diameter > 3 mm    C3 -- Oedema    C4 -- Changes in skin and subcutaneous tissue: pigmentation, eczema, lipodermatosclerosis, or atrophie charles    C5 -- Healed venous ulcer    C6 -- Active venous ulcer    Duplex Doppler ultrasound assessment should be performed to determine the extent of disease and the level of truncal reflux [8] and to plan treatment options  What is the differential diagnosis for varicose veins? Varicose veins are larger than telangiectasia (small red 'thread' veins, < 1 mm in diameter) and venulectasia (blue reticular vessels, 1-3 mm in diameter)  These are not detected on duplex ultrasound  What is the treatment for varicose veins? Weight loss (if overweight) and moderate physical activity should be encouraged in patients with varicose veins to reduce the risk of complications  Compression hosiery should be used to relieve discomfort and swelling, and especially when travelling  Treatment options for varicose veins are available from a vascular service   Endovenous thermal ablation: using a laser or radiofrequency device causes an irreversible thermal injury in the vein wall, which leads to scarring and absorption of the tissue over several months  The success rate for both different methods of ablation is 95%   Injection sclerotherapy: involves the injection of a sclerosant under ultrasound guidance to cause inflammation of the vessels of the wall and eventual collapse of the varicose network  Smaller surface veins can be injected by microsclerotherapy, involving smaller gauge needles with smaller amounts of sclerosant   Endovenous adhesive ablation: 'vein glue' technique involves the injection of medical grade adhesive cyanoacrylate glue through a catheter  Long term safety and efficacy data are lacking   Endovenous mechanicochemical ablation (ClariVein): a rotating occlusion catheter that mechanically agitates the vein lining while also spraying a liquid sclerosant  It induces more endothelial damage but lacks long term data     Surgery: high ligation, vein stripping and avulsion are less often used to treat varicose veins than in the past due to postoperative morbidity, recurrence rates, and the risks associated with anaesthesia and hospitalization   Lasers: telangiectasia and venulectasia can be treated with long wavelength vascular lasers, but these are unsuitable for larger varicose veins  What is the outcome for varicose veins? Whichever treatment option is used, varicose veins may recur and can be treated again            Scribe Attestation    I,:  Liliana Jeffries MA am acting as a scribe while in the presence of the attending physician :       I,:  Katie Salazar MD personally performed the services described in this documentation    as scribed in my presence :

## 2022-06-20 NOTE — PATIENT INSTRUCTIONS
Assessment and Plan:  Based on a thorough discussion of this condition and the management approach to it (including a comprehensive discussion of the known risks, side effects and potential benefits of treatment), the patient (family) agrees to implement the following specific plan:  OTC zyrtec 10 mg daily     What is urticaria? Urticaria is characterised by weals (hives) or angioedema (swellings, in 10%) or both (in 40%)  There are several types of urticaria  The name urticaria is derived from the common [place] stinging nettle 'Urtica dioica'  A weal (or wheal) is a superficial skin-coloured or pale skin swelling, usually surrounded by erythema (redness) that lasts anything from a few minutes to 24 hours  Usually very itchy, it may have a burning sensation  Angioedema is deeper swelling within the skin or mucous membranes and can be skin-coloured or red  It resolves within 72 hours  Angioedema may be itchy or painful but is often asymptomatic  What is acute urticaria? Acute urticaria is urticaria, with or without angioedema, that is present for less than 6 weeks  It is often gone within hours to days  Who gets acute urticaria? One in five children or adults has an episode of acute urticaria during their lifetime  It is more common in atopic individuals  It affects all races and both sexes  What are the clinical features of acute urticaria? Urticarial weals can be a few millimeters or several centimeters in diameter, colored white or red, with or without a red flare  Each weal may last a few minutes or several hours and may change shape  Weals may be round, or form rings, a map-like pattern, targetoid lesions, or giant patches  Acute urticaria can affect any site of the body and tends to be distributed widely  Angioedema is more often localised  It commonly affects the face (especially eyelids and perioral sites), hands, feet and genitalia   It may involve tongue, uvula, soft palate, larynx  Serum sickness due to blood transfusion and serum sickness-like reactions due to certain drugs cause acute urticaria leaving bruises, fever, swollen lymph glands, joint pain and swelling  What causes acute urticaria? Weals are due to release of chemical mediators from tissue mast cells and circulating basophils  These chemical mediators include histamine, platelet-activating factor and cytokines  The mediators activate sensory nerves and cause dilation of blood vessels and leakage of fluid into surrounding tissues  Bradykinin release causes angioedema  Several hypotheses have been proposed to explain urticaria  The immune, arachidonic acid and coagulation systems are involved, and genetic mutations are under investigation  Serum sickness and serum sickness-like reactions are due to immune complex deposition in affected tissues  Acute urticaria can be induced by the following factors but the cause is not always identified  Acute viral infection -- an upper respiratory infection, viral hepatitis, infectious mononucleosis, mycoplasma   Acute bacterial infection -- a dental abscess, sinusitis   Food allergy (IgE mediated) -- usually milk, egg, peanut, shellfish   Drug allergy (IgE mediated) -- often an antibiotic   Drug pseudoallergy -- aspirin, nonselective nonsteroidal anti-inflammatory drugs, opiates, radiocontrast media; these cause urticaria without immune activation   Vaccination   Bee or wasp stings     Widespread reaction following localized contact urticaria -- rubber latex  Severe allergic urticaria may lead to anaphylactic shock (bronchospasm, collapse)  A single episode or recurrent episodes of angioedema without urticaria can be due to an angiotensin-converting enzyme (ACE) inhibitor drug  How is acute urticaria diagnosed? Acute urticaria is diagnosed in people with a short history of weals that last less than 24 hours, with or without angioedema   A thorough physical examination should be undertaken to look for underlying causes  Skin prick tests and radioallergosorbent tests (RAST) or CAP fluoroimmunoassay may be requested if a drug or food allergy is suspected in acute urticaria  Biopsy of urticaria can be non-specific and difficult to interpret  The pathology shows oedema in the dermis and dilated blood vessels, with a variable mixed inflammatory infiltrate  Vessel-wall damage indicates urticarial vasculitis  What is the treatment for acute urticaria? The main treatment for acute urticaria in adults and in children is with an oral second-generation antihistamine chosen from the list below  If the standard dose (eg 10 mg for cetirizine) is not effective, the dose can be increased fourfold (eg 40 mg cetirizine daily)  They are best taken continuously rather than on demand  They are stopped when the acute urticaria has settled down  There is not thought to be any benefit from adding a second antihistamine  Cetirizine   Loratadine   Fexofenadine   Desloratadine   Levocetirizine   Rupatadine   Bilastine  Terfenadine and astemizole should not be used as they are cardiotoxic in combination with ketoconazole or erythromycin  They are no longer available in [place]  Although systemic treatment is best avoided during pregnancy and breastfeeding, there have been no reports that second-generation antihistamines cause birth defects  If treatment is required, loratadine and cetirizine are currently preferred  Conventional first-generation antihistamines such as promethazine or chlorpheniramine are no longer recommended for urticaria  Avoidance of trigger factors  In addition to antihistamines, the cause of urticaria should be eliminated if known (eg drug or food allergy)  Avoidance of relevant type 1 (IgE-mediated) allergens clears urticaria within 48 hours  In addition to antihistamines, the triggers for urticaria should be avoided where possible   For example:  Avoid aspirin, opiates and nonsteroidal anti-inflammatory drugs (paracetamol is generally safe)  Avoid known allergies that have been confirmed by positive specific IgE/skin prick tests if these have clinical relevance for urticaria  Cool the affected area with a fan, cold flannel, ice pack or soothing moisturising lotion  Treatment of refractory acute urticaria  If non-sedating antihistamines are not effective, a 4 to 5-day course of oral prednisone (prednisolone) may be warranted in severe acute urticaria, particularly if there is angioedema  Systemic steroids do not speed up the resolution of symptoms  Intramuscular injection of adrenaline (epinephrine) is reserved for life-threatening anaphylaxis or swelling of the throat  Assessment and Plan:  Based on a thorough discussion of this condition and the management approach to it (including a comprehensive discussion of the known risks, side effects and potential benefits of treatment), the patient (family) agrees to implement the following specific plan:  Start using Differin Gel Spread one pea-sized amount of medication over entire face about one hour before bedtime    Continue Neutrogena clear pore     Assessment and Plan:  Based on a thorough discussion of this condition and the management approach to it (including a comprehensive discussion of the known risks, side effects and potential benefits of treatment), the patient (family) agrees to implement the following specific plan:  Reassured benign   Cryotherapy discussed     Seborrheic Keratosis  A seborrheic keratosis is a harmless warty spot that appears during adult life as a common sign of skin aging  Seborrheic keratoses can arise on any area of skin, covered or uncovered, with the usual exception of the palms and soles  They do not arise from mucous membranes  Seborrheic keratoses can have highly variable appearance  Seborrheic keratoses are extremely common   It has been estimated that over 90% of adults over the age of 61 years have one or more of them  They occur in males and females of all races, typically beginning to erupt in the 35s or 45s  They are uncommon under the age of 21 years  The precise cause of seborrhoeic keratoses is not known  Seborrhoeic keratoses are considered degenerative in nature  As time goes by, seborrheic keratoses tend to become more numerous  Some people inherit a tendency to develop a very large number of them; some people may have hundreds of them  The name "seborrheic keratosis" is misleading, because these lesions are not limited to a seborrhoeic distribution (scalp, mid-face, chest, upper back), nor are they formed from sebaceous glands, nor are they associated with sebum -- which is greasy  Seborrheic keratosis may also be called "SK," "Seb K," "basal cell papilloma," "senile wart," or "barnacle "      Researchers have noted:  Eruptive seborrhoeic keratoses can follow sunburn or dermatitis  Skin friction may be the reason they appear in body folds  Viral cause (e g , human papillomavirus) seems unlikely  Stable and clonal mutations or activation of FRFR3, PIK3CA, GRACE, AKT1 and EGFR genes are found in seborrhoeic keratoses  Seborrhoeic keratosis can arise from solar lentigo  FRFR3 mutations also arise in solar lentigines  These mutations are associated with increased age and location on the head and neck, suggesting a role of ultraviolet radiation in these lesions  Seborrheic keratoses do not harbour tumour suppressor gene mutations  Epidermal growth factor receptor inhibitors, which are used to treat some cancers, often result in an increase in verrucal (warty) keratoses  There is no easy way to remove multiple lesions on a single occasion  Unless a specific lesion is "inflamed" and is causing pain or stinging/burning or is bleeding, most insurance companies do not authorize treatment      Assessment and Plan:  Based on a thorough discussion of this condition and the management approach to it (including a comprehensive discussion of the known risks, side effects and potential benefits of treatment), the patient (family) agrees to implement the following specific plan:  DR Gabriela Ghosh will be available in September can make an appt to discuss varicose veins     What are varicose veins? Varicose veins are engorged, tortuous, green, blue, or purple veins that are often found on the lower legs and feet  Varicose veins are also called varices or varicosities  Who gets varicose veins? Approximately one-third of men and women aged 18-64 years have varicose veins [1]  They are more common in women and those with a family history of venous disease  What causes varicose veins? In normal leg veins, one-way valves direct the flow of venous blood from superficial venules to larger superficial veins, then to the deep veins, and eventually to the heart  Muscle contractions create a pumping action to help the flow of blood to the heart (the venous return)  Risk factors for varicose veins  Obesity -- obesity increases venous reflux and venous pressure because of raised intra-abdominal pressure  Age -- varicose veins are increasingly common with age  Pregnancy -- the enlarged uterus causes increased intra-abdominal pressure and direct pressure on the iliac veins  Hormonal changes cause the valves and vessels to become more malleable  Prolonged standing -- increased hydrostatic pressures over time may cause venous distension and valve failure  Family history -- primary valvular failure is hereditary, and there is concordance in monozygotic twins in 75% of cases  What are the clinical features of varicose veins? Patients with varicose veins present because they are unsightly and because of a feeling of discomfort, heaviness, itching, or a dull ache  Patients can also present with complications such as bleeding, ulceration, and thrombophlebitis      What are the complications of varicose veins? Bleeding: superficial veins are prone to trauma and can bleed, which can be potentially life-threatening  Ulceration: the increased pressure in varicose veins allows growth factors and circulating pro-inflammatory molecules to leak into the extravascular space, which leads to localized inflammation and the formation of a chronic venous leg ulcer  Thrombophlebitis: phlebitis is inflammation of the veins with erythema and painful induration of the affected veins  It is often associated with thrombosis (thrombophlebitis)  Thrombosis arises because of sluggish circulation of blood in the vein combined with a hypercoagulable state and may be superficial (superficial thrombophlebitis) or deep (deep vein thrombosis)  Venous stasis dermatitis: associated with increased venous pressure and pooling of inflammatory molecules  Patients present with brown discoloration, pruritus, and discoid or circumferential, acute or chronic eczema on the distal lower limbs  Lipodermatosclerosis: inflammatory and indurated form of panniculitis due to the presence of pro-inflammatory molecules  How are varicose veins diagnosed? Varicose veins are diagnosed clinically  A physical examination should include the entire venous system and is usually conducted with the patient lying down and standing up    The [facility] uses the Clinical Etiological Anatomical Pathophysiological (CEAP) classification of varicose veins:  CEAP classification  C0 -- No visible or palpable signs of venous disease   C1 -- Telangiectasias or reticular veins   C2 -- Varicose veins; diameter > 3 mm   C3 -- Oedema   C4 -- Changes in skin and subcutaneous tissue: pigmentation, eczema, lipodermatosclerosis, or atrophie charles   C5 -- Healed venous ulcer   C6 -- Active venous ulcer    Duplex Doppler ultrasound assessment should be performed to determine the extent of disease and the level of truncal reflux [8] and to plan treatment options  What is the differential diagnosis for varicose veins? Varicose veins are larger than telangiectasia (small red 'thread' veins, < 1 mm in diameter) and venulectasia (blue reticular vessels, 1-3 mm in diameter)  These are not detected on duplex ultrasound  What is the treatment for varicose veins? Weight loss (if overweight) and moderate physical activity should be encouraged in patients with varicose veins to reduce the risk of complications  Compression hosiery should be used to relieve discomfort and swelling, and especially when travelling  Treatment options for varicose veins are available from a vascular service  Endovenous thermal ablation: using a laser or radiofrequency device causes an irreversible thermal injury in the vein wall, which leads to scarring and absorption of the tissue over several months  The success rate for both different methods of ablation is 95%  Injection sclerotherapy: involves the injection of a sclerosant under ultrasound guidance to cause inflammation of the vessels of the wall and eventual collapse of the varicose network  Smaller surface veins can be injected by microsclerotherapy, involving smaller gauge needles with smaller amounts of sclerosant  Endovenous adhesive ablation: 'vein glue' technique involves the injection of medical grade adhesive cyanoacrylate glue through a catheter  Long term safety and efficacy data are lacking  Endovenous mechanicochemical ablation (ClariVein): a rotating occlusion catheter that mechanically agitates the vein lining while also spraying a liquid sclerosant  It induces more endothelial damage but lacks long term data  Surgery: high ligation, vein stripping and avulsion are less often used to treat varicose veins than in the past due to postoperative morbidity, recurrence rates, and the risks associated with anaesthesia and hospitalization    Lasers: telangiectasia and venulectasia can be treated with long wavelength vascular lasers, but these are unsuitable for larger varicose veins  What is the outcome for varicose veins? Whichever treatment option is used, varicose veins may recur and can be treated again

## 2022-06-27 ENCOUNTER — APPOINTMENT (OUTPATIENT)
Dept: LAB | Facility: CLINIC | Age: 57
End: 2022-06-27
Payer: COMMERCIAL

## 2022-06-27 ENCOUNTER — OFFICE VISIT (OUTPATIENT)
Dept: INTERNAL MEDICINE CLINIC | Facility: CLINIC | Age: 57
End: 2022-06-27
Payer: COMMERCIAL

## 2022-06-27 VITALS
OXYGEN SATURATION: 98 % | HEIGHT: 64 IN | WEIGHT: 122 LBS | DIASTOLIC BLOOD PRESSURE: 80 MMHG | RESPIRATION RATE: 20 BRPM | HEART RATE: 82 BPM | BODY MASS INDEX: 20.83 KG/M2 | SYSTOLIC BLOOD PRESSURE: 124 MMHG | TEMPERATURE: 98.6 F

## 2022-06-27 DIAGNOSIS — E78.2 MIXED HYPERLIPIDEMIA: ICD-10-CM

## 2022-06-27 DIAGNOSIS — Z11.59 NEED FOR HEPATITIS C SCREENING TEST: ICD-10-CM

## 2022-06-27 DIAGNOSIS — D17.71 ANGIOMYOLIPOMA OF RIGHT KIDNEY: Chronic | ICD-10-CM

## 2022-06-27 DIAGNOSIS — C49.9 LIPOSARCOMA (HCC): Primary | ICD-10-CM

## 2022-06-27 DIAGNOSIS — M85.80 OSTEOPENIA WITH HIGH RISK OF FRACTURE: Chronic | ICD-10-CM

## 2022-06-27 DIAGNOSIS — R91.1 LUNG NODULE: ICD-10-CM

## 2022-06-27 PROBLEM — J30.0 VASOMOTOR RHINITIS: Status: RESOLVED | Noted: 2021-02-05 | Resolved: 2022-06-27

## 2022-06-27 PROBLEM — R73.9 HYPERGLYCEMIA: Status: RESOLVED | Noted: 2021-02-04 | Resolved: 2022-06-27

## 2022-06-27 PROBLEM — R05.9 COUGH: Status: RESOLVED | Noted: 2021-10-13 | Resolved: 2022-06-27

## 2022-06-27 PROBLEM — I78.1 TELANGIECTASIAS: Status: RESOLVED | Noted: 2021-02-04 | Resolved: 2022-06-27

## 2022-06-27 PROBLEM — R05.3 CHRONIC COUGH: Status: RESOLVED | Noted: 2019-11-11 | Resolved: 2022-06-27

## 2022-06-27 PROCEDURE — 80061 LIPID PANEL: CPT

## 2022-06-27 PROCEDURE — 36415 COLL VENOUS BLD VENIPUNCTURE: CPT

## 2022-06-27 PROCEDURE — 3725F SCREEN DEPRESSION PERFORMED: CPT | Performed by: INTERNAL MEDICINE

## 2022-06-27 PROCEDURE — 80053 COMPREHEN METABOLIC PANEL: CPT

## 2022-06-27 PROCEDURE — 1036F TOBACCO NON-USER: CPT | Performed by: INTERNAL MEDICINE

## 2022-06-27 PROCEDURE — 86803 HEPATITIS C AB TEST: CPT

## 2022-06-27 PROCEDURE — 3008F BODY MASS INDEX DOCD: CPT | Performed by: INTERNAL MEDICINE

## 2022-06-27 PROCEDURE — 99204 OFFICE O/P NEW MOD 45 MIN: CPT | Performed by: INTERNAL MEDICINE

## 2022-06-27 RX ORDER — FEXOFENADINE HYDROCHLORIDE 60 MG/1
60 TABLET, FILM COATED ORAL DAILY
COMMUNITY
End: 2022-06-27 | Stop reason: CLARIF

## 2022-06-27 NOTE — PROGRESS NOTES
INTERNAL MEDICINE INITIAL OFFICE VISIT  [facility]    NAME: Catalina Meléndez  AGE: 61 y o  SEX: female  : 1965     DATE: 2022     Assessment and Plan:     1  Liposarcoma ([address] )    S/p resection  Follows with surgical oncology  Pathology showed low grade liposarcoma  2  Angiomyolipoma of right kidney    Right renal mass s/p partial nephrectomy  F/u with urology prn     3  Mixed hyperlipidemia    Check lipid panel  No history of ASCVD  - Comprehensive metabolic panel; Future  - Lipid Panel with Direct LDL reflex; Future    4  Lung nodule    Monitor CT chest  Follows with pulmonary  5  Osteopenia with high risk of fracture    Has been on alendronate for years then took a drug holiday  Has family history of osteoporosis  She is back on alendronate now  She is on her 2nd dose for current prescription  6  Need for hepatitis C screening test  - Hepatitis C antibody; Future      Depression Screening and Follow-up Plan: Patient was screened for depression during today's encounter  They screened negative with a PHQ-2 score of 0  Return in about 1 year (around 2023) for Annual Physical      Chief Complaint:     Chief Complaint   Patient presents with    [facility]    Rash     Face wash / they told her it is acne / was on medication and state it is not helping       History of Present Illness:     Julian Haro presents to [facility]  She has a 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  She underwent excision of this mass on 2021 by Dr Aki Mccarthy  Pathology sent for second opinion and came back showing an atypical lipomatous neoplasm/low grade liposarcoma  She was seen by medical oncology and CT chest showed evidence of benign appearing lung nodule  That is currently being monitored on CT chest     She was also found to have a right renal mass  She underwent partial nephrectomy by Dr Gregoria Anna   Pathology came back consistent with angiomyolipoma  Having issues with urticaria/allergies  Has mild rash on her face  Saw dermatology and was recommended to use some creams  Doesn't feel like her face is getting better  She might get a second opinion  Never had issues with her face before so it upsets her  Cholesterol has been elevated  No history of ASCVD  The following portions of the patient's history were reviewed and updated as appropriate: allergies, current medications, past family history, past medical history, past social history, past surgical history and problem list      Review of Systems:     Review of Systems   Constitutional: Negative for appetite change, chills, fatigue and fever  HENT: Negative for congestion, hearing loss, postnasal drip, rhinorrhea, sore throat, tinnitus and trouble swallowing  Eyes: Negative for pain, discharge, redness and visual disturbance  Respiratory: Negative for cough, chest tightness, shortness of breath and wheezing  Cardiovascular: Negative for chest pain, palpitations and leg swelling  Gastrointestinal: Negative for abdominal distention, abdominal pain, blood in stool, constipation, diarrhea, nausea and vomiting  Endocrine: Negative for cold intolerance, heat intolerance, polydipsia, polyphagia and polyuria  Genitourinary: Negative for difficulty urinating, dysuria, frequency, hematuria and urgency  Musculoskeletal: Negative for arthralgias, back pain, gait problem, joint swelling, myalgias, neck pain and neck stiffness  Skin: Positive for rash (face)  Negative for color change  Neurological: Negative for dizziness, tremors, seizures, syncope, speech difficulty, weakness, light-headedness, numbness and headaches  Hematological: Negative for adenopathy  Does not bruise/bleed easily  Psychiatric/Behavioral: Negative for agitation, behavioral problems, confusion, hallucinations, sleep disturbance and suicidal ideas   The patient is nervous/anxious         Past Medical History:     Past Medical History:   Diagnosis Date    Cancer ([address] )     4/15/22 Pt reports right kidney cancer     GERD (gastroesophageal reflux disease)     4/15/22 Pt reports on occasion - takes no medications -reports using TUMS as needed    History of HPV infection     Hyperlipidemia     4/15/22 Pt reports cholesterol is borderline - on no medications at this time    Renal lesion     Seasonal allergies     4/15/22 Pt reports has shortness of breath only with seasonal allergies "tightness with breathing reported with allergies "    Telangiectasias 2/4/2021       Past Surgical History:     Past Surgical History:   Procedure Laterality Date    CERVIX SURGERY      Cervical cyst removed / also for HPV    COLONOSCOPY      DILATION AND CURETTAGE OF UTERUS      EGD      KIDNEY SURGERY Right     ME LAP,PARTIAL NEPHRECTOMY Right 05/02/2022    Procedure: ROBOTIC LAPAROSCOPIC PARTIAL NEPHRECTOMY, INTRAOPERATIVE ULTRASOUND WITH INTERPRETATION;  Surgeon: Alexey Valdes MD;  Location: [facility];  Service: Urology    SOFT TISSUE MASS EXCISION      mass removed from back   [address] [place] [address]      4/15/22 Pt reports had fibroid removed - indicated cervical area       Social History:     Social History     Socioeconomic History    Marital status: /Civil Union     Spouse name: None    Number of children: None    Years of education: None    Highest education level: None   Occupational History    None   Tobacco Use    Smoking status: Never Smoker    Smokeless tobacco: Never Used    Tobacco comment: Denies any former or current use    Vaping Use    Vaping Use: Never used   Substance and Sexual Activity    Alcohol use: Never     Comment: Denies any former or current use of alcohol     Drug use: Never     Comment: Denies any former or current drug use     Sexual activity: Not Currently     Comment: Reports not active at this time   Other Topics Concern  None   Social History Narrative    None     Social Determinants of Health     Financial Resource Strain: Not on file   Food Insecurity: Not on file   Transportation Needs: Not on file   Physical Activity: Not on file   Stress: Not on file   Social Connections: Not on file   Intimate Partner Violence: Not on file   Housing Stability: Not on file       Family History:     Family History   Problem Relation Age of Onset    No Known Problems Mother     Alzheimer's disease Father     No Known Problems Sister     No Known Problems Daughter       Current Medications:     Current Outpatient Medications:     acetaminophen (TYLENOL) 325 mg tablet, Take 2 tablets (650 mg total) by mouth every 4 (four) hours as needed for mild pain, Disp: 30 tablet, Rfl: 0    alendronate (FOSAMAX) 70 mg tablet, TAKE 1 TABLET BY MOUTH ONE TIME PER WEEK, Disp: , Rfl:     Cetirizine HCl 10 MG CAPS, Take 10 mg by mouth if needed, Disp: , Rfl:     diclofenac sodium (VOLTAREN) 50 mg EC tablet, Take 1 tablet (50 mg total) by mouth 3 (three) times a day for 7 days, Disp: 21 tablet, Rfl: 0    docusate sodium (COLACE) 100 mg capsule, Take 1 capsule (100 mg total) by mouth 2 (two) times a day for 15 days, Disp: 30 capsule, Rfl: 0    fexofenadine (ALLEGRA) 60 MG tablet, Take 60 mg by mouth daily, Disp: , Rfl:     fluticasone (VERAMYST) 27 5 MCG/SPRAY nasal spray, 2 sprays into each nostril as needed, Disp: , Rfl:     latanoprost (XALATAN) 0 005 % ophthalmic solution, Administer 1 drop to both eyes daily at bedtime  , Disp: , Rfl:     multivitamin (THERAGRAN) TABS, Take 1 tablet by mouth daily, Disp: , Rfl:      Allergies:      Allergies   Allergen Reactions    Other Sneezing     Cats, dogs, seasonal - pt reports hard to breath and coughing with these allergies       Physical Exam:     /80 (BP Location: Left arm, Patient Position: Sitting, Cuff Size: Standard)   Pulse 82   Temp 98 6 °F (37 °C) (Tympanic)   Resp 20   Ht 5' 4" (1 626 m)   Wt 55 3 kg (122 lb)   SpO2 98%   BMI 20 94 kg/m²     Physical Exam  Vitals reviewed  Constitutional:       General: She is not in acute distress  Appearance: She is well-developed  She is not diaphoretic  Eyes:      General: No scleral icterus  Right eye: No discharge  Left eye: No discharge  Conjunctiva/sclera: Conjunctivae normal    Neck:      Thyroid: No thyromegaly  Vascular: No JVD  Cardiovascular:      Rate and Rhythm: Normal rate and regular rhythm  Heart sounds: Normal heart sounds  No murmur heard  Pulmonary:      Effort: Pulmonary effort is normal  No respiratory distress  Breath sounds: Normal breath sounds  No wheezing or rales  Abdominal:      General: Bowel sounds are normal  There is no distension  Palpations: Abdomen is soft  Tenderness: There is no abdominal tenderness  Musculoskeletal:      Cervical back: Neck supple  Right lower leg: No edema  Left lower leg: No edema  Lymphadenopathy:      Cervical: No cervical adenopathy  Skin:     Findings: Rash (face) present  Neurological:      Mental Status: She is alert     Psychiatric:         Mood and Affect: Mood normal          Behavior: Behavior normal         Giovanna Shin DO  MEDICAL [id number]

## 2022-06-28 LAB
ALBUMIN SERPL BCP-MCNC: 3.5 G/DL (ref 3.5–5)
ALP SERPL-CCNC: 83 U/L (ref 46–116)
ALT SERPL W P-5'-P-CCNC: 27 U/L (ref 12–78)
ANION GAP SERPL CALCULATED.3IONS-SCNC: 4 MMOL/L (ref 4–13)
AST SERPL W P-5'-P-CCNC: 19 U/L (ref 5–45)
BILIRUB SERPL-MCNC: 0.52 MG/DL (ref 0.2–1)
BUN SERPL-MCNC: 17 MG/DL (ref 5–25)
CALCIUM SERPL-MCNC: 9.4 MG/DL (ref 8.3–10.1)
CHLORIDE SERPL-SCNC: 110 MMOL/L (ref 100–108)
CHOLEST SERPL-MCNC: 222 MG/DL
CO2 SERPL-SCNC: 28 MMOL/L (ref 21–32)
CREAT SERPL-MCNC: 0.64 MG/DL (ref 0.6–1.3)
GFR SERPL CREATININE-BSD FRML MDRD: 98 ML/MIN/1.73SQ M
GLUCOSE SERPL-MCNC: 79 MG/DL (ref 65–140)
HCV AB SER QL: NORMAL
HDLC SERPL-MCNC: 42 MG/DL
LDLC SERPL CALC-MCNC: 135 MG/DL (ref 0–100)
POTASSIUM SERPL-SCNC: 3.8 MMOL/L (ref 3.5–5.3)
PROT SERPL-MCNC: 7.4 G/DL (ref 6.4–8.2)
SODIUM SERPL-SCNC: 142 MMOL/L (ref 136–145)
TRIGL SERPL-MCNC: 223 MG/DL

## 2022-08-09 ENCOUNTER — TELEPHONE (OUTPATIENT)
Dept: UROLOGY | Facility: CLINIC | Age: 57
End: 2022-08-09

## 2022-08-09 DIAGNOSIS — D17.71 ANGIOMYOLIPOMA OF RIGHT KIDNEY: Primary | ICD-10-CM

## 2022-08-09 NOTE — TELEPHONE ENCOUNTER
Called and Spoke to PT about labs and Imaging done prior to visit  Pt expressed her insurance will not cover the MRI and CT  Pt verbalized understanding and will get labs done today 8/9/22  Pt would also like to know if the MRI and CT is necessary to have and will talk to [employer] on at her appointment on 8/11/22

## 2022-08-10 NOTE — TELEPHONE ENCOUNTER
Called is spoke to patient  Informed patient blood work is nonfasting and will need to get blood work done one week before imaging  Informed patient I will route to our prior authorization team to work on getting CT and MRI approved  Patient will wait for our call to reschedule follow-up appointment after imaging is obtained

## 2022-08-10 NOTE — TELEPHONE ENCOUNTER
Patient called and stated that the insurance did not approved the imagining and she isn't sure which imaging was denied  Patient also wants to know if she needs to fast for the lab work       Patient can be reached at [phone]

## 2022-08-11 NOTE — TELEPHONE ENCOUNTER
Called and spoke to patient  Patient stated call center request that patient have BUN and creatinine in chart  At this time I have placed an order for BMP  Patient knows to go one week prior to imaging  No further  Questions

## 2022-08-11 NOTE — TELEPHONE ENCOUNTER
Patient called stating she scheduled ct and mri for October and she wants to know when is she supposed to do the lab bun/creatine and an order has to be put into the system      Patient can be reached at [phone]

## 2022-08-11 NOTE — TELEPHONE ENCOUNTER
I called pt and advised that testing will have to be scheduled before getting prior authorization  Gave pt CS phone number and advised pt once scheduled they will come to my work que and I will get Sebastian Vaughan started

## 2022-09-02 NOTE — TELEPHONE ENCOUNTER
Called and spoke to patient  Informed patient authorization will be done on 10/01/2022 per Micki's note from 2 weeks ago  Patient will be notified once this is finalized  Patient verbalized understanding

## 2022-09-02 NOTE — TELEPHONE ENCOUNTER
Pt called to discuss prior auth for CT/MRI  Informed patient that both tests are scheduled for 10/7/22  She would like to speak to a staff member regarding the Jaqui Elliott  Please callback an advise

## 2022-09-29 ENCOUNTER — APPOINTMENT (OUTPATIENT)
Dept: LAB | Facility: HOSPITAL | Age: 57
End: 2022-09-29
Payer: COMMERCIAL

## 2022-09-29 DIAGNOSIS — D17.71 ANGIOMYOLIPOMA OF RIGHT KIDNEY: ICD-10-CM

## 2022-09-29 LAB
ANION GAP SERPL CALCULATED.3IONS-SCNC: 5 MMOL/L (ref 4–13)
BUN SERPL-MCNC: 16 MG/DL (ref 5–25)
CALCIUM SERPL-MCNC: 9.6 MG/DL (ref 8.3–10.1)
CHLORIDE SERPL-SCNC: 107 MMOL/L (ref 96–108)
CO2 SERPL-SCNC: 31 MMOL/L (ref 21–32)
CREAT SERPL-MCNC: 0.67 MG/DL (ref 0.6–1.3)
GFR SERPL CREATININE-BSD FRML MDRD: 97 ML/MIN/1.73SQ M
GLUCOSE SERPL-MCNC: 89 MG/DL (ref 65–140)
POTASSIUM SERPL-SCNC: 3.8 MMOL/L (ref 3.5–5.3)
SODIUM SERPL-SCNC: 143 MMOL/L (ref 135–147)

## 2022-09-29 PROCEDURE — 80048 BASIC METABOLIC PNL TOTAL CA: CPT

## 2022-09-29 PROCEDURE — 36415 COLL VENOUS BLD VENIPUNCTURE: CPT

## 2022-09-30 ENCOUNTER — TELEPHONE (OUTPATIENT)
Dept: OTHER | Facility: OTHER | Age: 57
End: 2022-09-30

## 2022-09-30 NOTE — TELEPHONE ENCOUNTER
Pt would like to know if her CT is still pending approval  Would like to know how long it takes and if it is something that she needs to discuss with her insurance  Wants to know if this is something that takes as long as a day before for approval  Please reach out to pt to clarify

## 2022-10-03 NOTE — TELEPHONE ENCOUNTER
Pt stopped by [address] office to find out if she got approval for the CT and MRI     Spoke to Belle Page from [address] [place] [address] request to Jonatan p [phone]  Please get back to pt ASAP at   [phone]

## 2022-10-03 NOTE — TELEPHONE ENCOUNTER
Received call from [facility] together with patient in a conference call  Aspen Alvarado was requesting CPT code [facility]  , to verify if procedure requires prior auth  Aspen Alvarado provided phone number to THE [facility] [place] Prior Auth Dept  For office to provide information  Matthew Ph: [phone]

## 2022-10-06 NOTE — TELEPHONE ENCOUNTER
Advised pt MRI was authorized but the CT was not  Advised pt to please still go to MRI and we will r/s CT if we can get authorized  Pt aware and ok

## 2022-10-07 ENCOUNTER — HOSPITAL ENCOUNTER (OUTPATIENT)
Dept: CT IMAGING | Facility: HOSPITAL | Age: 57
End: 2022-10-07
Attending: UROLOGY
Payer: COMMERCIAL

## 2022-10-07 ENCOUNTER — HOSPITAL ENCOUNTER (OUTPATIENT)
Dept: MRI IMAGING | Facility: HOSPITAL | Age: 57
End: 2022-10-07
Attending: UROLOGY
Payer: COMMERCIAL

## 2022-10-07 DIAGNOSIS — D17.71 ANGIOMYOLIPOMA OF RIGHT KIDNEY: ICD-10-CM

## 2022-10-07 PROCEDURE — A9585 GADOBUTROL INJECTION: HCPCS | Performed by: UROLOGY

## 2022-10-07 PROCEDURE — G1004 CDSM NDSC: HCPCS

## 2022-10-07 PROCEDURE — 74183 MRI ABD W/O CNTR FLWD CNTR: CPT

## 2022-10-07 RX ADMIN — GADOBUTROL 5 ML: 604.72 INJECTION INTRAVENOUS at 13:56

## 2023-01-13 DIAGNOSIS — M85.80 OSTEOPENIA WITH HIGH RISK OF FRACTURE: Primary | ICD-10-CM

## 2023-01-15 RX ORDER — ALENDRONATE SODIUM 70 MG/1
70 TABLET ORAL
Qty: 12 TABLET | Refills: 3 | Status: SHIPPED | OUTPATIENT
Start: 2023-01-15 | End: 2024-01-10

## 2023-06-29 ENCOUNTER — TELEPHONE (OUTPATIENT)
Dept: HEMATOLOGY ONCOLOGY | Facility: CLINIC | Age: 58
End: 2023-06-29

## 2023-06-29 ENCOUNTER — OFFICE VISIT (OUTPATIENT)
Age: 58
End: 2023-06-29
Payer: COMMERCIAL

## 2023-06-29 VITALS
OXYGEN SATURATION: 99 % | HEART RATE: 74 BPM | WEIGHT: 120 LBS | SYSTOLIC BLOOD PRESSURE: 100 MMHG | DIASTOLIC BLOOD PRESSURE: 60 MMHG | BODY MASS INDEX: 20.49 KG/M2 | HEIGHT: 64 IN | TEMPERATURE: 97.9 F

## 2023-06-29 DIAGNOSIS — R05.3 CHRONIC COUGH: ICD-10-CM

## 2023-06-29 DIAGNOSIS — E78.2 MIXED HYPERLIPIDEMIA: ICD-10-CM

## 2023-06-29 DIAGNOSIS — Z00.00 ADULT GENERAL MEDICAL EXAMINATION: Primary | ICD-10-CM

## 2023-06-29 DIAGNOSIS — C49.9 LIPOSARCOMA (HCC): ICD-10-CM

## 2023-06-29 PROCEDURE — 99396 PREV VISIT EST AGE 40-64: CPT | Performed by: INTERNAL MEDICINE

## 2023-06-29 NOTE — TELEPHONE ENCOUNTER
I called Ana Hastings in response to a referral that was received for patient to establish care with Surgical Oncology  Outreach was made to schedule a consultation       I left a voicemail explaining the reason for my call and advised patient to call [facility] [place] at [phone]  Another attempt will be made to contact patient

## 2023-06-29 NOTE — PATIENT INSTRUCTIONS
Wellness Visit for Adults   AMBULATORY CARE:   A wellness visit  is when you see your healthcare provider to get screened for health problems  Your healthcare provider will also give you advice on how to stay healthy  Write down your questions so you remember to ask them  Ask your healthcare provider how often you should have a wellness visit  What happens at a wellness visit:  Your healthcare provider will ask about your health, and your family history of health problems  This includes high blood pressure, heart disease, and cancer  He or she will ask if you have symptoms that concern you, if you smoke, and about your mood  You may also be asked about your intake of medicines, supplements, food, and alcohol  Any of the following may be done: Your weight  will be checked  Your height may also be checked so your body mass index (BMI) can be calculated  Your BMI shows if you are at a healthy weight  Your blood pressure  and heart rate will be checked  Your temperature may also be checked  Blood and urine tests  may be done  Blood tests may be done to check your cholesterol levels  Abnormal cholesterol levels increase your risk for heart disease and stroke  You may also need a blood or urine test to check for diabetes if you are at increased risk  Urine tests may be done to look for signs of an infection or kidney disease  A physical exam  includes checking your heartbeat and lungs with a stethoscope  Your healthcare provider may also check your skin to look for sun damage  Screening tests  may be recommended  A screening test is done to check for diseases that may not cause symptoms  The screening tests you may need depend on your age, gender, family history, and lifestyle habits  For example, colorectal screening may be recommended if you are 48years old or older  Screening tests you need if you are a woman:   A Pap smear  is used to screen for cervical cancer   Pap smears are usually done every 3 to 5 years depending on your age  You may need them more often if you have had abnormal Pap smear test results in the past  Ask your healthcare provider how often you should have a Pap smear  A mammogram  is an x-ray of your breasts to screen for breast cancer  Experts recommend mammograms every 2 years starting at age 48 years  You may need a mammogram at age 52 years or younger if you have an increased risk for breast cancer  Talk to your healthcare provider about when you should start having mammograms and how often you need them  Vaccines you may need:   Get an influenza vaccine  every year  The influenza vaccine protects you from the flu  Several types of viruses cause the flu  The viruses change over time, so new vaccines are made each year  Get a tetanus-diphtheria (Td) booster vaccine  every 10 years  This vaccine protects you against tetanus and diphtheria  Tetanus is a severe infection that may cause painful muscle spasms and lockjaw  Diphtheria is a severe bacterial infection that causes a thick covering in the back of your mouth and throat  Get a human papillomavirus (HPV) vaccine  if you are female and aged 23 to 32 or male 23 to 24 and never received it  This vaccine protects you from HPV infection  HPV is the most common infection spread by sexual contact  HPV may also cause vaginal, penile, and anal cancers  Get a pneumococcal vaccine  if you are aged 72 years or older  The pneumococcal vaccine is an injection given to protect you from pneumococcal disease  Pneumococcal disease is an infection caused by pneumococcal bacteria  The infection may cause pneumonia, meningitis, or an ear infection  Get a shingles vaccine  if you are 60 or older, even if you have had shingles before  The shingles vaccine is an injection to protect you from the varicella-zoster virus  This is the same virus that causes chickenpox   Shingles is a painful rash that develops in people who had chickenpox or have been exposed to the virus  How to eat healthy:  My Plate is a model for planning healthy meals  It shows the types and amounts of foods that should go on your plate  Fruits and vegetables make up about half of your plate, and grains and protein make up the other half  A serving of dairy is included on the side of your plate  The amount of calories and serving sizes you need depends on your age, gender, weight, and height  Examples of healthy foods are listed below:  Eat a variety of vegetables  such as dark green, red, and orange vegetables  You can also include canned vegetables low in sodium (salt) and frozen vegetables without added butter or sauces  Eat a variety of fresh fruits , canned fruit in 100% juice, frozen fruit, and dried fruit  Include whole grains  At least half of the grains you eat should be whole grains  Examples include whole-wheat bread, wheat pasta, brown rice, and whole-grain cereals such as oatmeal     Eat a variety of protein foods such as seafood (fish and shellfish), lean meat, and poultry without skin (turkey and chicken)  Examples of lean meats include pork leg, shoulder, or tenderloin, and beef round, sirloin, tenderloin, and extra lean ground beef  Other protein foods include eggs and egg substitutes, beans, peas, soy products, nuts, and seeds  Choose low-fat dairy products such as skim or 1% milk or low-fat yogurt, cheese, and cottage cheese  Limit unhealthy fats  such as butter, hard margarine, and shortening  Exercise:  Exercise at least 30 minutes per day on most days of the week  Some examples of exercise include walking, biking, dancing, and swimming  You can also fit in more physical activity by taking the stairs instead of the elevator or parking farther away from stores  Include muscle strengthening activities 2 days each week  Regular exercise provides many health benefits   It helps you manage your weight, and decreases your risk for type 2 diabetes, heart disease, stroke, and high blood pressure  Exercise can also help improve your mood  Ask your healthcare provider about the best exercise plan for you  General health and safety guidelines:   Do not smoke  Nicotine and other chemicals in cigarettes and cigars can cause lung damage  Ask your healthcare provider for information if you currently smoke and need help to quit  E-cigarettes or smokeless tobacco still contain nicotine  Talk to your healthcare provider before you use these products  Limit alcohol  A drink of alcohol is 12 ounces of beer, 5 ounces of wine, or 1½ ounces of liquor  Lose weight, if needed  Being overweight increases your risk of certain health conditions  These include heart disease, high blood pressure, type 2 diabetes, and certain types of cancer  Protect your skin  Do not sunbathe or use tanning beds  Use sunscreen with a SPF 15 or higher  Apply sunscreen at least 15 minutes before you go outside  Reapply sunscreen every 2 hours  Wear protective clothing, hats, and sunglasses when you are outside  Drive safely  Always wear your seatbelt  Make sure everyone in your car wears a seatbelt  A seatbelt can save your life if you are in an accident  Do not use your cell phone when you are driving  This could distract you and cause an accident  Pull over if you need to make a call or send a text message  Practice safe sex  Use latex condoms if are sexually active and have more than one partner  Your healthcare provider may recommend screening tests for sexually transmitted infections (STIs)  Wear helmets, lifejackets, and protective gear  Always wear a helmet when you ride a bike or motorcycle, go skiing, or play sports that could cause a head injury  Wear protective equipment when you play sports  Wear a lifejacket when you are on a boat or doing water sports      © Copyright [employer] 2022 Information is for End User's use only and may not be sold, redistributed or otherwise used for commercial purposes  The above information is an  only  It is not intended as medical advice for individual conditions or treatments  Talk to your doctor, nurse or pharmacist before following any medical regimen to see if it is safe and effective for you  You can access the FollowMyHealth Patient Portal offered by [facility] by registering at the following website: [email or web address]. By joining [employer]’s FollowMyHealth portal, you will also be able to view your health information using other applications (apps) compatible with our system.

## 2023-06-29 NOTE — ASSESSMENT & PLAN NOTE
Previously seeing surgical oncology through [facility]  Would like to find a new provider within [facility]'s network   Referral was given

## 2023-06-29 NOTE — PROGRESS NOTES
[address] [facility] CARE [place]    NAME: Coral Hsu  AGE: 60 y o  SEX: female  : 1965     DATE: 2023     Assessment and Plan:     Problem List Items Addressed This Visit        Other    Mixed hyperlipidemia    Relevant Orders    Lipid Panel with Direct LDL reflex    Comprehensive metabolic panel    CBC    Liposarcoma [facility])     Previously seeing surgical oncology through [facility]  Would like to find a new provider within [facility]  Referral was given         Relevant Orders    Ambulatory Referral to Surgical Oncology   Other Visit Diagnoses     Adult general medical examination    -  Primary    Chronic cough            Overall she is doing well  Previous studies reviewed  Her cough has not changed recently  Discussed common reasons for chronic, intermittent dry cough  She does have underlying allergies  Also discussed silent reflux and trial of PPIs x 2-3 months  Check updated lab work  Will call with results  Immunizations and preventive care screenings were discussed with patient today  Appropriate education was printed on patient's after visit summary  Counseling:  Alcohol/drug use: discussed moderation in alcohol intake, the recommendations for healthy alcohol use, and avoidance of illicit drug use  Dental Health: discussed importance of regular tooth brushing, flossing, and dental visits  Injury prevention: discussed safety/seat belts, safety helmets, smoke detectors, carbon dioxide detectors, and smoking near bedding or upholstery  Sexual health: discussed sexually transmitted diseases, partner selection, use of condoms, avoidance of unintended pregnancy, and contraceptive alternatives  · Exercise: the importance of regular exercise/physical activity was discussed  Recommend exercise 3-5 times per week for at least 30 minutes         Depression Screening and Follow-up Plan: Patient was screened for depression during today's encounter  They screened negative with a PHQ-2 score of 0  Return in about 1 year (around 6/29/2024) for Annual Physical      Chief Complaint:     Chief Complaint   Patient presents with   • Annual Exam   • Cough     Dry / chronic       History of Present Illness:     Adult Annual Physical   Patient here for a comprehensive physical exam  The patient reports overall she has been doing well  History of liposarcoma that was excised from her back  No evidence of disease recurrence  Needs to find new surgical oncologist      Lung nodules monitored by pulmonary  Stable from CT scan in Dec 2022  Does not chronic issues with cough  Has allergies and uses nasal spray and anti-histamines  Cough has been on/off for 30 years  Tested for asthma in the past and was negative  Denies any recent heartburn problems  Cholesterol has been high over the years  No history of ASCVD  Denies any significant family history of cardiovascular disease  Depression Screening  PHQ-2/9 Depression Screening    Little interest or pleasure in doing things: 0 - not at all  Feeling down, depressed, or hopeless: 0 - not at all  PHQ-2 Score: 0  PHQ-2 Interpretation: Negative depression screen        Review of Systems:     Review of Systems   Constitutional: Negative for activity change, appetite change and fatigue  HENT: Negative  Eyes: Negative  Respiratory: Positive for cough (chronic, intermittent, dry)  Negative for apnea, chest tightness, shortness of breath and wheezing  Cardiovascular: Negative for chest pain, palpitations and leg swelling  Gastrointestinal: Negative for abdominal distention, abdominal pain, blood in stool, constipation, diarrhea, nausea and vomiting  Genitourinary: Negative  Musculoskeletal: Negative for arthralgias, back pain, gait problem, joint swelling and myalgias  Skin: Negative for rash and wound     Neurological: Negative for dizziness, weakness, light-headedness, numbness "and headaches  Psychiatric/Behavioral: Negative for behavioral problems, confusion, hallucinations, sleep disturbance and suicidal ideas  The patient is not nervous/anxious  Past Medical History:     Past Medical History:   Diagnosis Date   • Atypical lipomatous tumor ([facility] [address] ) 11/17/2021    Formatting of this note might be different from the original  Removed 11/04/2021  Last Assessment & Plan:  Formatting of this note might be different from the original  S/p excision of intramuscular large right upper back mass on 11/4/21  Reviewed her surgical pathology result with the patient    And then, we discussed a need for additional workup such CT chest/abdomen/and pelvis to make sure that   • Cancer ([address] )     4/15/22 Pt reports right kidney cancer    • GERD (gastroesophageal reflux disease)     4/15/22 Pt reports on occasion - takes no medications -reports using TUMS as needed   • History of HPV infection    • Hyperlipidemia     4/15/22 Pt reports cholesterol is borderline - on no medications at this time   • Renal lesion    • Seasonal allergies     4/15/22 Pt reports has shortness of breath only with seasonal allergies \"tightness with breathing reported with allergies \"   • Subarachnoid hemorrhage ([facility] [address] ) 10/30/2014   • Telangiectasias 2/4/2021   • Vasomotor rhinitis 2/5/2021   • Vulvar intraepithelial neoplasia (PHILIP) grade 3 12/9/2016      Past Surgical History:     Past Surgical History:   Procedure Laterality Date   • CERVIX SURGERY      Cervical cyst removed / also for HPV   • COLONOSCOPY     • DILATION AND CURETTAGE OF UTERUS     • EGD     • KIDNEY SURGERY Right    • [place] LAPAROSCOPY SURG PARTIAL NEPHRECTOMY Right 05/02/2022    Procedure: ROBOTIC LAPAROSCOPIC PARTIAL NEPHRECTOMY, INTRAOPERATIVE ULTRASOUND WITH INTERPRETATION;  Surgeon: Lloyd Lopez MD;  [facility];  Service: Urology   • SOFT TISSUE MASS EXCISION  11/2021    liposarcoma   • UTERINE FIBROID SURGERY      4/15/22 Pt reports had " fibroid removed - indicated cervical area       Social History:     Social History     Socioeconomic History   • Marital status: /Civil Union     Spouse name: None   • Number of children: None   • Years of education: None   • Highest education level: None   Occupational History   • None   Tobacco Use   • Smoking status: Never   • Smokeless tobacco: Never   • Tobacco comments:     Denies any former or current use    Vaping Use   • Vaping Use: Never used   Substance and Sexual Activity   • Alcohol use: Never     Comment: Denies any former or current use of alcohol    • Drug use: Never     Comment: Denies any former or current drug use    • Sexual activity: Not Currently     Comment: Reports not active at this time   Other Topics Concern   • None   Social History Narrative   • None     Social Determinants of Health     Financial Resource Strain: Not on file   Food Insecurity: Not on file   Transportation Needs: Not on file   Physical Activity: Inactive (1/26/2021)    Exercise Vital Sign    • Days of Exercise per Week: 0 days    • Minutes of Exercise per Session: 0 min   Stress: No Stress Concern Present (1/26/2021)    [address]    • Feeling of Stress :  Only a little   Social Connections: Not on file   Intimate Partner Violence: Not on file   Housing Stability: Not on file      Family History:     Family History   Problem Relation Age of Onset   • No Known Problems Mother    • Alzheimer's disease Father    • No Known Problems Sister    • No Known Problems Daughter       Current Medications:     Current Outpatient Medications   Medication Sig Dispense Refill   • alendronate (FOSAMAX) 70 mg tablet Take 1 tablet (70 mg total) by mouth every 7 days 12 tablet 3   • latanoprost (XALATAN) 0 005 % ophthalmic solution Administer 1 drop to both eyes daily at bedtime       • multivitamin (THERAGRAN) TABS Take 1 tablet by mouth daily     • diclofenac sodium "(VOLTAREN) 50 mg EC tablet Take 1 tablet (50 mg total) by mouth 3 (three) times a day for 7 days 21 tablet 0   • docusate sodium (COLACE) 100 mg capsule Take 1 capsule (100 mg total) by mouth 2 (two) times a day for 15 days 30 capsule 0     No current facility-administered medications for this visit  Allergies: Allergies   Allergen Reactions   • Other Sneezing     Cats, dogs, seasonal - pt reports hard to breath and coughing with these allergies       Physical Exam:     /60 (BP Location: Left arm, Patient Position: Sitting, Cuff Size: Standard)   Pulse 74   Temp 97 9 °F (36 6 °C) (Tympanic)   Ht 5' 4\" (1 626 m)   Wt 54 4 kg (120 lb)   SpO2 99%   BMI 20 60 kg/m²     Physical Exam  Vitals reviewed  Constitutional:       General: She is not in acute distress  Appearance: She is well-developed  She is not diaphoretic  HENT:      Right Ear: Tympanic membrane, ear canal and external ear normal  There is no impacted cerumen  Left Ear: Tympanic membrane, ear canal and external ear normal  There is no impacted cerumen  Eyes:      General: No scleral icterus  Right eye: No discharge  Left eye: No discharge  Conjunctiva/sclera: Conjunctivae normal    Neck:      Thyroid: No thyromegaly  Vascular: No JVD  Cardiovascular:      Rate and Rhythm: Normal rate and regular rhythm  Heart sounds: Normal heart sounds  No murmur heard  Pulmonary:      Effort: Pulmonary effort is normal  No respiratory distress  Breath sounds: Normal breath sounds  No wheezing or rales  Abdominal:      General: Bowel sounds are normal  There is no distension  Palpations: Abdomen is soft  Tenderness: There is no abdominal tenderness  Musculoskeletal:      Cervical back: Neck supple  Right lower leg: No edema  Left lower leg: No edema  Lymphadenopathy:      Cervical: No cervical adenopathy  Skin:     General: Skin is warm and dry     Neurological:      Mental " Status: She is alert     Psychiatric:         Mood and Affect: Mood normal          Behavior: Behavior normal         Matt Jade DO  [address]

## 2023-07-03 ENCOUNTER — TELEPHONE (OUTPATIENT)
Dept: HEMATOLOGY ONCOLOGY | Facility: CLINIC | Age: 58
End: 2023-07-03

## 2023-07-03 NOTE — TELEPHONE ENCOUNTER
I called Vince Grey in response to a referral that was received for patient to establish care with Surgical Oncology.      Outreach was made to schedule a consultation. .     I left a voicemail explaining the reason for my call and advised patient to call [facility] at [phone]. Another attempt will be made to contact patient.

## 2023-07-11 ENCOUNTER — APPOINTMENT (OUTPATIENT)
Age: 58
End: 2023-07-11
Payer: COMMERCIAL

## 2023-07-11 DIAGNOSIS — E78.2 MIXED HYPERLIPIDEMIA: ICD-10-CM

## 2023-07-11 LAB
ALBUMIN SERPL BCP-MCNC: 3.8 G/DL (ref 3.5–5)
ALP SERPL-CCNC: 65 U/L (ref 46–116)
ALT SERPL W P-5'-P-CCNC: 23 U/L (ref 12–78)
ANION GAP SERPL CALCULATED.3IONS-SCNC: 4 MMOL/L
AST SERPL W P-5'-P-CCNC: 18 U/L (ref 5–45)
BILIRUB SERPL-MCNC: 0.78 MG/DL (ref 0.2–1)
BUN SERPL-MCNC: 15 MG/DL (ref 5–25)
CALCIUM SERPL-MCNC: 8.7 MG/DL (ref 8.3–10.1)
CHLORIDE SERPL-SCNC: 111 MMOL/L (ref 96–108)
CHOLEST SERPL-MCNC: 206 MG/DL
CO2 SERPL-SCNC: 28 MMOL/L (ref 21–32)
CREAT SERPL-MCNC: 0.76 MG/DL (ref 0.6–1.3)
ERYTHROCYTE [DISTWIDTH] IN BLOOD BY AUTOMATED COUNT: 13.2 % (ref 11.6–15.1)
GFR SERPL CREATININE-BSD FRML MDRD: 86 ML/MIN/1.73SQ M
GLUCOSE SERPL-MCNC: 196 MG/DL (ref 65–140)
HCT VFR BLD AUTO: 39.9 % (ref 34.8–46.1)
HDLC SERPL-MCNC: 48 MG/DL
HGB BLD-MCNC: 13 G/DL (ref 11.5–15.4)
LDLC SERPL CALC-MCNC: 134 MG/DL (ref 0–100)
MCH RBC QN AUTO: 29.9 PG (ref 26.8–34.3)
MCHC RBC AUTO-ENTMCNC: 32.6 G/DL (ref 31.4–37.4)
MCV RBC AUTO: 92 FL (ref 82–98)
PLATELET # BLD AUTO: 314 THOUSANDS/UL (ref 149–390)
PMV BLD AUTO: 9.6 FL (ref 8.9–12.7)
POTASSIUM SERPL-SCNC: 3.4 MMOL/L (ref 3.5–5.3)
PROT SERPL-MCNC: 6.8 G/DL (ref 6.4–8.4)
RBC # BLD AUTO: 4.35 MILLION/UL (ref 3.81–5.12)
SODIUM SERPL-SCNC: 143 MMOL/L (ref 135–147)
TRIGL SERPL-MCNC: 121 MG/DL
WBC # BLD AUTO: 6.75 THOUSAND/UL (ref 4.31–10.16)

## 2023-07-11 PROCEDURE — 80053 COMPREHEN METABOLIC PANEL: CPT

## 2023-07-11 PROCEDURE — 85027 COMPLETE CBC AUTOMATED: CPT

## 2023-07-11 PROCEDURE — 36415 COLL VENOUS BLD VENIPUNCTURE: CPT

## 2023-07-11 PROCEDURE — 80061 LIPID PANEL: CPT

## 2023-07-12 ENCOUNTER — TELEPHONE (OUTPATIENT)
Age: 58
End: 2023-07-12

## 2023-07-12 DIAGNOSIS — R73.9 HYPERGLYCEMIA: Primary | ICD-10-CM

## 2023-07-12 NOTE — TELEPHONE ENCOUNTER
----- Message from Goran Schwartz DO sent at 7/12/2023 11:46 AM EDT -----  Cholesterol still high but a little better than last year going from 222 to 206. Triglycerides improved from 223 to 221. Kidney function and liver function look good. Her glucose level was higher than in the past. It came back at 196 (should be 99 or less). Need to look into this further. Potassium was a little low at 3.4. So plan right now would be to increase potassium intake in diet. Get repeat potassium level in 2 weeks and will also add on some orders to further evaluate where there is any evidence of pre-diabetes or diabetes.

## 2023-07-19 PROBLEM — Z85.831 HISTORY OF SARCOMA: Status: ACTIVE | Noted: 2021-12-22

## 2023-07-20 ENCOUNTER — CONSULT (OUTPATIENT)
Dept: SURGICAL ONCOLOGY | Facility: CLINIC | Age: 58
End: 2023-07-20
Payer: COMMERCIAL

## 2023-07-20 VITALS
BODY MASS INDEX: 20.76 KG/M2 | OXYGEN SATURATION: 96 % | HEIGHT: 64 IN | WEIGHT: 121.6 LBS | TEMPERATURE: 98.2 F | DIASTOLIC BLOOD PRESSURE: 70 MMHG | HEART RATE: 103 BPM | RESPIRATION RATE: 15 BRPM | SYSTOLIC BLOOD PRESSURE: 110 MMHG

## 2023-07-20 DIAGNOSIS — Z85.831 HISTORY OF SARCOMA: Primary | ICD-10-CM

## 2023-07-20 DIAGNOSIS — C49.9 LIPOSARCOMA (HCC): ICD-10-CM

## 2023-07-20 PROCEDURE — 99203 OFFICE O/P NEW LOW 30 MIN: CPT | Performed by: SURGERY

## 2023-07-20 RX ORDER — FLUTICASONE PROPIONATE 50 MCG
1 SPRAY, SUSPENSION (ML) NASAL AS NEEDED
COMMUNITY

## 2023-07-20 NOTE — PROGRESS NOTES
Surgical Oncology Consult       [facility] [address] [facility] [address]  [facility] [address]  [place] [address] [address] [place] [address]    Zina Dance  1965  [id number]  [facility]  [facility] [address]  [facility]  [phone]    Diagnoses and all orders for this visit:    History of sarcoma    Liposarcoma [facility])  -     Ambulatory Referral to Surgical Oncology        Chief Complaint   Patient presents with   • Consult       No follow-ups on file. Oncology History    No history exists. History of Present Illness: 25-year-old female who noticed a rapidly expanding back mass 2 years ago. She underwent excision of an 11 x 7 cm intramuscular right upper back mass in November 2021. This was treated as a lipoma and was removed with grossly negative margins. The mass was intramuscular. 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 Excision was felt to be marginal.  She has been followed at [facility], and is now being evaluated here. MRI from July 15, 2023 revealed no evidence of recurrence. There was some soft tissue that was felt to be postsurgical.  I personally reviewed the films. She comes in now to discuss further therapy. Review of Systems  Complete ROS Surg Onc:   Constitutional: The patient denies new or recent history of general fatigue, no recent weight loss, no change in appetite. Eyes: No complaints of visual problems, no scleral icterus. ENT: no complaints of ear pain, no hoarseness, no difficulty swallowing,  no tinnitus and no new masses in head, oral cavity, or neck. Cardiovascular: No complaints of chest pain, no palpitations, no ankle edema. Respiratory: No complaints of shortness of breath, no cough. Gastrointestinal: No complaints of jaundice, no bloody stools, no pale stools. Genitourinary: No complaints of dysuria, no hematuria, no nocturia, no frequent urination, no urethral discharge. Musculoskeletal: No complaints of weakness, paralysis, joint stiffness or arthralgias. Integumentary: No complaints of rash, no new lesions. Neurological: No complaints of convulsions, no seizures, no dizziness. Hematologic/Lymphatic: No complaints of easy bruising. Endocrine:  No hot or cold intolerance. No polydipsia, polyphagia, or polyuria. Allergy/immunology:  No environmental allergies. No food allergies. Not immunocompromised. Skin:  No pallor or rash. No wound. Patient Active Problem List   Diagnosis   • Gastroesophageal reflux disease with esophagitis   • Angiomyolipoma of right kidney   • History of sarcoma   • Glaucoma   • Mixed hyperlipidemia   • Osteopenia with high risk of fracture   • Lung nodule   • History of subarachnoid hemorrhage     Past Medical History:   Diagnosis Date   • Atypical lipomatous tumor ([address]) 11/17/2021    Formatting of this note might be different from the original. Removed 11/04/2021  Last Assessment & Plan:  Formatting of this note might be different from the original. S/p excision of intramuscular large right upper back mass on 11/4/21. Reviewed her surgical pathology result with the patient.   And then, we discussed a need for additional workup such CT chest/abdomen/and pelvis to make sure that   • Cancer ([address])     4/15/22 Pt reports right kidney cancer    • GERD (gastroesophageal reflux disease)     4/15/22 Pt reports on occasion - takes no medications -reports using TUMS as needed   • History of HPV infection    • Hyperlipidemia     4/15/22 Pt reports cholesterol is borderline - on no medications at this time   • Renal lesion    • Seasonal allergies     4/15/22 Pt reports has shortness of breath only with seasonal allergies "tightness with breathing reported with allergies "   • Subarachnoid hemorrhage ([address]) 10/30/2014   • Telangiectasias 2/4/2021   • Vasomotor rhinitis 2/5/2021   • Vulvar intraepithelial neoplasia (PHILIP) grade 3 12/9/2016     Past Surgical History:   Procedure Laterality Date   • CERVIX SURGERY      Cervical cyst removed / also for HPV   • COLONOSCOPY     • DILATION AND CURETTAGE OF UTERUS     • EGD     • KIDNEY SURGERY Right    • [place] LAPAROSCOPY SURG PARTIAL NEPHRECTOMY Right 05/02/2022    Procedure: ROBOTIC LAPAROSCOPIC PARTIAL NEPHRECTOMY, INTRAOPERATIVE ULTRASOUND WITH INTERPRETATION;  Surgeon: Yue Braxton MD;  [facility];  Service: Urology   • SOFT TISSUE MASS EXCISION  11/2021    liposarcoma   • UTERINE FIBROID SURGERY      4/15/22 Pt reports had fibroid removed - indicated cervical area      Family History   Problem Relation Age of Onset   • No Known Problems Mother    • Alzheimer's disease Father    • No Known Problems Sister    • No Known Problems Daughter      Social History     Socioeconomic History   • Marital status: /Civil Union     Spouse name: Not on file   • Number of children: Not on file   • Years of education: Not on file   • Highest education level: Not on file   Occupational History   • Not on file   Tobacco Use   • Smoking status: Never   • Smokeless tobacco: Never   • Tobacco comments:     Denies any former or current use    Vaping Use   • Vaping Use: Never used   Substance and Sexual Activity   • Alcohol use: Never     Comment: Denies any former or current use of alcohol    • Drug use: Never     Comment: Denies any former or current drug use    • Sexual activity: Not Currently     Comment: Reports not active at this time   Other Topics Concern   • Not on file   Social History Narrative   • Not on file     Social Determinants of Health     Financial Resource Strain: Not on file   Food Insecurity: Not on file   Transportation Needs: Not on file   Physical Activity: Inactive (1/26/2021)    Exercise Vital Sign    • Days of Exercise per Week: 0 days    • Minutes of Exercise per Session: 0 min   Stress: No Stress Concern Present (1/26/2021)    [address] [facility]    • Feeling of Stress : Only a little   Social Connections: Not on file   Intimate Partner Violence: Not on file   Housing Stability: Not on file       Current Outpatient Medications:   •  alendronate (FOSAMAX) 70 mg tablet, Take 1 tablet (70 mg total) by mouth every 7 days, Disp: 12 tablet, Rfl: 3  •  docusate sodium (COLACE) 100 mg capsule, Take 1 capsule (100 mg total) by mouth 2 (two) times a day for 15 days, Disp: 30 capsule, Rfl: 0  •  latanoprost (XALATAN) 0.005 % ophthalmic solution, Administer 1 drop to both eyes daily at bedtime  , Disp: , Rfl:   •  multivitamin (THERAGRAN) TABS, Take 1 tablet by mouth daily, Disp: , Rfl:   Allergies   Allergen Reactions   • Other Sneezing     Cats, dogs, seasonal - pt reports hard to breath and coughing with these allergies      There were no vitals filed for this visit. Physical Exam   Constitutional: General appearance: The Patient is well-developed and well-nourished who appears the stated age in no acute distress. Patient is pleasant and talkative. HEENT:  Normocephalic. Sclerae are anicteric. Mucous membranes are moist. Neck is supple without adenopathy. No JVD. Chest: The lungs are clear to auscultation. Cardiac: Heart is regular rate. Extremities: There is no clubbing or cyanosis. There is no edema. Symmetric. Neuro: Grossly nonfocal. Gait is normal.     Lymphatic: No evidence of cervical adenopathy bilaterally. No evidence of axillary adenopathy bilaterally. Skin: Warm, anicteric. Excision site on the right upper back is healed well. There is no evidence of local recurrence or nodularity. Psych:  Patient is pleasant and talkative. 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 This was intramuscular. Marginally excised.     Labs:      Imaging  MRI chest mediastinum wo and w contrast    Result Date: 7/18/2023  Narrative: [id number]    MRI CHEST WWO CONTRAST    Result Date: 7/15/2023  Narrative: History: Fractured liposarcoma. TECHNIQUE: Multiplanar, multisequence, pre and post 11 cc intravenous Dotarem contrast-enhanced MRI of the attention to the liposarcoma treated region. 7/16/2022. FINDINGS: There is subtle heterogeneous superficial soft tissue in the right posterior inferior thoracic wall the surgical site. This likely represents postsurgical scarring. No well-defined mass seen. No abnormal enhancement seen. Muscle signal is within normal limits. No well-defined fluid collection seen. Impression: IMPRESSION: 1. No recurrent or metastatic disease identified. Workstation:[id number]    I reviewed the above laboratory and imaging data. Discussion/Summary: 79-year-old female with a marginally excised atypical lipomatous tumor. She is clinically FLORECITA at 18 months. There is some nodularity on MRI, and this is likely postsurgical.  Since a course of observation was recommended previously, I would recommend continued observation. We discussed the natural history of atypical lipomatous tumors. She is likely prone to local recurrence if anything. The chance of distant metastasis is low. I would recommend continued observation. Since there is vague nodularity seen on the MRI, I would repeat the MRI in 6 months. I will see her again at that time for another clinical exam.  She is agreeable to this plan. All her questions were answered.

## 2023-09-15 ENCOUNTER — APPOINTMENT (OUTPATIENT)
Age: 58
End: 2023-09-15
Payer: COMMERCIAL

## 2023-09-15 DIAGNOSIS — R73.9 HYPERGLYCEMIA: ICD-10-CM

## 2023-09-15 LAB
ANION GAP SERPL CALCULATED.3IONS-SCNC: 9 MMOL/L
BUN SERPL-MCNC: 13 MG/DL (ref 5–25)
CALCIUM SERPL-MCNC: 8.9 MG/DL (ref 8.4–10.2)
CHLORIDE SERPL-SCNC: 105 MMOL/L (ref 96–108)
CO2 SERPL-SCNC: 27 MMOL/L (ref 21–32)
CREAT SERPL-MCNC: 0.7 MG/DL (ref 0.6–1.3)
CREAT UR-MCNC: 39.6 MG/DL
GFR SERPL CREATININE-BSD FRML MDRD: 95 ML/MIN/1.73SQ M
GLUCOSE P FAST SERPL-MCNC: 76 MG/DL (ref 65–99)
MICROALBUMIN UR-MCNC: <7 MG/L
MICROALBUMIN/CREAT 24H UR: <18 MG/G CREATININE (ref 0–30)
POTASSIUM SERPL-SCNC: 3.8 MMOL/L (ref 3.5–5.3)
SODIUM SERPL-SCNC: 141 MMOL/L (ref 135–147)

## 2023-09-15 PROCEDURE — 82570 ASSAY OF URINE CREATININE: CPT

## 2023-09-15 PROCEDURE — 83036 HEMOGLOBIN GLYCOSYLATED A1C: CPT

## 2023-09-15 PROCEDURE — 82043 UR ALBUMIN QUANTITATIVE: CPT

## 2023-09-15 PROCEDURE — 80048 BASIC METABOLIC PNL TOTAL CA: CPT

## 2023-09-15 PROCEDURE — 36415 COLL VENOUS BLD VENIPUNCTURE: CPT

## 2023-09-16 LAB
EST. AVERAGE GLUCOSE BLD GHB EST-MCNC: 120 MG/DL
HBA1C MFR BLD: 5.8 %

## 2023-09-18 ENCOUNTER — TELEPHONE (OUTPATIENT)
Age: 58
End: 2023-09-18

## 2023-09-18 NOTE — TELEPHONE ENCOUNTER
----- Message from Shea Hoyt DO sent at 9/17/2023  8:24 AM EDT -----  No evidence of diabetes. A1c was 5.8% which is mildly in the pre-diabetic range. Will monitor.

## 2024-01-09 ENCOUNTER — APPOINTMENT (OUTPATIENT)
Dept: LAB | Facility: HOSPITAL | Age: 59
End: 2024-01-09
Payer: COMMERCIAL

## 2024-01-09 DIAGNOSIS — C49.9 LIPOSARCOMA (HCC): ICD-10-CM

## 2024-01-09 LAB
BUN SERPL-MCNC: 14 MG/DL (ref 5–25)
CREAT SERPL-MCNC: 0.7 MG/DL (ref 0.6–1.3)
GFR SERPL CREATININE-BSD FRML MDRD: 95 ML/MIN/1.73SQ M

## 2024-01-09 PROCEDURE — 84520 ASSAY OF UREA NITROGEN: CPT

## 2024-01-09 PROCEDURE — 82565 ASSAY OF CREATININE: CPT

## 2024-01-09 PROCEDURE — 36415 COLL VENOUS BLD VENIPUNCTURE: CPT

## 2024-01-20 DIAGNOSIS — M85.80 OSTEOPENIA WITH HIGH RISK OF FRACTURE: ICD-10-CM

## 2024-01-21 RX ORDER — ALENDRONATE SODIUM 70 MG/1
70 TABLET ORAL
Qty: 12 TABLET | Refills: 3 | Status: SHIPPED | OUTPATIENT
Start: 2024-01-21 | End: 2025-01-15

## 2024-01-23 ENCOUNTER — TELEPHONE (OUTPATIENT)
Age: 59
End: 2024-01-23

## 2024-01-23 ENCOUNTER — OFFICE VISIT (OUTPATIENT)
Age: 59
End: 2024-01-23
Payer: COMMERCIAL

## 2024-01-23 ENCOUNTER — APPOINTMENT (OUTPATIENT)
Age: 59
End: 2024-01-23
Payer: COMMERCIAL

## 2024-01-23 VITALS
HEART RATE: 111 BPM | TEMPERATURE: 98.2 F | WEIGHT: 119 LBS | BODY MASS INDEX: 20.43 KG/M2 | DIASTOLIC BLOOD PRESSURE: 80 MMHG | OXYGEN SATURATION: 98 % | SYSTOLIC BLOOD PRESSURE: 124 MMHG

## 2024-01-23 DIAGNOSIS — R10.11 RIGHT UPPER QUADRANT PAIN: ICD-10-CM

## 2024-01-23 DIAGNOSIS — R19.4 CHANGE IN BOWEL HABIT: ICD-10-CM

## 2024-01-23 DIAGNOSIS — M54.50 ACUTE RIGHT-SIDED LOW BACK PAIN WITHOUT SCIATICA: ICD-10-CM

## 2024-01-23 DIAGNOSIS — D17.71 ANGIOMYOLIPOMA OF RIGHT KIDNEY: Chronic | ICD-10-CM

## 2024-01-23 DIAGNOSIS — R10.11 RIGHT UPPER QUADRANT PAIN: Primary | ICD-10-CM

## 2024-01-23 DIAGNOSIS — C49.9 LIPOSARCOMA (HCC): ICD-10-CM

## 2024-01-23 DIAGNOSIS — M85.80 OSTEOPENIA WITH HIGH RISK OF FRACTURE: Chronic | ICD-10-CM

## 2024-01-23 LAB
ALBUMIN SERPL BCP-MCNC: 4.5 G/DL (ref 3.5–5)
ALP SERPL-CCNC: 58 U/L (ref 34–104)
ALT SERPL W P-5'-P-CCNC: 18 U/L (ref 7–52)
AMYLASE SERPL-CCNC: 46 IU/L (ref 29–103)
ANION GAP SERPL CALCULATED.3IONS-SCNC: 9 MMOL/L
AST SERPL W P-5'-P-CCNC: 22 U/L (ref 13–39)
BILIRUB SERPL-MCNC: 0.84 MG/DL (ref 0.2–1)
BUN SERPL-MCNC: 16 MG/DL (ref 5–25)
CALCIUM SERPL-MCNC: 9.8 MG/DL (ref 8.4–10.2)
CHLORIDE SERPL-SCNC: 107 MMOL/L (ref 96–108)
CO2 SERPL-SCNC: 29 MMOL/L (ref 21–32)
CREAT SERPL-MCNC: 0.75 MG/DL (ref 0.6–1.3)
ERYTHROCYTE [DISTWIDTH] IN BLOOD BY AUTOMATED COUNT: 13.3 % (ref 11.6–15.1)
GFR SERPL CREATININE-BSD FRML MDRD: 87 ML/MIN/1.73SQ M
GLUCOSE P FAST SERPL-MCNC: 85 MG/DL (ref 65–99)
HCT VFR BLD AUTO: 45.9 % (ref 34.8–46.1)
HGB BLD-MCNC: 14.9 G/DL (ref 11.5–15.4)
LIPASE SERPL-CCNC: <6 U/L (ref 11–82)
MCH RBC QN AUTO: 30.1 PG (ref 26.8–34.3)
MCHC RBC AUTO-ENTMCNC: 32.5 G/DL (ref 31.4–37.4)
MCV RBC AUTO: 93 FL (ref 82–98)
PLATELET # BLD AUTO: 374 THOUSANDS/UL (ref 149–390)
PMV BLD AUTO: 9.8 FL (ref 8.9–12.7)
POTASSIUM SERPL-SCNC: 3.9 MMOL/L (ref 3.5–5.3)
PROT SERPL-MCNC: 7 G/DL (ref 6.4–8.4)
RBC # BLD AUTO: 4.95 MILLION/UL (ref 3.81–5.12)
SODIUM SERPL-SCNC: 145 MMOL/L (ref 135–147)
WBC # BLD AUTO: 9.31 THOUSAND/UL (ref 4.31–10.16)

## 2024-01-23 PROCEDURE — 36415 COLL VENOUS BLD VENIPUNCTURE: CPT

## 2024-01-23 PROCEDURE — 85027 COMPLETE CBC AUTOMATED: CPT

## 2024-01-23 PROCEDURE — 99214 OFFICE O/P EST MOD 30 MIN: CPT

## 2024-01-23 PROCEDURE — 82150 ASSAY OF AMYLASE: CPT

## 2024-01-23 PROCEDURE — 83690 ASSAY OF LIPASE: CPT

## 2024-01-23 PROCEDURE — 80053 COMPREHEN METABOLIC PANEL: CPT

## 2024-01-23 NOTE — TELEPHONE ENCOUNTER
----- Message from Ksenia Rodriguez PA-C sent at 1/23/2024 11:24 AM EST -----  Regarding: Stool test  Please call the patient and inform her that I have added a stool sample to her testing to get done with her lab work.

## 2024-01-23 NOTE — TELEPHONE ENCOUNTER
MOI: Ksenia Rodriguez     Labs were done today; active in process    Faxed clinical for the STAT CT Abd/Pelvis ordered today  Clinical was needed 1st; in order to approve  Dr De Santiago  Case#: [id number]  Site: [facility]  F#: [phone]  Clinical was Faxed around 11AM     Informed Julieth that clinical was needed 1st with this case.     Will call back in an hour or so; to see if the clinical was received and speak to a nurse

## 2024-01-23 NOTE — PROGRESS NOTES
Name: Salazar Coughlin      : 1965      MRN: [id number]  Encounter Provider: Ksenia Rodriguez PA-C  Encounter Date: 2024   Encounter department: [facility]    Assessment & Plan     1. Right upper quadrant pain  Comments:  Intermittent over the past few weeks. CT of abdomen placed due to history of a R kidney tumor, RUQ pain that radiates to the back, and a change in bowel habits.  Orders:  -     US right upper quadrant with liver dopplers; Future; Expected date: 2024  -     CT abdomen pelvis w wo contrast; Future; Expected date: 2024  -     CBC and Platelet; Future  -     Comprehensive metabolic panel; Future  -     Amylase; Future  -     Lipase; Future    2. Change in bowel habit  Assessment & Plan:  Small, soft, oily stool for the past 4 days. Recommended she try taking Miralax for a few days since she has not had a significant bowel movement in several days. Educated the patient that I will reach out to her after the results of her lab work and imaging are completed with our next steps. Instructed the patient to go to the ER if her symptoms worsen, she develops fever, chills, or worsening abdominal pain.     Orders:  -     Fecal fat, quantitative; Future; Expected date: 2024    3. Acute right-sided low back pain without sciatica  Comments:  CT of abdomen placed since she has history of a R kidney tumor, RUQ pain that radiates to the back, and a change in bowel habits.    4. Angiomyolipoma of right kidney  Assessment & Plan:  S/p laparoscopic surgery partial nephrectomy (Right).    Orders:  -     CT abdomen pelvis w wo contrast; Future; Expected date: 2024    5. Osteopenia with high risk of fracture  Assessment & Plan:  Takes alendronate.       6. Liposarcoma (HCC)  Assessment & Plan:  S/p resection of tumor. She follows with surgical oncology. She has an MRI scheduled next week and a follow-up appointment with surgical oncology in February.               Subjective      Abdominal pain for the past three-four weeks. Located in right upper quadrant. Intermittant pain that she describes as achy, sore, pinching, and a similar sensation to a muscle pull. Rated 4/10 at worst and only a mild ache currently. She also complains of intermittent achy pain located on her right lower back for the past few weeks. She has had abnormal bowel movements since Friday. She has had small amounts of stool pass several times a day and her stool seems oily. She has not eaten differently over the past few days. She denies fever or chills. Her symptoms do not worsen after eating meals. She does not feel that she is constipated, although she had not had a significant bowel movement for several days.             Review of Systems   Constitutional:  Negative for chills and fever.   HENT:  Negative for congestion and sore throat.    Eyes:  Negative for pain and visual disturbance.   Respiratory:  Negative for cough and shortness of breath.    Cardiovascular:  Negative for chest pain and palpitations.   Gastrointestinal:  Positive for abdominal pain and diarrhea (soft, oily stool for past few days). Negative for constipation, nausea and vomiting.   Genitourinary:  Negative for dysuria and hematuria.   Musculoskeletal:  Positive for back pain (right lumbar region). Negative for arthralgias and myalgias.   Skin:  Negative for color change and rash.   Neurological:  Negative for dizziness and headaches.       Current Outpatient Medications on File Prior to Visit   Medication Sig    alendronate (FOSAMAX) 70 mg tablet TAKE 1 TABLET (70 MG TOTAL) BY MOUTH EVERY 7 DAYS    fluticasone (FLONASE) 50 mcg/act nasal spray 1 spray into each nostril as needed for rhinitis    latanoprost (XALATAN) 0.005 % ophthalmic solution Administer 1 drop to both eyes daily at bedtime      multivitamin (THERAGRAN) TABS Take 1 tablet by mouth daily    [DISCONTINUED] docusate sodium (COLACE) 100 mg capsule Take 1 capsule  (100 mg total) by mouth 2 (two) times a day for 15 days (Patient not taking: Reported on 1/23/2024)    [DISCONTINUED] Oxymetazoline HCl (NASAL SPRAY NA) into each nostril Patient takes nasal spray, does not know of type of nasal spray. (Patient not taking: Reported on 1/23/2024)       Objective     /80 (BP Location: Left arm, Patient Position: Sitting, Cuff Size: Standard)   Pulse (!) 111   Temp 98.2 °F (36.8 °C) (Tympanic)   Wt 54 kg (119 lb)   SpO2 98%   BMI 20.43 kg/m²     Physical Exam  Constitutional:       Appearance: Normal appearance.   HENT:      Head: Normocephalic and atraumatic.      Nose: Nose normal.      Mouth/Throat:      Mouth: Mucous membranes are moist.      Pharynx: Oropharynx is clear. No oropharyngeal exudate.   Eyes:      Conjunctiva/sclera: Conjunctivae normal.   Cardiovascular:      Rate and Rhythm: Normal rate and regular rhythm.      Pulses: Normal pulses.      Heart sounds: Normal heart sounds. No murmur heard.     No gallop.   Pulmonary:      Effort: Pulmonary effort is normal.      Breath sounds: Normal breath sounds. No wheezing, rhonchi or rales.   Abdominal:      General: Abdomen is flat. Bowel sounds are normal.      Palpations: Abdomen is soft.      Tenderness: There is abdominal tenderness in the right upper quadrant and epigastric area. There is no guarding. Negative signs include Puente's sign.   Musculoskeletal:         General: Normal range of motion.      Cervical back: Normal range of motion.      Lumbar back: No tenderness.      Comments: No tenderness to palpation of the lumbar spine or paraspinal regions.   Skin:     General: Skin is warm and dry.      Findings: No bruising, erythema, lesion or rash.      Comments: Several small scars from laparoscopic right partial nephrectomy are present. They are well healed and not erythematous or inflamed.   Scar on right upper back is present from liposarcoma tumor resection. No tenderness over the scar and it is not  erythematous or inflamed.    Neurological:      General: No focal deficit present.      Mental Status: She is alert. Mental status is at baseline.   Psychiatric:         Mood and Affect: Mood normal.         Behavior: Behavior normal.       Ksenia Rodriguez PA-C

## 2024-01-23 NOTE — TELEPHONE ENCOUNTER
Called Matthew and the CT is under medical dr review at 12:12PM.  Pt has commercial insurance- that's why the medical records is needed 1st- in order to obtain auth.   Can't speak to a nurse    The pt was sent home and will be notified of the status  I'll call back in another hour to check on it again.  Julieth was notified

## 2024-01-23 NOTE — ASSESSMENT & PLAN NOTE
Small, soft, oily stool for the past 4 days. Recommended she try taking Miralax for a few days since she has not had a significant bowel movement in several days. Educated the patient that I will reach out to her after the results of her lab work and imaging are completed with our next steps. Instructed the patient to go to the ER if her symptoms worsen, she develops fever, chills, or worsening abdominal pain.

## 2024-01-23 NOTE — TELEPHONE ENCOUNTER
Called Matthew after 1PM and also after 3:30PM today  Still in medical dr review    Will inform Julieth  Also informing Ksenia; that the review dr switched this to routine.     Will check again around 4:30PM today on status

## 2024-01-23 NOTE — ASSESSMENT & PLAN NOTE
S/p resection of tumor. She follows with surgical oncology. She has an MRI scheduled next week and a follow-up appointment with surgical oncology in February.

## 2024-01-24 NOTE — TELEPHONE ENCOUNTER
Message for Brandon     Checked status again with the CT;  review  Might take up to 15 calendar days for the review     I'll check tomorrow

## 2024-01-25 ENCOUNTER — TELEPHONE (OUTPATIENT)
Age: 59
End: 2024-01-25

## 2024-01-25 DIAGNOSIS — D17.71 ANGIOMYOLIPOMA OF RIGHT KIDNEY: Chronic | ICD-10-CM

## 2024-01-25 DIAGNOSIS — R10.11 RIGHT UPPER QUADRANT PAIN: Primary | ICD-10-CM

## 2024-01-25 NOTE — TELEPHONE ENCOUNTER
Message for Brandon Castro p#: [phone]    F#: [phone]    With jaylen old case for CT Abd/Pelvis with and w/o.    Ksenai put in New order today and changed it to a STAT  New case#: [id number]  Site: [facility]   Faxed clinical at 4:16PM  Under Dr Jade    Pt has this scheduled for tomorrow in the AM    I spoke to the pt and said; I don't know if this would be approved in time for her to get the CT done  She'll get a bill   I did suggest that she should cancel this until we obtain approval.     She's going to discuss with her  and see what they both decide to do.

## 2024-01-25 NOTE — TELEPHONE ENCOUNTER
From Voicemail:  [facility] Scheduling. I have a patient here last name Madyson date of birth [birth year] Medical record number [id number]. Patient is scheduling a CAT scan of the abdomen pelvis with and without contrast ordered by Ksenia. When I look at the comment, it says only PO. So the order should have been without contrast and then the comment that's correct, it should have been PO because the with contrast is only pertaining to [facility]. So if you can reach out to the patient once the order is correct or maybe it's supposed to be with [facility], give the patient a call so he knows, I mean she knows when to schedule it. Any questions, give us a call here at [facility]. Scheduling number is [phone]. Thank you. Have a good day.

## 2024-01-25 NOTE — TELEPHONE ENCOUNTER
Pt called and wants to know if she goes to the ER or gets in done routine- is she supposed to eat anything before the test.

## 2024-01-25 NOTE — TELEPHONE ENCOUNTER
Message for Brandon     Checked status of CT this AM- still pending medical dr review    Spoke with Ksenia    She'll call the pt to see how she's feeling and might be sending her to the ER

## 2024-01-26 ENCOUNTER — APPOINTMENT (OUTPATIENT)
Age: 59
End: 2024-01-26
Payer: COMMERCIAL

## 2024-01-26 ENCOUNTER — HOSPITAL ENCOUNTER (OUTPATIENT)
Dept: CT IMAGING | Facility: HOSPITAL | Age: 59
End: 2024-01-26
Payer: COMMERCIAL

## 2024-01-26 DIAGNOSIS — R19.4 CHANGE IN BOWEL HABIT: ICD-10-CM

## 2024-01-26 DIAGNOSIS — R10.11 RIGHT UPPER QUADRANT PAIN: ICD-10-CM

## 2024-01-26 DIAGNOSIS — D17.71 ANGIOMYOLIPOMA OF RIGHT KIDNEY: Chronic | ICD-10-CM

## 2024-01-26 PROCEDURE — 82710 FATS/LIPIDS FECES QUANT: CPT

## 2024-01-26 PROCEDURE — G1004 CDSM NDSC: HCPCS

## 2024-01-26 PROCEDURE — 74178 CT ABD&PLV WO CNTR FLWD CNTR: CPT

## 2024-01-26 RX ADMIN — IOHEXOL 50 ML: 240 INJECTION, SOLUTION INTRATHECAL; INTRAVASCULAR; INTRAVENOUS; ORAL at 11:52

## 2024-01-26 RX ADMIN — IOHEXOL 100 ML: 350 INJECTION, SOLUTION INTRAVENOUS at 11:52

## 2024-01-26 NOTE — TELEPHONE ENCOUNTER
Pt was left a vm about her CT being approved.  Ksenia was also informed of the approval- I spoke to her insurance. [facility] was informed that the CT was approved.   She did get it done and everything went well- spoke to her today.    She has an appt with Ksenia on Mon to discuss the CT results.     1/25 to 7/23/24  Auth#: [id number]  Site: [facility]   STAT CT Abd/Pelvis with and w/o- also had to drink barium

## 2024-01-28 ENCOUNTER — HOSPITAL ENCOUNTER (OUTPATIENT)
Dept: MRI IMAGING | Facility: HOSPITAL | Age: 59
Discharge: HOME | End: 2024-01-28
Attending: SURGERY

## 2024-01-28 DIAGNOSIS — C49.9 LIPOSARCOMA (HCC): ICD-10-CM

## 2024-01-29 ENCOUNTER — OFFICE VISIT (OUTPATIENT)
Age: 59
End: 2024-01-29
Payer: COMMERCIAL

## 2024-01-29 VITALS
SYSTOLIC BLOOD PRESSURE: 130 MMHG | WEIGHT: 119 LBS | OXYGEN SATURATION: 99 % | BODY MASS INDEX: 20.32 KG/M2 | TEMPERATURE: 97.7 F | HEIGHT: 64 IN | DIASTOLIC BLOOD PRESSURE: 80 MMHG | HEART RATE: 92 BPM

## 2024-01-29 DIAGNOSIS — J06.9 VIRAL UPPER RESPIRATORY TRACT INFECTION: ICD-10-CM

## 2024-01-29 DIAGNOSIS — T78.40XA ALLERGY, INITIAL ENCOUNTER: ICD-10-CM

## 2024-01-29 DIAGNOSIS — R19.4 CHANGE IN BOWEL HABIT: Primary | ICD-10-CM

## 2024-01-29 PROCEDURE — 99214 OFFICE O/P EST MOD 30 MIN: CPT

## 2024-01-29 NOTE — ASSESSMENT & PLAN NOTE
Patient states that her allergies do not seem to responding to over-the-counter loratadine (Claritin) or Flonase much anymore.  I recommended that she could try switching to another over-the-counter antihistamine such as cetirizine to see if this helps. She said that she has had allergy testing in the past, but has not pursued other allergy treatments.  A referral to an allergy specialist has been placed.   4

## 2024-01-29 NOTE — ASSESSMENT & PLAN NOTE
This is a follow-up exam from last week for the patient's intermittent right upper quadrant abdominal pain and change in bowel habits.  CT scan of the abdomen done last week showed a small cyst adjacent to the gallbladder but was otherwise largely unremarkable.  Patient is still experiencing frequent, small bowel movements and some abdominal pain.  Recommended follow-up with GI.  Recommended the patient go to the ER if she experiences fever, chills, or worsening pain.

## 2024-01-29 NOTE — ASSESSMENT & PLAN NOTE
Patient has symptoms of cough, sore throat, and ear pressure for the past 3 days.  Patient instructed to use Flonase nasal spray, cough drops, and over-the-counter cough and cold medications such as Robitussin or Mucinex as needed for relief of her symptoms.  Patient instructed to follow-up if her symptoms do not improve.

## 2024-01-29 NOTE — PROGRESS NOTES
Name: Salazar Coughlin      : 1965      MRN: [id number]  Encounter Provider: Ksenia Rodriguez PA-C  Encounter Date: 2024   Encounter department: [facility]    Assessment & Plan     1. Change in bowel habit  Assessment & Plan:  This is a follow-up exam from last week for the patient's intermittent right upper quadrant abdominal pain and change in bowel habits.  CT scan of the abdomen done last week showed a small cyst adjacent to the gallbladder but was otherwise largely unremarkable.  Patient is still experiencing frequent, small bowel movements and some abdominal pain.  Recommended follow-up with GI.  Recommended the patient go to the ER if she experiences fever, chills, or worsening pain.    Orders:  -     Ambulatory Referral to Gastroenterology; Future    2. Viral upper respiratory tract infection  Assessment & Plan:  Patient has symptoms of cough, sore throat, and ear pressure for the past 3 days.  Patient instructed to use Flonase nasal spray, cough drops, and over-the-counter cough and cold medications such as Robitussin or Mucinex as needed for relief of her symptoms.  Patient instructed to follow-up if her symptoms do not improve.      3. Allergy, initial encounter  Assessment & Plan:  Patient states that her allergies do not seem to responding to over-the-counter loratadine (Claritin) or Flonase much anymore.  I recommended that she could try switching to another over-the-counter antihistamine such as cetirizine to see if this helps. She said that she has had allergy testing in the past, but has not pursued other allergy treatments.  A referral to an allergy specialist has been placed.    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  The results of her CT scan were largely unremarkable and were discussed with the patient.  However, given her continued intermittent right upper quadrant pain and irregular bowel movements, I recommended follow-up with GI.  She states that her bowel movements are soft, but very small in quantity, and sometimes she has 3 bowel movements in the morning, which is abnormal for her.  She denies fever, chills, diarrhea or constipation, or blood in her stool.  She has been experiencing sore throat, cough, ear pressure and ear popping for the past few days.  She has tried taking NyQuil for her symptoms with some relief.  She states that she also frequently has allergies with an itchy throat and congestion, which does not seem to be responding to over-the-counter Flonase and Claritin medications anymore.    Review of Systems   Constitutional:  Negative for chills and fever.   HENT:  Positive for sore throat. Negative for congestion.    Eyes:  Negative for pain and visual disturbance.   Respiratory:  Positive for cough. Negative for shortness of breath.    Cardiovascular:  Negative for chest pain and palpitations.   Gastrointestinal:  Positive for abdominal pain (intermittent RUQ). Negative for constipation and diarrhea.   Genitourinary:  Negative for dysuria and hematuria.   Musculoskeletal:  Negative for arthralgias and myalgias.   Skin:  Negative for color change and rash.   Neurological:  Negative for dizziness and headaches.       Current Outpatient Medications on File Prior to Visit   Medication Sig   • alendronate (FOSAMAX) 70 mg tablet TAKE 1 TABLET (70 MG TOTAL) BY MOUTH EVERY 7 DAYS   • fluticasone (FLONASE) 50 mcg/act nasal spray 1 spray into each nostril as needed for rhinitis   • latanoprost (XALATAN) 0.005 % ophthalmic solution Administer 1 drop to both eyes daily at bedtime     • multivitamin (THERAGRAN) TABS Take 1  "tablet by mouth daily       Objective     /80 (BP Location: Right arm, Patient Position: Sitting, Cuff Size: Standard)   Pulse 92   Temp 97.7 °F (36.5 °C) (Tympanic)   Ht 5' 4\" (1.626 m)   Wt 54 kg (119 lb)   SpO2 99%   BMI 20.43 kg/m²     Physical Exam  Constitutional:       Appearance: Normal appearance.   HENT:      Head: Normocephalic and atraumatic.      Right Ear: Tympanic membrane normal.      Left Ear: Tympanic membrane normal.      Nose: Nose normal.      Mouth/Throat:      Mouth: Mucous membranes are moist.      Pharynx: Posterior oropharyngeal erythema present. No oropharyngeal exudate.   Eyes:      Conjunctiva/sclera: Conjunctivae normal.   Cardiovascular:      Rate and Rhythm: Normal rate and regular rhythm.      Pulses: Normal pulses.      Heart sounds: Normal heart sounds. No murmur heard.  Pulmonary:      Effort: Pulmonary effort is normal.      Breath sounds: Normal breath sounds. No wheezing or rales.   Abdominal:      Palpations: Abdomen is soft.   Musculoskeletal:         General: Normal range of motion.      Cervical back: Normal range of motion.   Skin:     General: Skin is warm and dry.   Neurological:      General: No focal deficit present.      Mental Status: She is alert. Mental status is at baseline.   Psychiatric:         Mood and Affect: Mood normal.         Behavior: Behavior normal.     Ksenia Rodriguez PA-C    "

## 2024-01-30 ENCOUNTER — HOSPITAL ENCOUNTER (OUTPATIENT)
Dept: MRI IMAGING | Facility: HOSPITAL | Age: 59
Discharge: HOME | End: 2024-01-30

## 2024-01-30 DIAGNOSIS — C49.9 LIPOSARCOMA (HCC): ICD-10-CM

## 2024-01-30 LAB
FAT 24H STL-MRATE: 9.5 G/24 HR (ref 0–7.1)
SPECIMEN WT 72H STL-MRATE: 440 G

## 2024-02-05 ENCOUNTER — HOSPITAL ENCOUNTER (OUTPATIENT)
Dept: MRI IMAGING | Facility: HOSPITAL | Age: 59
Discharge: HOME | End: 2024-02-05
Attending: SURGERY
Payer: COMMERCIAL

## 2024-02-05 DIAGNOSIS — C49.9 LIPOSARCOMA (HCC): ICD-10-CM

## 2024-02-05 PROCEDURE — 71552 MRI CHEST W/O & W/DYE: CPT

## 2024-02-05 PROCEDURE — A9585 GADOBUTROL INJECTION: HCPCS | Performed by: INTERNAL MEDICINE

## 2024-02-05 RX ORDER — GADOBUTROL 604.72 MG/ML
5 INJECTION INTRAVENOUS
Status: COMPLETED | OUTPATIENT
Start: 2024-02-05 | End: 2024-02-05

## 2024-02-05 RX ADMIN — GADOBUTROL 5 ML: 604.72 INJECTION INTRAVENOUS at 22:54

## 2024-02-07 ENCOUNTER — CONSULT (OUTPATIENT)
Dept: GASTROENTEROLOGY | Facility: CLINIC | Age: 59
End: 2024-02-07
Payer: COMMERCIAL

## 2024-02-07 VITALS
HEIGHT: 64 IN | SYSTOLIC BLOOD PRESSURE: 122 MMHG | DIASTOLIC BLOOD PRESSURE: 70 MMHG | BODY MASS INDEX: 20.04 KG/M2 | OXYGEN SATURATION: 98 % | WEIGHT: 117.4 LBS | HEART RATE: 83 BPM

## 2024-02-07 DIAGNOSIS — R63.4 WEIGHT LOSS: ICD-10-CM

## 2024-02-07 DIAGNOSIS — R19.4 CHANGE IN BOWEL HABIT: ICD-10-CM

## 2024-02-07 DIAGNOSIS — K86.81 EXOCRINE PANCREATIC INSUFFICIENCY: Primary | ICD-10-CM

## 2024-02-07 PROCEDURE — 99213 OFFICE O/P EST LOW 20 MIN: CPT | Performed by: PHYSICIAN ASSISTANT

## 2024-02-07 NOTE — PROGRESS NOTES
[facility] - Outpatient Consultation  Salazar Coughlin 58 y.o. female MRN: [id number]  Encounter: [id number]          ASSESSMENT AND PLAN:      1. Change in bowel habit  2. Exocrine pancreatic insufficiency  3. Weight loss  She notes 2 weeks of a change in her bowels - loose/small/thin and mild migratory abdominal pain  She is most concerned about a recent 7lb weight loss  Her PCP noted her lipase was <6 and so ordered a 72hr fecal fat which was high  Her pancreas appears normal on CT imaging  Agreed to start PERT    She has no risk factors for EPI    If ongoing symptoms at next appt will plan EGD and colonoscopy    ______________________________________________________________________    HPI: 58-year-old female with a history of a liposarcoma of the back and an angiomyolipoma of the kidney who presents for evaluation of change in bowel habits, abdominal pain and weight loss.  She reports that the symptoms began approximately 2 weeks ago.  She first noted loose stools and since then have noted loose, small and more frequent stools.  She has also noted migratory abdominal pains which are mild.  She admits that between November and December she was up visiting [place].  She reports that she did not get sick while she was there.  She reports that she was very careful with her eating and so does not believe that that has anything to do with her symptoms.  She weighed herself recently and noted that she had lost 7 pounds since she returned from [place].  She is understandably concerned about this since she has always struggled to keep weight on.  There has been no nausea or vomiting.  She has had no rectal bleeding.  Her family doctor did labs and stool testing noting an undetectable lipase level and an elevated fecal fat.  He did a CT of the abdomen pelvis with and without contrast which showed an unremarkable pancreas.  She has no risk factors for EPI including alcoholism, diabetes,  "medications.  Her last colonoscopy was in 2016 with Dr. Velazquez and she remembers it to be a normal exam.       REVIEW OF SYSTEMS:    CONSTITUTIONAL: Denies any fever, chills, rigors, and weight loss.  HEENT: No earache or tinnitus. Denies hearing loss or visual disturbances.  CARDIOVASCULAR: No chest pain or palpitations.   RESPIRATORY: Denies any cough, hemoptysis, shortness of breath or dyspnea on exertion.  GASTROINTESTINAL: As noted in the History of Present Illness.   GENITOURINARY: No problems with urination. Denies any hematuria or dysuria.  NEUROLOGIC: No dizziness or vertigo, denies headaches.   MUSCULOSKELETAL: Denies any muscle or joint pain.   SKIN: Denies skin rashes or itching.   ENDOCRINE: Denies excessive thirst. Denies intolerance to heat or cold.  PSYCHOSOCIAL: Denies depression or anxiety. Denies any recent memory loss.       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 Removed 11/04/2021  Last Assessment & Plan:  Formatting of this note might be different from the original. S/p excision of intramuscular large right upper back mass on 11/4/21.  Reviewed her surgical pathology result with the patient.  And then, we discussed a need for additional workup such CT chest/abdomen/and pelvis to make sure that    Cancer (HCC)     4/15/22 Pt reports right kidney cancer     GERD (gastroesophageal reflux disease)     4/15/22 Pt reports on occasion - takes no medications -reports using TUMS as needed    Glaucoma     History of HPV infection     Hyperlipidemia     4/15/22 Pt reports cholesterol is borderline - on no medications at this time    Renal lesion     Seasonal allergies     4/15/22 Pt reports has shortness of breath only with seasonal allergies \"tightness with breathing reported with allergies \"    Subarachnoid hemorrhage (HCC) 10/30/2014    Telangiectasias 02/04/2021    " "Vasomotor rhinitis 02/05/2021    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     Past Surgical History:   Procedure Laterality Date    CERVIX SURGERY      Cervical cyst removed / also for HPV    COLONOSCOPY      DILATION AND CURETTAGE OF UTERUS      EGD      KIDNEY SURGERY Right     [place] LAPAROSCOPY SURG PARTIAL NEPHRECTOMY Right 05/02/2022    Procedure: ROBOTIC LAPAROSCOPIC PARTIAL NEPHRECTOMY, INTRAOPERATIVE ULTRASOUND WITH INTERPRETATION;  Surgeon: Michele Ramos MD;  [facility];  Service: Urology    SOFT TISSUE MASS EXCISION  11/2021    liposarcoma    UTERINE FIBROID SURGERY      4/15/22 Pt reports had fibroid removed - indicated cervical area      Social History   Social History     Substance and Sexual Activity   Alcohol Use Never    Comment: Denies any former or current use of alcohol      Social History     Substance and Sexual Activity   Drug Use Never    Comment: Denies any former or current drug use      Social History     Tobacco Use   Smoking Status Never   Smokeless Tobacco Never   Tobacco Comments    Denies any former or current use      Family History   Problem Relation Age of Onset    No Known Problems Mother     Alzheimer's disease Father     No Known Problems Sister     No Known Problems Daughter        Meds/Allergies       Current Outpatient Medications:     alendronate (FOSAMAX) 70 mg tablet    fluticasone (FLONASE) 50 mcg/act nasal spray    latanoprost (XALATAN) 0.005 % ophthalmic solution    pancrelipase, Lip-Prot-Amyl, (CREON) 24,000 units    Allergies   Allergen Reactions    Other Sneezing     Cats, dogs, seasonal - pt reports hard to breath and coughing with these allergies            Objective     Blood pressure 122/70, pulse 83, height 5' 4\" (1.626 m), weight 53.3 kg (117 lb 6.4 oz), SpO2 98%. Body mass index is 20.15 kg/m².        PHYSICAL EXAM:      General Appearance:   Alert, cooperative, no distress   HEENT:   Normocephalic, atraumatic, anicteric.   "   Neck:  Supple, symmetrical, trachea midline   Lungs:   Clear to auscultation bilaterally; no rales, rhonchi or wheezing; respirations unlabored    Heart::   Regular rate and rhythm; no murmur, rub, or gallop.   Abdomen:   Soft, non-tender, non-distended; normal bowel sounds; no masses, no organomegaly    Genitalia:   Deferred    Rectal:   Deferred    Extremities:  No cyanosis, clubbing or edema    Pulses:  2+ and symmetric    Skin:  No jaundice, rashes, or lesions    Lymph nodes:  No palpable cervical lymphadenopathy        Lab Results:   No visits with results within 1 Day(s) from this visit.   Latest known visit with results is:   Appointment on 01/26/2024   Component Date Value    Fat,(Fecal Lipids)Qn 01/26/2024 9.5 (H)     Stool Weight 01/26/2024 440          Radiology Results:   CT abdomen pelvis w wo contrast    Result Date: 1/26/2024  Narrative: CT ABDOMEN AND PELVIS WITHOUT AND WITH IV CONTRAST INDICATION:   D17.71: Benign lipomatous neoplasm of kidney R10.11: Right upper quadrant pain. Per Hematology Oncology clinical notes, history of resection of right upper back intramuscular mass 11/2021. Pathology positive for atypical lipomatous tumor. History of right partial nephrectomy 5/2/2022 for angiomyolipoma. COMPARISON: 10/7/2022 MR. 11/30/2021 CT from [facility] TECHNIQUE:  CT examination of the abdomen and pelvis was performed.   Axial, sagittal, and coronal 2D reformatted images were created from the source data and submitted for interpretation. Radiation dose length product (DLP) for this visit:  1994 mGy-cm .  This examination, like all CT scans performed in the [facility], was performed utilizing techniques to minimize radiation dose exposure, including the use of iterative reconstruction and automated exposure control. Triple phase contrast-enhanced technique used. IV Contrast:  50 mL of iohexol (OMNIPAQUE) 100 mL of iohexol (OMNIPAQUE) Enteric Contrast:  Enteric  contrast was administered. FINDINGS: ABDOMEN LOWER CHEST: Dependent atelectasis. LIVER/BILIARY TREE: Chronic, tiny cyst adjacent to the gallbladder fossa. Otherwise normal morphology, enhancement and bile duct caliber. GALLBLADDER:  Within normal limits. SPLEEN:  Within normal limits. PANCREAS:  Within normal limits. ADRENAL GLANDS:  Within normal limits. KIDNEYS/URETERS: Chronic, tiny cortical cysts. Similar small right lower pole cortical scar from partial nephrectomy. No recurrent or new suspicious mass. Normal renal enhancement. Normal appearance of the collecting systems. STOMACH AND BOWEL:  Within normal limits. APPENDIX:  Within normal limits. ABDOMINOPELVIC CAVITY:  No abnormal air, fluid or enlarged lymph nodes. VESSELS: Within normal limits. PELVIS: REPRODUCTIVE ORGANS: Similar enlarged, fibroid uterus. No adnexal masses. URINARY BLADDER:  Within normal limits. ABDOMINAL WALL/INGUINAL REGIONS: Normal appearance. No soft tissue masses. BONES: Mild degenerative changes.     Impression: No evidence of recurrent or new renal or soft tissue masses. Workstation performed: [id number]

## 2024-02-12 ENCOUNTER — OFFICE VISIT (OUTPATIENT)
Dept: SURGICAL ONCOLOGY | Facility: CLINIC | Age: 59
End: 2024-02-12
Payer: COMMERCIAL

## 2024-02-12 VITALS
HEART RATE: 103 BPM | BODY MASS INDEX: 19.81 KG/M2 | TEMPERATURE: 97.5 F | HEIGHT: 64 IN | SYSTOLIC BLOOD PRESSURE: 118 MMHG | RESPIRATION RATE: 20 BRPM | DIASTOLIC BLOOD PRESSURE: 74 MMHG | OXYGEN SATURATION: 97 % | WEIGHT: 116 LBS

## 2024-02-12 DIAGNOSIS — Z85.831 ENCOUNTER FOR FOLLOW-UP SURVEILLANCE OF SARCOMA: Primary | ICD-10-CM

## 2024-02-12 DIAGNOSIS — Z85.831 HISTORY OF SARCOMA: ICD-10-CM

## 2024-02-12 DIAGNOSIS — Z08 ENCOUNTER FOR FOLLOW-UP SURVEILLANCE OF SARCOMA: Primary | ICD-10-CM

## 2024-02-12 PROCEDURE — 99214 OFFICE O/P EST MOD 30 MIN: CPT

## 2024-02-12 NOTE — PROGRESS NOTES
Surgical Oncology Follow Up       [facility]  [facility]  [address]  [address]  [place] [address]  [phone]    Salazar Coughlin  1965  [id number]  [facility]  [facility]  [address]  [address]  [place] [address]  [phone]    Diagnoses and all orders for this visit:    Encounter for follow-up surveillance of sarcoma    History of sarcoma  -     MRI chest mediastinum wo and w contrast; Future  -     BUN; Future  -     Creatinine, serum; Future        Chief Complaint   Patient presents with    Follow-up       Return in about 6 months (around 8/12/2024) for Office visit with Dr Phillips, Imaging - See orders, Labs - See Treatment Plan.      Staging: Well-differentiated liposarcoma of right back  Treatment history: Excisional biopsy, November 2021 (performed by Dr Hudson, [facility])  Current treatment: Observation     Disease status: FLORECITA    History of Present Illness: The patient returns to the office today in follow-up for her history of a well-differentiated liposarcoma, excised at an outside facility in November 2021.  She also had a right renal angiomyolipoma removed in May 2022.  She is currently FLORECITA at over 2 years, and denies any new lumps or masses.  She has been experiencing loose stools and weight loss as well as right abdominal cramping.  Lab testing ordered by her PCP demonstrated insufficient lipase levels and high fecal fat.  She consulted with GI last week, and was started on pancreatic enzymes.  She notes that her stools have improved and she feels that the enzymes are working, but she is still concerned about the weight loss.  CT of the abdomen and pelvis was performed on January 26, and MRI of the thorax was performed on February 5.  I have reviewed these results myself and discussed them with the patient today.      Review  of Systems   Constitutional:  Positive for unexpected weight change. Negative for activity change, appetite change and fatigue.   Respiratory: Negative.  Negative for cough and shortness of breath.    Cardiovascular: Negative.    Gastrointestinal:  Positive for abdominal pain (occasional, right). Negative for abdominal distention, diarrhea, nausea and vomiting.   Musculoskeletal: Negative.    Skin: Negative.  Negative for color change.   Neurological: Negative.  Negative for dizziness and headaches.   Hematological: Negative.  Negative for adenopathy.   Psychiatric/Behavioral: Negative.               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 Removed 11/04/2021  Last Assessment & Plan:  Formatting of this note might be different from the original. S/p excision of intramuscular large right upper back mass on 11/4/21.  Reviewed her surgical pathology result with the patient.  And then, we discussed a need for additional workup such CT chest/abdomen/and pelvis to make sure that    Cancer (HCC)     4/15/22 Pt reports right kidney cancer     GERD (gastroesophageal reflux disease)     4/15/22 Pt reports on occasion - takes no medications -reports using TUMS as needed    Glaucoma     History of HPV infection     Hyperlipidemia     4/15/22 Pt reports cholesterol is borderline - on no medications at this time    Renal lesion     Seasonal allergies     4/15/22 Pt reports has shortness of breath  "only with seasonal allergies \"tightness with breathing reported with allergies \"    Subarachnoid hemorrhage (HCC) 10/30/2014    Telangiectasias 02/04/2021    Vasomotor rhinitis 02/05/2021    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     Past Surgical History:   Procedure Laterality Date    CERVIX SURGERY      Cervical cyst removed / also for HPV    COLONOSCOPY      DILATION AND CURETTAGE OF UTERUS      EGD      KIDNEY SURGERY Right     [place] LAPAROSCOPY SURG PARTIAL NEPHRECTOMY Right 05/02/2022    Procedure: ROBOTIC LAPAROSCOPIC PARTIAL NEPHRECTOMY, INTRAOPERATIVE ULTRASOUND WITH INTERPRETATION;  Surgeon: Michele Ramos MD;  [facility];  Service: Urology    SOFT TISSUE MASS EXCISION  11/2021    liposarcoma    UTERINE FIBROID SURGERY      4/15/22 Pt reports had fibroid removed - indicated cervical area      Family History   Problem Relation Age of Onset    No Known Problems Mother     Alzheimer's disease Father     No Known Problems Sister     No Known Problems Daughter      Social History     Socioeconomic History    Marital status: /Civil Union     Spouse name: Not on file    Number of children: Not on file    Years of education: Not on file    Highest education level: Not on file   Occupational History    Not on file   Tobacco Use    Smoking status: Never    Smokeless tobacco: Never    Tobacco comments:     Denies any former or current use    Vaping Use    Vaping status: Never Used   Substance and Sexual Activity    Alcohol use: Never     Comment: Denies any former or current use of alcohol     Drug use: Never     Comment: Denies any former or current drug use     Sexual activity: Not Currently     Comment: Reports not active at this time   Other Topics Concern    Not on file   Social History Narrative    Not on file     Social Determinants of Health     Financial Resource Strain: Not on file   Food Insecurity: Not on file   Transportation Needs: Not on file   Physical Activity: " Inactive (1/26/2021)    Exercise Vital Sign     Days of Exercise per Week: 0 days     Minutes of Exercise per Session: 0 min   Stress: No Stress Concern Present (1/26/2021)    [employer] - Occupational Stress Questionnaire     Feeling of Stress : Only a little   Social Connections: Not on file   Intimate Partner Violence: Not on file   Housing Stability: Not on file       Current Outpatient Medications:     alendronate (FOSAMAX) 70 mg tablet, TAKE 1 TABLET (70 MG TOTAL) BY MOUTH EVERY 7 DAYS, Disp: 12 tablet, Rfl: 3    fluticasone (FLONASE) 50 mcg/act nasal spray, 1 spray into each nostril as needed for rhinitis, Disp: , Rfl:     latanoprost (XALATAN) 0.005 % ophthalmic solution, Administer 1 drop to both eyes daily at bedtime  , Disp: , Rfl:     pancrelipase, Lip-Prot-Amyl, (CREON) 24,000 units, Take 24,000 units of lipase by mouth 3 (three) times a day with meals, Disp: 90 capsule, Rfl: 3  Allergies   Allergen Reactions    Other Sneezing     Cats, dogs, seasonal - pt reports hard to breath and coughing with these allergies      Vitals:    02/12/24 1102   BP: 118/74   Pulse: 103   Resp: 20   Temp: 97.5 °F (36.4 °C)   SpO2: 97%       Physical Exam  Vitals reviewed.   Constitutional:       General: She is not in acute distress.     Appearance: Normal appearance. She is normal weight. She is not ill-appearing or toxic-appearing.   HENT:      Head: Normocephalic and atraumatic.      Nose: Nose normal.      Mouth/Throat:      Mouth: Mucous membranes are moist.   Eyes:      General: No scleral icterus.     Conjunctiva/sclera: Conjunctivae normal.   Cardiovascular:      Rate and Rhythm: Normal rate.   Pulmonary:      Effort: Pulmonary effort is normal.   Abdominal:      General: Abdomen is flat.      Palpations: Abdomen is soft.   Musculoskeletal:         General: Normal range of motion.      Cervical back: Normal range of motion and neck supple.   Skin:     General: Skin is warm and dry.       Coloration: Skin is not jaundiced.             Comments: Surgical scar is smooth, without nodularity or evidence of underlying mass.   Neurological:      General: No focal deficit present.      Mental Status: She is alert and oriented to person, place, and time.   Psychiatric:         Mood and Affect: Mood normal.         Behavior: Behavior normal.         Thought Content: Thought content normal.         Judgment: Judgment normal.               Imaging  MRI chest mediastinum wo and w contrast    Result Date: 2/9/2024  Narrative: MRI OF THE CHEST WITH AND WITHOUT CONTRAST INDICATION: History of resection of atypical lipomatous tumor right upper back. TECHNIQUE: Multiplanar multisequence examination of the chest without and with IV contrast. IV Contrast:  5 mL of Gadobutrol injection (SINGLE-DOSE) COMPARISON:  7/15/23 MR and 11/30/21 CT, both from [facility] FINDINGS: LUNGS:  Mild dependent atelectasis.  No suspicious opacities. PLEURA:  Within normal limits. HEART/GREAT VESSELS:  Within normal limits. MEDIASTINUM AND [place]STON:  Within normal limits. CHEST WALL AND LOWER NECK:   No masses, suspicious enhancement or lymphadenopathy. VISUALIZED STRUCTURES IN THE UPPER ABDOMEN:  Small hepatic and right renal cysts. BONES:  Within normal limits.     Impression: No recurrent mass or evidence of metastatic disease. Workstation performed: [id number]     CT abdomen pelvis w wo contrast    Result Date: 1/26/2024  Narrative: CT ABDOMEN AND PELVIS WITHOUT AND WITH IV CONTRAST INDICATION:   D17.71: Benign lipomatous neoplasm of kidney R10.11: Right upper quadrant pain. Per Hematology Oncology clinical notes, history of resection of right upper back intramuscular mass 11/2021. Pathology positive for atypical lipomatous tumor. History of right partial nephrectomy 5/2/2022 for angiomyolipoma. COMPARISON: 10/7/2022 MR. 11/30/2021 CT from [facility] TECHNIQUE:  CT examination of the abdomen and pelvis  was performed.   Axial, sagittal, and coronal 2D reformatted images were created from the source data and submitted for interpretation. Radiation dose length product (DLP) for this visit:  1994 mGy-cm .  This examination, like all CT scans performed in the [facility], was performed utilizing techniques to minimize radiation dose exposure, including the use of iterative reconstruction and automated exposure control. Triple phase contrast-enhanced technique used. IV Contrast:  50 mL of iohexol (OMNIPAQUE) 100 mL of iohexol (OMNIPAQUE) Enteric Contrast:  Enteric contrast was administered. FINDINGS: ABDOMEN LOWER CHEST: Dependent atelectasis. LIVER/BILIARY TREE: Chronic, tiny cyst adjacent to the gallbladder fossa. Otherwise normal morphology, enhancement and bile duct caliber. GALLBLADDER:  Within normal limits. SPLEEN:  Within normal limits. PANCREAS:  Within normal limits. ADRENAL GLANDS:  Within normal limits. KIDNEYS/URETERS: Chronic, tiny cortical cysts. Similar small right lower pole cortical scar from partial nephrectomy. No recurrent or new suspicious mass. Normal renal enhancement. Normal appearance of the collecting systems. STOMACH AND BOWEL:  Within normal limits. APPENDIX:  Within normal limits. ABDOMINOPELVIC CAVITY:  No abnormal air, fluid or enlarged lymph nodes. VESSELS: Within normal limits. PELVIS: REPRODUCTIVE ORGANS: Similar enlarged, fibroid uterus. No adnexal masses. URINARY BLADDER:  Within normal limits. ABDOMINAL WALL/INGUINAL REGIONS: Normal appearance. No soft tissue masses. BONES: Mild degenerative changes.     Impression: No evidence of recurrent or new renal or soft tissue masses. Workstation performed: [id number]     I personally reviewed and interpreted the above laboratory and imaging data.    Discussion/Summary: This aida  57 y/o female who presents today for sarcoma surveillance.  At this time there is no evidence of disease recurrence by imaging or physical exam. We  will plan to repeat MRI in 6 months and we will see her again at that time for another clinical exam.  With regard to her new GI symptoms, CT did not show any abnormalities.  I will review her MRI imaging with Dr Phillips, specifically to see if there are any noticeable changes within the pancreas.  I will contact the patient with the outcome of this discussion.  Should she continue to notice weight loss and stool issues while taking the digestive enzymes, I recommend she have a colonoscopy performed. She is agreeable to the plan, all questions have been answered.

## 2024-02-12 NOTE — LETTER
February 12, 2024     Heri Phillips MD  [address]  2nd Floor  [facility] [address]    Patient: Salazar Coughlin   YOB: 1965   Date of Visit: 2/12/2024       Dear Dr. Phillips:    Thank you for referring Salazar Coughlin to me for evaluation. Below are my notes for this consultation.    If you have questions, please do not hesitate to call me. I look forward to following your patient along with you.         Sincerely,        KRISTEN Dean        CC: No Recipients    KRISTEN Dean  2/12/2024 12:12 PM  Sign when Signing Visit               Surgical Oncology Follow Up       [facility] [place]  [address]  [address]  [facility] [place] [address]  [phone]    Salazar Coughlin  1965  [id number]  [facility] [place]  [address]  [address]  [facility] [address]  [phone]    Diagnoses and all orders for this visit:    Encounter for follow-up surveillance of sarcoma    History of sarcoma  -     MRI chest mediastinum wo and w contrast; Future  -     BUN; Future  -     Creatinine, serum; Future        Chief Complaint   Patient presents with   • Follow-up       Return in about 6 months (around 8/12/2024) for Office visit with Dr Phillips, Imaging - See orders, Labs - See Treatment Plan.      Staging: Well-differentiated liposarcoma of right back  Treatment history: Excisional biopsy, November 2021 ([facility]  Current treatment: Observation     Disease status: FLORECITA    History of Present Illness: The patient returns to the office today in follow-up for her history of a well-differentiated liposarcoma, excised at an outside facility in November 2021.  She also had a right renal angiomyolipoma removed in May 2022.  She is currently FLORECITA at over 2 years, and denies any new lumps or masses.  She has been  experiencing loose stools and weight loss as well as right abdominal cramping.  Lab testing ordered by her PCP demonstrated insufficient lipase levels and high fecal fat.  She consulted with GI last week, and was started on pancreatic enzymes.  She notes that her stools have improved and she feels that the enzymes are working, but she is still concerned about the weight loss.  CT of the abdomen and pelvis was performed on January 26, and MRI of the thorax was performed on February 5.  I have reviewed these results myself and discussed them with the patient today.      Review of Systems   Constitutional:  Positive for unexpected weight change. Negative for activity change, appetite change and fatigue.   Respiratory: Negative.  Negative for cough and shortness of breath.    Cardiovascular: Negative.    Gastrointestinal:  Positive for abdominal pain (occasional, right). Negative for abdominal distention, diarrhea, nausea and vomiting.   Musculoskeletal: Negative.    Skin: Negative.  Negative for color change.   Neurological: Negative.  Negative for dizziness and headaches.   Hematological: Negative.  Negative for adenopathy.   Psychiatric/Behavioral: Negative.               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 Removed 11/04/2021  Last Assessment & Plan:  Formatting of this note might be different from the original. S/p excision of intramuscular large right upper back  "mass on 11/4/21.  Reviewed her surgical pathology result with the patient.  And then, we discussed a need for additional workup such CT chest/abdomen/and pelvis to make sure that   • Cancer (HCC)     4/15/22 Pt reports right kidney cancer    • GERD (gastroesophageal reflux disease)     4/15/22 Pt reports on occasion - takes no medications -reports using TUMS as needed   • Glaucoma    • History of HPV infection    • Hyperlipidemia     4/15/22 Pt reports cholesterol is borderline - on no medications at this time   • Renal lesion    • Seasonal allergies     4/15/22 Pt reports has shortness of breath only with seasonal allergies \"tightness with breathing reported with allergies \"   • Subarachnoid hemorrhage (HCC) 10/30/2014   • Telangiectasias 02/04/2021   • Vasomotor rhinitis 02/05/2021   • 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     Past Surgical History:   Procedure Laterality Date   • CERVIX SURGERY      Cervical cyst removed / also for HPV   • COLONOSCOPY     • DILATION AND CURETTAGE OF UTERUS     • EGD     • KIDNEY SURGERY Right    • NC LAPAROSCOPY SURG PARTIAL NEPHRECTOMY Right 05/02/2022    Procedure: ROBOTIC LAPAROSCOPIC PARTIAL NEPHRECTOMY, INTRAOPERATIVE ULTRASOUND WITH INTERPRETATION;  Surgeon: Michele Ramos MD;  [facility];  Service: Urology   • SOFT TISSUE MASS EXCISION  11/2021    liposarcoma   • UTERINE FIBROID SURGERY      4/15/22 Pt reports had fibroid removed - indicated cervical area      Family History   Problem Relation Age of Onset   • No Known Problems Mother    • Alzheimer's disease Father    • No Known Problems Sister    • No Known Problems Daughter      Social History     Socioeconomic History   • Marital status: /Civil Union     Spouse name: Not on file   • Number of children: Not on file   • Years of education: Not on file   • Highest education level: Not on file   Occupational History   • Not on file   Tobacco Use   • Smoking status: Never   • " Smokeless tobacco: Never   • Tobacco comments:     Denies any former or current use    Vaping Use   • Vaping status: Never Used   Substance and Sexual Activity   • Alcohol use: Never     Comment: Denies any former or current use of alcohol    • Drug use: Never     Comment: Denies any former or current drug use    • Sexual activity: Not Currently     Comment: Reports not active at this time   Other Topics Concern   • Not on file   Social History Narrative   • Not on file     Social Determinants of Health     Financial Resource Strain: Not on file   Food Insecurity: Not on file   Transportation Needs: Not on file   Physical Activity: Inactive (1/26/2021)    Exercise Vital Sign    • Days of Exercise per Week: 0 days    • Minutes of Exercise per Session: 0 min   Stress: No Stress Concern Present (1/26/2021)    [employer] - Occupational Stress Questionnaire    • Feeling of Stress : Only a little   Social Connections: Not on file   Intimate Partner Violence: Not on file   Housing Stability: Not on file       Current Outpatient Medications:   •  alendronate (FOSAMAX) 70 mg tablet, TAKE 1 TABLET (70 MG TOTAL) BY MOUTH EVERY 7 DAYS, Disp: 12 tablet, Rfl: 3  •  fluticasone (FLONASE) 50 mcg/act nasal spray, 1 spray into each nostril as needed for rhinitis, Disp: , Rfl:   •  latanoprost (XALATAN) 0.005 % ophthalmic solution, Administer 1 drop to both eyes daily at bedtime  , Disp: , Rfl:   •  pancrelipase, Lip-Prot-Amyl, (CREON) 24,000 units, Take 24,000 units of lipase by mouth 3 (three) times a day with meals, Disp: 90 capsule, Rfl: 3  Allergies   Allergen Reactions   • Other Sneezing     Cats, dogs, seasonal - pt reports hard to breath and coughing with these allergies      Vitals:    02/12/24 1102   BP: 118/74   Pulse: 103   Resp: 20   Temp: 97.5 °F (36.4 °C)   SpO2: 97%       Physical Exam  Vitals reviewed.   Constitutional:       General: She is not in acute distress.     Appearance: Normal  appearance. She is normal weight. She is not ill-appearing or toxic-appearing.   HENT:      Head: Normocephalic and atraumatic.      Nose: Nose normal.      Mouth/Throat:      Mouth: Mucous membranes are moist.   Eyes:      General: No scleral icterus.     Conjunctiva/sclera: Conjunctivae normal.   Cardiovascular:      Rate and Rhythm: Normal rate.   Pulmonary:      Effort: Pulmonary effort is normal.   Abdominal:      General: Abdomen is flat.      Palpations: Abdomen is soft.   Musculoskeletal:         General: Normal range of motion.      Cervical back: Normal range of motion and neck supple.   Skin:     General: Skin is warm and dry.      Coloration: Skin is not jaundiced.             Comments: Surgical scar is smooth, without nodularity or evidence of underlying mass.   Neurological:      General: No focal deficit present.      Mental Status: She is alert and oriented to person, place, and time.   Psychiatric:         Mood and Affect: Mood normal.         Behavior: Behavior normal.         Thought Content: Thought content normal.         Judgment: Judgment normal.               Imaging  MRI chest mediastinum wo and w contrast    Result Date: 2/9/2024  Narrative: MRI OF THE CHEST WITH AND WITHOUT CONTRAST INDICATION: History of resection of atypical lipomatous tumor right upper back. TECHNIQUE: Multiplanar multisequence examination of the chest without and with IV contrast. IV Contrast:  5 mL of Gadobutrol injection (SINGLE-DOSE) COMPARISON:  7/15/23 MR and 11/30/21 CT, both from [facility] FINDINGS: LUNGS:  Mild dependent atelectasis.  No suspicious opacities. PLEURA:  Within normal limits. HEART/GREAT VESSELS:  Within normal limits. MEDIASTINUM AND WESTON:  Within normal limits. CHEST WALL AND LOWER NECK:   No masses, suspicious enhancement or lymphadenopathy. VISUALIZED STRUCTURES IN THE UPPER ABDOMEN:  Small hepatic and right renal cysts. BONES:  Within normal limits.     Impression: No  recurrent mass or evidence of metastatic disease. Workstation performed: [id number]     CT abdomen pelvis w wo contrast    Result Date: 1/26/2024  Narrative: CT ABDOMEN AND PELVIS WITHOUT AND WITH IV CONTRAST INDICATION:   D17.71: Benign lipomatous neoplasm of kidney R10.11: Right upper quadrant pain. Per Hematology Oncology clinical notes, history of resection of right upper back intramuscular mass 11/2021. Pathology positive for atypical lipomatous tumor. History of right partial nephrectomy 5/2/2022 for angiomyolipoma. COMPARISON: 10/7/2022 MR. 11/30/2021 CT from [facility] TECHNIQUE:  CT examination of the abdomen and pelvis was performed.   Axial, sagittal, and coronal 2D reformatted images were created from the source data and submitted for interpretation. Radiation dose length product (DLP) for this visit:  1994 mGy-cm .  This examination, like all CT scans performed in the [facility], was performed utilizing techniques to minimize radiation dose exposure, including the use of iterative reconstruction and automated exposure control. Triple phase contrast-enhanced technique used. IV Contrast:  50 mL of iohexol (OMNIPAQUE) 100 mL of iohexol (OMNIPAQUE) Enteric Contrast:  Enteric contrast was administered. FINDINGS: ABDOMEN LOWER CHEST: Dependent atelectasis. LIVER/BILIARY TREE: Chronic, tiny cyst adjacent to the gallbladder fossa. Otherwise normal morphology, enhancement and bile duct caliber. GALLBLADDER:  Within normal limits. SPLEEN:  Within normal limits. PANCREAS:  Within normal limits. ADRENAL GLANDS:  Within normal limits. KIDNEYS/URETERS: Chronic, tiny cortical cysts. Similar small right lower pole cortical scar from partial nephrectomy. No recurrent or new suspicious mass. Normal renal enhancement. Normal appearance of the collecting systems. STOMACH AND BOWEL:  Within normal limits. APPENDIX:  Within normal limits. ABDOMINOPELVIC CAVITY:  No abnormal air, fluid or  enlarged lymph nodes. VESSELS: Within normal limits. PELVIS: REPRODUCTIVE ORGANS: Similar enlarged, fibroid uterus. No adnexal masses. URINARY BLADDER:  Within normal limits. ABDOMINAL WALL/INGUINAL REGIONS: Normal appearance. No soft tissue masses. BONES: Mild degenerative changes.     Impression: No evidence of recurrent or new renal or soft tissue masses. Workstation performed: [id number]     I personally reviewed and interpreted the above laboratory and imaging data.    Discussion/Summary: This aida  59 y/o female who presents today for sarcoma surveillance.  At this time there is no evidence of disease recurrence by imaging or physical exam. We will plan to repeat MRI in 6 months and we will see her again at that time for another clinical exam.  With regard to her new GI symptoms, CT did not show any abnormalities.  I will review her MRI imaging with Dr Phillips, specifically to see if there are any noticeable changes within the pancreas.  I will contact the patient with the outcome of this discussion.  Should she continue to notice weight loss and stool issues while taking the digestive enzymes, I recommend she have a colonoscopy performed. She is agreeable to the plan, all questions have been answered.

## 2024-02-21 PROBLEM — J06.9 VIRAL UPPER RESPIRATORY TRACT INFECTION: Status: RESOLVED | Noted: 2024-01-29 | Resolved: 2024-02-21

## 2024-03-15 ENCOUNTER — OFFICE VISIT (OUTPATIENT)
Dept: GASTROENTEROLOGY | Facility: CLINIC | Age: 59
End: 2024-03-15
Payer: COMMERCIAL

## 2024-03-15 VITALS
HEIGHT: 64 IN | BODY MASS INDEX: 20.49 KG/M2 | TEMPERATURE: 98.5 F | OXYGEN SATURATION: 98 % | HEART RATE: 79 BPM | DIASTOLIC BLOOD PRESSURE: 78 MMHG | WEIGHT: 120 LBS | SYSTOLIC BLOOD PRESSURE: 122 MMHG

## 2024-03-15 DIAGNOSIS — R63.4 WEIGHT LOSS: ICD-10-CM

## 2024-03-15 DIAGNOSIS — K86.81 EXOCRINE PANCREATIC INSUFFICIENCY: Primary | ICD-10-CM

## 2024-03-15 DIAGNOSIS — R19.4 CHANGE IN BOWEL HABIT: ICD-10-CM

## 2024-03-15 PROCEDURE — 99213 OFFICE O/P EST LOW 20 MIN: CPT | Performed by: PHYSICIAN ASSISTANT

## 2024-03-15 NOTE — PROGRESS NOTES
[facility] - Outpatient Follow-up Note  Salazar Coughlin 59 y.o. female MRN: [id number]  Encounter: [id number]          ASSESSMENT AND PLAN:      1. Exocrine pancreatic insufficiency  2. Change in bowel habit  3. Weight loss  She is on Creon 24,000 units with meals  Her pain is less, Bms are more regular and she has gained 4lbs    Agreed we would recheck lipase level in April  If improved will stop PERT for 2-3 weeks and then repeat 72hr fecal fat collection    ______________________________________________________________________    SUBJECTIVE: 59-year-old female with history of liposarcoma of the back and angiomyolipoma of the kidney who presents for follow-up of pancreatic insufficiency.  At her last appointment 1 month ago she was reporting symptoms of change in bowel habits, abdominal pain and weight loss which began approximately 2 weeks prior to that visit.  She discussed her symptoms with her family doctor who did blood work and noted an undetectable lipase level.  Because of this he sent her for a stool for fecal fat which was noted to be significantly elevated.  A follow-up CT scan with and without contrast noted a normal-appearing pancreas.  Because of this especially because of the weight loss that she had noted we started pancreatic enzymes at her last appointment.  She reports improvement in symptoms since starting the enzymes.  She has gained at least 4 pounds.  She reports that her abdominal pain is less and her bowel movements are more regular.  She never had severe diarrhea but admits that she was noting floating stools.    REVIEW OF SYSTEMS IS OTHERWISE NEGATIVE.      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 Removed 11/04/2021  Last Assessment & Plan:  Formatting of this note might be different from the original. S/p excision of  "intramuscular large right upper back mass on 11/4/21.  Reviewed her surgical pathology result with the patient.  And then, we discussed a need for additional workup such CT chest/abdomen/and pelvis to make sure that    Cancer (HCC)     4/15/22 Pt reports right kidney cancer     GERD (gastroesophageal reflux disease)     4/15/22 Pt reports on occasion - takes no medications -reports using TUMS as needed    Glaucoma     History of HPV infection     Hyperlipidemia     4/15/22 Pt reports cholesterol is borderline - on no medications at this time    Renal lesion     Seasonal allergies     4/15/22 Pt reports has shortness of breath only with seasonal allergies \"tightness with breathing reported with allergies \"    Subarachnoid hemorrhage (HCC) 10/30/2014    Telangiectasias 02/04/2021    Vasomotor rhinitis 02/05/2021    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     Past Surgical History:   Procedure Laterality Date    CERVIX SURGERY      Cervical cyst removed / also for HPV    COLONOSCOPY      DILATION AND CURETTAGE OF UTERUS      EGD      KIDNEY SURGERY Right     MT LAPAROSCOPY SURG PARTIAL NEPHRECTOMY Right 05/02/2022    Procedure: ROBOTIC LAPAROSCOPIC PARTIAL NEPHRECTOMY, INTRAOPERATIVE ULTRASOUND WITH INTERPRETATION;  Surgeon: Michele Ramos MD;  [facility];  Service: Urology    SOFT TISSUE MASS EXCISION  11/2021    liposarcoma    UTERINE FIBROID SURGERY      4/15/22 Pt reports had fibroid removed - indicated cervical area      Social History   Social History     Substance and Sexual Activity   Alcohol Use Never    Comment: Denies any former or current use of alcohol      Social History     Substance and Sexual Activity   Drug Use Never    Comment: Denies any former or current drug use      Social History     Tobacco Use   Smoking Status Never   Smokeless Tobacco Never   Tobacco Comments    Denies any former or current use      Family History   Problem Relation Age of Onset    No Known " "Problems Mother     Alzheimer's disease Father     No Known Problems Sister     No Known Problems Daughter        Meds/Allergies       Current Outpatient Medications:     alendronate (FOSAMAX) 70 mg tablet    fluticasone (FLONASE) 50 mcg/act nasal spray    latanoprost (XALATAN) 0.005 % ophthalmic solution    pancrelipase, Lip-Prot-Amyl, (CREON) 24,000 units    Allergies   Allergen Reactions    Other Sneezing     Cats, dogs, seasonal - pt reports hard to breath and coughing with these allergies            Objective     Blood pressure 122/78, pulse 79, temperature 98.5 °F (36.9 °C), height 5' 4\" (1.626 m), weight 54.4 kg (120 lb), SpO2 98%. Body mass index is 20.6 kg/m².      PHYSICAL EXAM:      General Appearance:   Alert, cooperative, no distress   HEENT:   Normocephalic, atraumatic, anicteric.     Neck:  Supple, symmetrical, trachea midline   Lungs:   Clear to auscultation bilaterally; no rales, rhonchi or wheezing; respirations unlabored    Heart::   Regular rate and rhythm; no murmur, rub, or gallop.   Abdomen:   Soft, non-tender, non-distended; normal bowel sounds; no masses, no organomegaly    Genitalia:   Deferred    Rectal:   Deferred    Extremities:  No cyanosis, clubbing or edema    Pulses:  2+ and symmetric    Skin:  No jaundice, rashes, or lesions    Lymph nodes:  No palpable cervical lymphadenopathy        Lab Results:   No visits with results within 1 Day(s) from this visit.   Latest known visit with results is:   Appointment on 01/26/2024   Component Date Value    Fat,(Fecal Lipids)Qn 01/26/2024 9.5 (H)     Stool Weight 01/26/2024 440          Radiology Results:   Mammo screening bilateral w 3d & cad    Result Date: 3/11/2024  Narrative: This result has an attachment that is not available. HISTORY: Patient is 59 years old and is seen for a screening mammogram of both breasts. No relevant medical history has been documented for this patient. Surgical history includes breast cyst aspiration. No relevant " family history has been documented for this patient. No relevant hormone history has been documented for this patient. FILMS COMPARED: The present examination has been compared to prior imaging studies. MAMMOGRAM FINDINGS: A minimum of two views were obtained. 3D tomosynthesis was performed. Computer-aided detection was utilized by the radiologist in the interpretation of this examination. The breasts are heterogeneously dense, which may obscure small masses. No suspicious masses, calcifications or other abnormalities are seen.    Impression: Impression: There is no mammographic evidence of malignancy. BI-RADS Category:  2 - Benign Follow Up Mamm in 1 Yr. is recommended. 5 Year Tyrer-Cuzick 8: 1.51% 10 Year Tyrer-Cuzick 8: 3.08% Lifetime Tyrer-Cuzick 8: 6.5% In patients with a documented history of breast cancer a risk score will not be calculated. Based on the information provided by the patient, risk scores for breast cancer for 5 years, 10 years and lifetime was calculated using both breast density and family history.  Patients should consult with their referring providers regarding the significance of the score, potential need for additional risk assessment and supplemental screening including whole breast ultrasound and MRI Workstation: [id number]  Answers submitted by the patient for this visit:  Abdominal Pain Questionnaire (Submitted on 3/12/2024)  Chief Complaint: Abdominal pain  Chronicity: chronic  Onset: in the past 7 days  Onset quality: gradual  Frequency: every several days  Episode duration: 1 Hours  Progression since onset: gradually improving  Pain location: RUQ  Pain - numeric: 2/10  Pain quality: dull  Radiates to: does not radiate  arthralgias: No  belching: Yes  constipation: No  diarrhea: No  dysuria: No  fever: No  flatus: No  frequency: Yes  headaches: No  hematochezia: No  hematuria: No  melena: No  myalgias: No  nausea: No  weight loss: Yes  vomiting: No  Aggravated by: nothing, being  still  Relieved by: movement  Diagnostic workup: CT scan, GI consult

## 2024-04-18 ENCOUNTER — APPOINTMENT (OUTPATIENT)
Dept: LAB | Facility: HOSPITAL | Age: 59
End: 2024-04-18
Payer: COMMERCIAL

## 2024-04-18 DIAGNOSIS — K86.81 EXOCRINE PANCREATIC INSUFFICIENCY: ICD-10-CM

## 2024-04-18 LAB — LIPASE SERPL-CCNC: 12 U/L (ref 11–82)

## 2024-04-18 PROCEDURE — 83690 ASSAY OF LIPASE: CPT

## 2024-04-18 PROCEDURE — 36415 COLL VENOUS BLD VENIPUNCTURE: CPT

## 2024-05-30 ENCOUNTER — OFFICE VISIT (OUTPATIENT)
Dept: GASTROENTEROLOGY | Facility: CLINIC | Age: 59
End: 2024-05-30
Payer: COMMERCIAL

## 2024-05-30 VITALS
OXYGEN SATURATION: 97 % | HEART RATE: 69 BPM | DIASTOLIC BLOOD PRESSURE: 70 MMHG | WEIGHT: 120 LBS | SYSTOLIC BLOOD PRESSURE: 120 MMHG | TEMPERATURE: 97.6 F | HEIGHT: 64 IN | BODY MASS INDEX: 20.49 KG/M2

## 2024-05-30 DIAGNOSIS — K86.81 EXOCRINE PANCREATIC INSUFFICIENCY: Primary | ICD-10-CM

## 2024-05-30 PROCEDURE — 99213 OFFICE O/P EST LOW 20 MIN: CPT | Performed by: PHYSICIAN ASSISTANT

## 2024-05-30 NOTE — PROGRESS NOTES
[facility] - Outpatient Follow-up Note  Salazar Coughlin 59 y.o. female MRN: [id number]  Encounter: [id number]          ASSESSMENT AND PLAN:      1. Exocrine pancreatic insufficiency  She continues to feel well on Creon 24,000units with meals  She has gained a few pounds - current weight is 120lbs  Lipase has improved to 12  We will continue for now  Repeat labs in July  Consider stopping at that time  Could repeat fecal fat collection  ______________________________________________________________________    SUBJECTIVE: 59-year-old female with exocrine pancreatic insufficiency who presents for follow-up.  She continues to feel well on Creon 24,000 units with meals.  She has gained a few pounds since starting this.  She has some mild right-sided abdominal pain which comes and goes.  She denies any nausea or vomiting.  She is tolerating a diet.  She has been cautious not to overeat fatty foods.  Her bowel movements are mostly regular.  She has no rectal bleeding or melena.  She has no fevers or chills.  Lipase was repeated last month and noted to have improved from less than 6-12.      REVIEW OF SYSTEMS IS OTHERWISE NEGATIVE.      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 Removed 11/04/2021  Last Assessment & Plan:  Formatting of this note might be different from the original. S/p excision of intramuscular large right upper back mass on 11/4/21.  Reviewed her surgical pathology result with the patient.  And then, we discussed a need for additional workup such CT chest/abdomen/and pelvis to make sure that    Cancer (HCC)     4/15/22 Pt reports right kidney cancer     GERD (gastroesophageal reflux disease)     4/15/22 Pt reports on occasion - takes no medications -reports using TUMS as needed    Glaucoma     History of HPV infection     Hyperlipidemia      "4/15/22 Pt reports cholesterol is borderline - on no medications at this time    Renal lesion     Seasonal allergies     4/15/22 Pt reports has shortness of breath only with seasonal allergies \"tightness with breathing reported with allergies \"    Subarachnoid hemorrhage (HCC) 10/30/2014    Telangiectasias 02/04/2021    Vasomotor rhinitis 02/05/2021    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     Past Surgical History:   Procedure Laterality Date    CERVIX SURGERY      Cervical cyst removed / also for HPV    COLONOSCOPY      DILATION AND CURETTAGE OF UTERUS      EGD      KIDNEY SURGERY Right     WA LAPAROSCOPY SURG PARTIAL NEPHRECTOMY Right 05/02/2022    Procedure: ROBOTIC LAPAROSCOPIC PARTIAL NEPHRECTOMY, INTRAOPERATIVE ULTRASOUND WITH INTERPRETATION;  Surgeon: Michele Ramos MD;  Location: [facility];  Service: Urology    SOFT TISSUE MASS EXCISION  11/2021    liposarcoma    UTERINE FIBROID SURGERY      4/15/22 Pt reports had fibroid removed - indicated cervical area      Social History   Social History     Substance and Sexual Activity   Alcohol Use Never    Comment: Denies any former or current use of alcohol      Social History     Substance and Sexual Activity   Drug Use Never    Comment: Denies any former or current drug use      Social History     Tobacco Use   Smoking Status Never   Smokeless Tobacco Never   Tobacco Comments    Denies any former or current use      Family History   Problem Relation Age of Onset    No Known Problems Mother     Alzheimer's disease Father     No Known Problems Sister     No Known Problems Daughter        Meds/Allergies       Current Outpatient Medications:     alendronate (FOSAMAX) 70 mg tablet    fluticasone (FLONASE) 50 mcg/act nasal spray    latanoprost (XALATAN) 0.005 % ophthalmic solution    pancrelipase, Lip-Prot-Amyl, (CREON) 24,000 units    Allergies   Allergen Reactions    Other Sneezing     Cats, dogs, seasonal - pt reports hard to breath and " "coughing with these allergies            Objective     Blood pressure 120/70, pulse 69, temperature 97.6 °F (36.4 °C), temperature source Temporal, height 5' 4\" (1.626 m), weight 54.4 kg (120 lb), SpO2 97%. Body mass index is 20.6 kg/m².      PHYSICAL EXAM:      General Appearance:   Alert, cooperative, no distress   HEENT:   Normocephalic, atraumatic, anicteric.     Neck:  Supple, symmetrical, trachea midline   Lungs:   Clear to auscultation bilaterally; no rales, rhonchi or wheezing; respirations unlabored    Heart::   Regular rate and rhythm; no murmur, rub, or gallop.   Abdomen:   Soft, non-tender, non-distended; normal bowel sounds; no masses, no organomegaly    Genitalia:   Deferred    Rectal:   Deferred    Extremities:  No cyanosis, clubbing or edema    Pulses:  2+ and symmetric    Skin:  No jaundice, rashes, or lesions    Lymph nodes:  No palpable cervical lymphadenopathy        Lab Results:   No visits with results within 1 Day(s) from this visit.   Latest known visit with results is:   Appointment on 04/18/2024   Component Date Value    Lipase 04/18/2024 12          Radiology Results:   No results found.  Answers submitted by the patient for this visit:  Abdominal Pain Questionnaire (Submitted on 5/30/2024)  Chief Complaint: Abdominal pain  Chronicity: recurrent  Onset: in the past 7 days  Onset quality: sudden  Frequency: every several days  Episode duration: 1 Days  Progression since onset: gradually improving  Pain location: RUQ  Pain - numeric: 3/10  Pain quality: cramping  Radiates to: does not radiate  anorexia: No  arthralgias: Yes  belching: No  constipation: No  diarrhea: No  dysuria: No  fever: No  flatus: No  frequency: No  headaches: No  hematochezia: No  hematuria: No  melena: No  myalgias: Yes  nausea: No  weight loss: No  vomiting: No  Aggravated by: nothing  Relieved by: nothing    "

## 2024-07-08 ENCOUNTER — APPOINTMENT (OUTPATIENT)
Age: 59
End: 2024-07-08
Payer: COMMERCIAL

## 2024-07-08 ENCOUNTER — OFFICE VISIT (OUTPATIENT)
Age: 59
End: 2024-07-08
Payer: COMMERCIAL

## 2024-07-08 ENCOUNTER — TELEPHONE (OUTPATIENT)
Dept: ADMINISTRATIVE | Facility: OTHER | Age: 59
End: 2024-07-08

## 2024-07-08 VITALS
SYSTOLIC BLOOD PRESSURE: 114 MMHG | RESPIRATION RATE: 18 BRPM | OXYGEN SATURATION: 97 % | HEART RATE: 74 BPM | DIASTOLIC BLOOD PRESSURE: 74 MMHG | HEIGHT: 64 IN | WEIGHT: 122.6 LBS | TEMPERATURE: 97.1 F | BODY MASS INDEX: 20.93 KG/M2

## 2024-07-08 DIAGNOSIS — E78.2 MIXED HYPERLIPIDEMIA: ICD-10-CM

## 2024-07-08 DIAGNOSIS — R73.03 PREDIABETES: ICD-10-CM

## 2024-07-08 DIAGNOSIS — Z00.00 ADULT GENERAL MEDICAL EXAMINATION: Primary | ICD-10-CM

## 2024-07-08 DIAGNOSIS — K86.81 EXOCRINE PANCREATIC INSUFFICIENCY: ICD-10-CM

## 2024-07-08 LAB
ALBUMIN SERPL BCG-MCNC: 4.2 G/DL (ref 3.5–5)
ALP SERPL-CCNC: 67 U/L (ref 34–104)
ALT SERPL W P-5'-P-CCNC: 20 U/L (ref 7–52)
ANION GAP SERPL CALCULATED.3IONS-SCNC: 11 MMOL/L (ref 4–13)
AST SERPL W P-5'-P-CCNC: 22 U/L (ref 13–39)
BASOPHILS # BLD AUTO: 0.03 THOUSANDS/ÂΜL (ref 0–0.1)
BASOPHILS NFR BLD AUTO: 0 % (ref 0–1)
BILIRUB SERPL-MCNC: 1.16 MG/DL (ref 0.2–1)
BUN SERPL-MCNC: 11 MG/DL (ref 5–25)
CALCIUM SERPL-MCNC: 9.2 MG/DL (ref 8.4–10.2)
CHLORIDE SERPL-SCNC: 108 MMOL/L (ref 96–108)
CHOLEST SERPL-MCNC: 223 MG/DL
CO2 SERPL-SCNC: 27 MMOL/L (ref 21–32)
CREAT SERPL-MCNC: 0.66 MG/DL (ref 0.6–1.3)
EOSINOPHIL # BLD AUTO: 0.17 THOUSAND/ÂΜL (ref 0–0.61)
EOSINOPHIL NFR BLD AUTO: 3 % (ref 0–6)
ERYTHROCYTE [DISTWIDTH] IN BLOOD BY AUTOMATED COUNT: 13.4 % (ref 11.6–15.1)
EST. AVERAGE GLUCOSE BLD GHB EST-MCNC: 123 MG/DL
GFR SERPL CREATININE-BSD FRML MDRD: 96 ML/MIN/1.73SQ M
GLUCOSE P FAST SERPL-MCNC: 89 MG/DL (ref 65–99)
HBA1C MFR BLD: 5.9 %
HCT VFR BLD AUTO: 45.6 % (ref 34.8–46.1)
HDLC SERPL-MCNC: 58 MG/DL
HGB BLD-MCNC: 14.5 G/DL (ref 11.5–15.4)
IMM GRANULOCYTES # BLD AUTO: 0.02 THOUSAND/UL (ref 0–0.2)
IMM GRANULOCYTES NFR BLD AUTO: 0 % (ref 0–2)
LDLC SERPL CALC-MCNC: 135 MG/DL (ref 0–100)
LYMPHOCYTES # BLD AUTO: 2.07 THOUSANDS/ÂΜL (ref 0.6–4.47)
LYMPHOCYTES NFR BLD AUTO: 31 % (ref 14–44)
MCH RBC QN AUTO: 30 PG (ref 26.8–34.3)
MCHC RBC AUTO-ENTMCNC: 31.8 G/DL (ref 31.4–37.4)
MCV RBC AUTO: 94 FL (ref 82–98)
MONOCYTES # BLD AUTO: 0.44 THOUSAND/ÂΜL (ref 0.17–1.22)
MONOCYTES NFR BLD AUTO: 7 % (ref 4–12)
NEUTROPHILS # BLD AUTO: 4.01 THOUSANDS/ÂΜL (ref 1.85–7.62)
NEUTS SEG NFR BLD AUTO: 59 % (ref 43–75)
NRBC BLD AUTO-RTO: 0 /100 WBCS
PLATELET # BLD AUTO: 331 THOUSANDS/UL (ref 149–390)
PMV BLD AUTO: 10.1 FL (ref 8.9–12.7)
POTASSIUM SERPL-SCNC: 3.7 MMOL/L (ref 3.5–5.3)
PROT SERPL-MCNC: 6.9 G/DL (ref 6.4–8.4)
RBC # BLD AUTO: 4.83 MILLION/UL (ref 3.81–5.12)
SODIUM SERPL-SCNC: 146 MMOL/L (ref 135–147)
TRIGL SERPL-MCNC: 152 MG/DL
WBC # BLD AUTO: 6.74 THOUSAND/UL (ref 4.31–10.16)

## 2024-07-08 PROCEDURE — 99396 PREV VISIT EST AGE 40-64: CPT | Performed by: INTERNAL MEDICINE

## 2024-07-08 PROCEDURE — 85025 COMPLETE CBC W/AUTO DIFF WBC: CPT

## 2024-07-08 PROCEDURE — 36415 COLL VENOUS BLD VENIPUNCTURE: CPT

## 2024-07-08 PROCEDURE — 83036 HEMOGLOBIN GLYCOSYLATED A1C: CPT

## 2024-07-08 PROCEDURE — 80053 COMPREHEN METABOLIC PANEL: CPT

## 2024-07-08 PROCEDURE — 80061 LIPID PANEL: CPT

## 2024-07-08 NOTE — ASSESSMENT & PLAN NOTE
Check updated lipid panel. Heart healthy diet.    Nutrition and Diet:  A diet emphasizing intake of vegetables, fruits, legumes, nuts, whole grains, and fish is recommended. A diet containing reduced amounts of cholesterol and sodium can be beneficial.  Replacement of saturated fat with dietary mono- and poly-unsaturated fats can be beneficial.  Minimizing the intake of trans fats, processed meats, refined carbohydrates, and sweetened beverages as part of a heart healthy diet.    Physical Activity:  Engage in at least 150 minutes per week of accumulated moderate intensity or 75 minutes per week of vigorous intensity aerobic physical activity (or an equivalent combination of moderate and vigorous activity)    Engaging in some moderate or vigorous intensity physical activity, even if less than this recommended amount, can be beneficial to reduce cardiovascular risk.    Intensity METS Examples   Sedentary Behavior* 1-1.5 Sitting, reclining, or lying; watching TV   Light 1.6-2.9 Walking slowly, cooking, light house work   Moderate 3.0-5.9 Brisk walking (2.4-4mph), biking 5-9mph, ballroom dancing, active yoga, recreational swimming   Vigorous ?6 Jogging/running, biking ?10mph, singles tennis, swimming laps     *Sedentary behavior is defined as any waking behavior characterized by an energy expenditure ?1.5 metabolic equivalents (METs), while in a sitting, reclining, or lying posture. Standing is a sedentary activity in that it involves ?1.5 METs, but is not considered a component of sedentary behavior; mph indicates miles per hour.    Orders:    Lipid Panel with Direct LDL reflex; Future    CBC and differential; Future    Comprehensive metabolic panel; Future

## 2024-07-08 NOTE — TELEPHONE ENCOUNTER
Upon review of the In Basket request we were able to locate, review, and update the patient chart as requested for DEXA Scan.    Any additional questions or concerns should be emailed to the Practice Liaisons via the appropriate education email address, please do not reply via In Basket.    Thank you  Nils Bhat MA   [facility]

## 2024-07-08 NOTE — PROGRESS NOTES
Assessment & Plan  Adult general medical examination    Mixed hyperlipidemia    Check updated lipid panel. Heart healthy diet.    Nutrition and Diet:  A diet emphasizing intake of vegetables, fruits, legumes, nuts, whole grains, and fish is recommended. A diet containing reduced amounts of cholesterol and sodium can be beneficial.  Replacement of saturated fat with dietary mono- and poly-unsaturated fats can be beneficial.  Minimizing the intake of trans fats, processed meats, refined carbohydrates, and sweetened beverages as part of a heart healthy diet.    Physical Activity:  Engage in at least 150 minutes per week of accumulated moderate intensity or 75 minutes per week of vigorous intensity aerobic physical activity (or an equivalent combination of moderate and vigorous activity)    Engaging in some moderate or vigorous intensity physical activity, even if less than this recommended amount, can be beneficial to reduce cardiovascular risk.    Intensity METS Examples   Sedentary Behavior* 1-1.5 Sitting, reclining, or lying; watching TV   Light 1.6-2.9 Walking slowly, cooking, light house work   Moderate 3.0-5.9 Brisk walking (2.4-4mph), biking 5-9mph, ballroom dancing, active yoga, recreational swimming   Vigorous ?6 Jogging/running, biking ?10mph, singles tennis, swimming laps     *Sedentary behavior is defined as any waking behavior characterized by an energy expenditure ?1.5 metabolic equivalents (METs), while in a sitting, reclining, or lying posture. Standing is a sedentary activity in that it involves ?1.5 METs, but is not considered a component of sedentary behavior; mph indicates miles per hour.    Orders:    Lipid Panel with Direct LDL reflex; Future    CBC and differential; Future    Comprehensive metabolic panel; Future    Prediabetes    Check A1c. Watch carbohydrate intake.    Orders:    Hemoglobin A1C; Future      Depression Screening and Follow-up Plan: Patient was screened for depression during  "today's encounter. They screened negative with a PHQ-2 score of 0.      History of Present Illness     Salazar presents for annual physical. Overall doing well. Tired at times and sleep is broken up through the night.    Review of Systems   Constitutional:  Negative for appetite change, chills, fatigue and fever.   HENT:  Negative for congestion, hearing loss, postnasal drip, rhinorrhea, sore throat, tinnitus and trouble swallowing.    Eyes:  Negative for pain, discharge, redness and visual disturbance.   Respiratory:  Negative for cough, chest tightness, shortness of breath and wheezing.    Cardiovascular:  Negative for chest pain, palpitations and leg swelling.   Gastrointestinal:  Negative for abdominal distention, abdominal pain, blood in stool, constipation, diarrhea, nausea and vomiting.   Endocrine: Negative for cold intolerance, heat intolerance, polydipsia, polyphagia and polyuria.   Genitourinary:  Negative for difficulty urinating, dysuria, frequency, hematuria and urgency.   Musculoskeletal:  Negative for arthralgias, back pain, gait problem, joint swelling, myalgias, neck pain and neck stiffness.   Skin:  Negative for color change and rash.   Neurological:  Negative for dizziness, tremors, seizures, syncope, speech difficulty, weakness, light-headedness, numbness and headaches.   Hematological:  Negative for adenopathy. Does not bruise/bleed easily.   Psychiatric/Behavioral:  Positive for sleep disturbance. Negative for agitation, behavioral problems, confusion, hallucinations and suicidal ideas. The patient is not nervous/anxious.      Objective     /74 (BP Location: Left arm, Patient Position: Sitting, Cuff Size: Standard)   Pulse 74   Temp (!) 97.1 °F (36.2 °C) (Tympanic)   Resp 18   Ht 5' 4\" (1.626 m)   Wt 55.6 kg (122 lb 9.6 oz)   SpO2 97%   BMI 21.04 kg/m²     Physical Exam  Constitutional:       General: She is not in acute distress.     Appearance: She is not ill-appearing.   HENT: "      Right Ear: Tympanic membrane, ear canal and external ear normal. There is no impacted cerumen.      Left Ear: Tympanic membrane, ear canal and external ear normal. There is no impacted cerumen.   Eyes:      General:         Right eye: No discharge.         Left eye: No discharge.      Extraocular Movements: Extraocular movements intact.      Pupils: Pupils are equal, round, and reactive to light.   Cardiovascular:      Rate and Rhythm: Normal rate and regular rhythm.      Heart sounds: No murmur heard.  Pulmonary:      Effort: Pulmonary effort is normal. No respiratory distress.      Breath sounds: No wheezing.   Abdominal:      General: Bowel sounds are normal. There is no distension.      Tenderness: There is no abdominal tenderness.   Musculoskeletal:      Right lower leg: No edema.      Left lower leg: No edema.   Neurological:      Mental Status: She is alert. Mental status is at baseline.      Cranial Nerves: No cranial nerve deficit.      Motor: No weakness.       Matt Jade,

## 2024-07-08 NOTE — PATIENT INSTRUCTIONS
"Patient Education     Routine physical for adults   The Basics   Written by the doctors and editors at [facility]   What is a physical? -- A physical is a routine visit, or \"check-up,\" with your doctor. You might also hear it called a \"wellness visit\" or \"preventive visit.\"  During each visit, the doctor will:   Ask about your physical and mental health   Ask about your habits, behaviors, and lifestyle   Do an exam   Give you vaccines if needed   Talk to you about any medicines you take   Give advice about your health   Answer your questions  Getting regular check-ups is an important part of taking care of your health. It can help your doctor find and treat any problems you have. But it's also important for preventing health problems.  A routine physical is different from a \"sick visit.\" A sick visit is when you see a doctor because of a health concern or problem. Since physicals are scheduled ahead of time, you can think about what you want to ask the doctor.  How often should I get a physical? -- It depends on your age and health. In general, for people age 21 years and older:   If you are younger than 50 years, you might be able to get a physical every 3 years.   If you are 50 years or older, your doctor might recommend a physical every year.  If you have an ongoing health condition, like diabetes or high blood pressure, your doctor will probably want to see you more often.  What happens during a physical? -- In general, each visit will include:   Physical exam - The doctor or nurse will check your height, weight, heart rate, and blood pressure. They will also look at your eyes and ears. They will ask about how you are feeling and whether you have any symptoms that bother you.   Medicines - It's a good idea to bring a list of all the medicines you take to each doctor visit. Your doctor will talk to you about your medicines and answer any questions. Tell them if you are having any side effects that bother you. You " "should also tell them if you are having trouble paying for any of your medicines.   Habits and behaviors - This includes:   Your diet   Your exercise habits   Whether you smoke, drink alcohol, or use drugs   Whether you are sexually active   Whether you feel safe at home  Your doctor will talk to you about things you can do to improve your health and lower your risk of health problems. They will also offer help and support. For example, if you want to quit smoking, they can give you advice and might prescribe medicines. If you want to improve your diet or get more physical activity, they can help you with this, too.   Lab tests, if needed - The tests you get will depend on your age and situation. For example, your doctor might want to check your:   Cholesterol   Blood sugar   Iron level   Vaccines - The recommended vaccines will depend on your age, health, and what vaccines you already had. Vaccines are very important because they can prevent certain serious or deadly infections.   Discussion of screening - \"Screening\" means checking for diseases or other health problems before they cause symptoms. Your doctor can recommend screening based on your age, risk, and preferences. This might include tests to check for:   Cancer, such as breast, prostate, cervical, ovarian, colorectal, prostate, lung, or skin cancer   Sexually transmitted infections, such as chlamydia and gonorrhea   Mental health conditions like depression and anxiety  Your doctor will talk to you about the different types of screening tests. They can help you decide which screenings to have. They can also explain what the results might mean.   Answering questions - The physical is a good time to ask the doctor or nurse questions about your health. If needed, they can refer you to other doctors or specialists, too.  Adults older than 65 years often need other care, too. As you get older, your doctor will talk to you about:   How to prevent falling at " home   Hearing or vision tests   Memory testing   How to take your medicines safely   Making sure that you have the help and support you need at home  All topics are updated as new evidence becomes available and our peer review process is complete.  This topic retrieved from [employer] on: May 02, 2024.  Topic 871535 Version 1.0  Release: 32.4.3 - C32.122  © 2024 UpToDate, Inc. and/or its affiliates. All rights reserved.  Consumer Information Use and Disclaimer   Disclaimer: This generalized information is a limited summary of diagnosis, treatment, and/or medication information. It is not meant to be comprehensive and should be used as a tool to help the user understand and/or assess potential diagnostic and treatment options. It does NOT include all information about conditions, treatments, medications, side effects, or risks that may apply to a specific patient. It is not intended to be medical advice or a substitute for the medical advice, diagnosis, or treatment of a health care provider based on the health care provider's examination and assessment of a patient's specific and unique circumstances. Patients must speak with a health care provider for complete information about their health, medical questions, and treatment options, including any risks or benefits regarding use of medications. This information does not endorse any treatments or medications as safe, effective, or approved for treating a specific patient. UpToDate, Inc. and its affiliates disclaim any warranty or liability relating to this information or the use thereof.The use of this information is governed by the Terms of Use, available at https://www.[employer].com/en/know/clinical-effectiveness-terms. 2024© UpToDate, Inc. and its affiliates and/or licensors. All rights reserved.  Copyright   © 2024 UpToDate, Inc. and/or its affiliates. All rights reserved.

## 2024-07-08 NOTE — TELEPHONE ENCOUNTER
----- Message from Marylu AHUMADA sent at 7/8/2024 10:48 AM EDT -----  Regarding: DXA  07/08/24 10:49 AM    Hello, our patient Salazar Coughlin has had  DEXA Scan completed/performed. Please assist in updating the patient chart by pulling the document from the Media Tab. The date of service is 04/25/24.     Thank you,  Marylu Ayala MA   [facility] [place]

## 2024-07-09 ENCOUNTER — TELEPHONE (OUTPATIENT)
Age: 59
End: 2024-07-09

## 2024-07-09 NOTE — TELEPHONE ENCOUNTER
----- Message from [facility], DO sent at 7/9/2024  7:35 AM EDT -----  Labs are stable from previous. A1c still in pre-diabetic range at 5.9%. Cholesterol is 223 with triglycerides of 152. No medication needed at this time. Heart healthy diet and regular exercise recommended.

## 2024-07-09 NOTE — TELEPHONE ENCOUNTER
Patients GI provider:   LUCITA Morales     Number to return call: ( [phone]     Reason for call: Pt calling for med refill on CREON     Scheduled procedure/appointment date if applicable: Apt/procedure

## 2024-07-10 RX ORDER — PANCRELIPASE 24000; 76000; 120000 [USP'U]/1; [USP'U]/1; [USP'U]/1
CAPSULE, DELAYED RELEASE PELLETS ORAL
Qty: 270 CAPSULE | Refills: 1 | Status: SHIPPED | OUTPATIENT
Start: 2024-07-10

## 2024-07-30 ENCOUNTER — APPOINTMENT (OUTPATIENT)
Dept: LAB | Facility: HOSPITAL | Age: 59
End: 2024-07-30
Payer: COMMERCIAL

## 2024-07-30 DIAGNOSIS — Z85.831 HISTORY OF SARCOMA: ICD-10-CM

## 2024-07-30 DIAGNOSIS — K86.81 EXOCRINE PANCREATIC INSUFFICIENCY: ICD-10-CM

## 2024-07-30 LAB
BUN SERPL-MCNC: 21 MG/DL (ref 5–25)
CREAT SERPL-MCNC: 0.7 MG/DL (ref 0.6–1.3)
GFR SERPL CREATININE-BSD FRML MDRD: 94 ML/MIN/1.73SQ M
LIPASE SERPL-CCNC: <6 U/L (ref 11–82)

## 2024-07-30 PROCEDURE — 84520 ASSAY OF UREA NITROGEN: CPT

## 2024-07-30 PROCEDURE — 83690 ASSAY OF LIPASE: CPT

## 2024-07-30 PROCEDURE — 36415 COLL VENOUS BLD VENIPUNCTURE: CPT

## 2024-07-30 PROCEDURE — 82565 ASSAY OF CREATININE: CPT

## 2024-08-02 ENCOUNTER — OFFICE VISIT (OUTPATIENT)
Dept: GASTROENTEROLOGY | Facility: CLINIC | Age: 59
End: 2024-08-02
Payer: COMMERCIAL

## 2024-08-02 VITALS
BODY MASS INDEX: 21.21 KG/M2 | HEIGHT: 64 IN | DIASTOLIC BLOOD PRESSURE: 82 MMHG | WEIGHT: 124.2 LBS | OXYGEN SATURATION: 98 % | HEART RATE: 90 BPM | TEMPERATURE: 97.3 F | SYSTOLIC BLOOD PRESSURE: 121 MMHG

## 2024-08-02 DIAGNOSIS — R63.4 WEIGHT LOSS: ICD-10-CM

## 2024-08-02 DIAGNOSIS — K86.81 EXOCRINE PANCREATIC INSUFFICIENCY: Primary | ICD-10-CM

## 2024-08-02 PROCEDURE — 99213 OFFICE O/P EST LOW 20 MIN: CPT | Performed by: PHYSICIAN ASSISTANT

## 2024-08-02 NOTE — PROGRESS NOTES
[facility] - Outpatient Follow-up Note  Salazar Coughlin 59 y.o. female MRN: [id number]  Encounter: [id number]          ASSESSMENT AND PLAN:      1. Exocrine pancreatic insufficiency  2. Weight loss  She stopped her PERT for 2 weeks and had repeat labs with a Lipase again noted to be <6 - had been up to 12 on therapy    She otherwise feels well    She prefers to not restart PERT unless necessary given cost    Will complete a full workup to rule out celiac, HH, A1 antitrypsin, gastrinoma (unlikely)  Will check MRI/MRCP to better evaluate PD    ______________________________________________________________________    SUBJECTIVE: 59-year-old female with a history of exocrine pancreatic insufficiency who presents for routine follow-up.  She initially presented in February of this year with reports of 2 weeks of change in her bowel movements and a recent weight loss.  Her PCP had ordered labs noting that her lipase was undetectable and so he followed up with a 72-hour fecal fat collection which was elevated.  CT scan was performed which showed a normal-appearing pancreas.  She has no history of pancreatitis.  She has no history of alcohol abuse.  She was put on pancreatic enzyme replacement therapy and her symptoms quickly improved.  Her bowel movements regulated and she denied any further abdominal pains.  She has gained weight.  She stopped her PERT 2 weeks ago in preparation for repeat labs.  Her repeat labs again show Lipase <6.  Her symptoms have not returned and she is not losing weight.  She is hesitant to restart PERT unless necessary due to cost.       REVIEW OF SYSTEMS IS OTHERWISE NEGATIVE.      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 Removed 11/04/2021  Last Assessment & Plan:  Formatting of this note might be different from the  "original. S/p excision of intramuscular large right upper back mass on 11/4/21.  Reviewed her surgical pathology result with the patient.  And then, we discussed a need for additional workup such CT chest/abdomen/and pelvis to make sure that    Cancer (HCC)     4/15/22 Pt reports right kidney cancer     GERD (gastroesophageal reflux disease)     4/15/22 Pt reports on occasion - takes no medications -reports using TUMS as needed    Glaucoma     History of HPV infection     Hyperlipidemia     4/15/22 Pt reports cholesterol is borderline - on no medications at this time    Renal lesion     Seasonal allergies     4/15/22 Pt reports has shortness of breath only with seasonal allergies \"tightness with breathing reported with allergies \"    Subarachnoid hemorrhage (HCC) 10/30/2014    Telangiectasias 02/04/2021    Vasomotor rhinitis 02/05/2021    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     Past Surgical History:   Procedure Laterality Date    CERVIX SURGERY      Cervical cyst removed / also for HPV    COLONOSCOPY      DILATION AND CURETTAGE OF UTERUS      EGD      KIDNEY SURGERY Right     VA LAPAROSCOPY SURG PARTIAL NEPHRECTOMY Right 05/02/2022    Procedure: ROBOTIC LAPAROSCOPIC PARTIAL NEPHRECTOMY, INTRAOPERATIVE ULTRASOUND WITH INTERPRETATION;  Surgeon: Michele Ramos MD;  Location: [facility];  Service: Urology    SOFT TISSUE MASS EXCISION  11/2021    liposarcoma    UTERINE FIBROID SURGERY      4/15/22 Pt reports had fibroid removed - indicated cervical area      Social History   Social History     Substance and Sexual Activity   Alcohol Use Never    Comment: Denies any former or current use of alcohol      Social History     Substance and Sexual Activity   Drug Use Never    Comment: Denies any former or current drug use      Social History     Tobacco Use   Smoking Status Never   Smokeless Tobacco Never   Tobacco Comments    Denies any former or current use      Family History   Problem Relation " "Age of Onset    No Known Problems Mother     Alzheimer's disease Father     No Known Problems Sister     No Known Problems Daughter        Meds/Allergies       Current Outpatient Medications:     alendronate (FOSAMAX) 70 mg tablet    fluticasone (FLONASE) 50 mcg/act nasal spray    latanoprost (XALATAN) 0.005 % ophthalmic solution    pancrelipase, Lip-Prot-Amyl, (Creon) 24,000 units    Allergies   Allergen Reactions    Other Sneezing     Cats, dogs, seasonal - pt reports hard to breath and coughing with these allergies            Objective     Blood pressure 121/82, pulse 90, temperature (!) 97.3 °F (36.3 °C), temperature source Temporal, height 5' 4\" (1.626 m), weight 56.3 kg (124 lb 3.2 oz), SpO2 98%. Body mass index is 21.32 kg/m².      PHYSICAL EXAM:      General Appearance:   Alert, cooperative, no distress   HEENT:   Normocephalic, atraumatic, anicteric.     Neck:  Supple, symmetrical, trachea midline   Lungs:   Clear to auscultation bilaterally; no rales, rhonchi or wheezing; respirations unlabored    Heart::   Regular rate and rhythm; no murmur, rub, or gallop.   Abdomen:   Soft, non-tender, non-distended; normal bowel sounds; no masses, no organomegaly    Genitalia:   Deferred    Rectal:   Deferred    Extremities:  No cyanosis, clubbing or edema    Pulses:  2+ and symmetric    Skin:  No jaundice, rashes, or lesions    Lymph nodes:  No palpable cervical lymphadenopathy        Lab Results:   No visits with results within 1 Day(s) from this visit.   Latest known visit with results is:   Appointment on 07/30/2024   Component Date Value    BUN 07/30/2024 21     Creatinine 07/30/2024 0.70     eGFR 07/30/2024 94     Lipase 07/30/2024 <6 (L)          Radiology Results:   No results found.  "

## 2024-08-03 ENCOUNTER — APPOINTMENT (OUTPATIENT)
Dept: LAB | Facility: HOSPITAL | Age: 59
End: 2024-08-03
Payer: COMMERCIAL

## 2024-08-03 DIAGNOSIS — R63.4 WEIGHT LOSS: ICD-10-CM

## 2024-08-03 DIAGNOSIS — K86.81 EXOCRINE PANCREATIC INSUFFICIENCY: ICD-10-CM

## 2024-08-03 LAB
FERRITIN SERPL-MCNC: 48 NG/ML (ref 11–307)
FOLATE SERPL-MCNC: >22.3 NG/ML
GLIADIN PEPTIDE IGA SER-ACNC: <0.2 U/ML
GLIADIN PEPTIDE IGA SER-ACNC: NEGATIVE
GLIADIN PEPTIDE IGG SER-ACNC: <0.4 U/ML
GLIADIN PEPTIDE IGG SER-ACNC: NEGATIVE
IGA SERPL-MCNC: 74 MG/DL (ref 66–433)
IRON SATN MFR SERPL: 24 % (ref 15–50)
IRON SERPL-MCNC: 85 UG/DL (ref 50–212)
TIBC SERPL-MCNC: 353 UG/DL (ref 250–450)
TTG IGA SER-ACNC: <0.5 U/ML
TTG IGA SER-ACNC: NEGATIVE
TTG IGG SER-ACNC: <0.8 U/ML
TTG IGG SER-ACNC: NEGATIVE
UIBC SERPL-MCNC: 268 UG/DL (ref 155–355)
VIT B12 SERPL-MCNC: 618 PG/ML (ref 180–914)

## 2024-08-03 PROCEDURE — 83550 IRON BINDING TEST: CPT

## 2024-08-03 PROCEDURE — 82728 ASSAY OF FERRITIN: CPT

## 2024-08-03 PROCEDURE — 84425 ASSAY OF VITAMIN B-1: CPT

## 2024-08-03 PROCEDURE — 83540 ASSAY OF IRON: CPT

## 2024-08-03 PROCEDURE — 82607 VITAMIN B-12: CPT

## 2024-08-03 PROCEDURE — 82652 VIT D 1 25-DIHYDROXY: CPT

## 2024-08-03 PROCEDURE — 82784 ASSAY IGA/IGD/IGG/IGM EACH: CPT

## 2024-08-03 PROCEDURE — 82941 ASSAY OF GASTRIN: CPT

## 2024-08-03 PROCEDURE — 86258 DGP ANTIBODY EACH IG CLASS: CPT

## 2024-08-03 PROCEDURE — 82103 ALPHA-1-ANTITRYPSIN TOTAL: CPT

## 2024-08-03 PROCEDURE — 36415 COLL VENOUS BLD VENIPUNCTURE: CPT

## 2024-08-03 PROCEDURE — 82746 ASSAY OF FOLIC ACID SERUM: CPT

## 2024-08-03 PROCEDURE — 84590 ASSAY OF VITAMIN A: CPT

## 2024-08-03 PROCEDURE — 86364 TISS TRNSGLTMNASE EA IG CLAS: CPT

## 2024-08-04 LAB — A1AT SERPL-MCNC: 108 MG/DL (ref 101–187)

## 2024-08-05 ENCOUNTER — HOSPITAL ENCOUNTER (OUTPATIENT)
Dept: MRI IMAGING | Facility: HOSPITAL | Age: 59
Discharge: HOME | End: 2024-08-05
Payer: COMMERCIAL

## 2024-08-05 DIAGNOSIS — Z85.831 HISTORY OF SARCOMA: ICD-10-CM

## 2024-08-05 PROCEDURE — 71552 MRI CHEST W/O & W/DYE: CPT

## 2024-08-05 PROCEDURE — G1004 CDSM NDSC: HCPCS

## 2024-08-05 PROCEDURE — A9585 GADOBUTROL INJECTION: HCPCS

## 2024-08-05 RX ORDER — GADOBUTROL 604.72 MG/ML
5 INJECTION INTRAVENOUS
Status: COMPLETED | OUTPATIENT
Start: 2024-08-05 | End: 2024-08-05

## 2024-08-05 RX ADMIN — GADOBUTROL 5 ML: 604.72 INJECTION INTRAVENOUS at 10:45

## 2024-08-06 LAB
1,25(OH)2D SERPL-MCNC: 92.3 PG/ML (ref 5–200)
GASTRIN SERPL-MCNC: 21 PG/ML (ref 0–115)

## 2024-08-07 LAB
VIT A SERPL-MCNC: 33.2 UG/DL (ref 22–69.5)
VIT B1 BLD-SCNC: 124.2 NMOL/L (ref 66.5–200)

## 2024-08-18 ENCOUNTER — HOSPITAL ENCOUNTER (OUTPATIENT)
Dept: MRI IMAGING | Facility: HOSPITAL | Age: 59
Discharge: HOME | End: 2024-08-18
Payer: COMMERCIAL

## 2024-08-18 DIAGNOSIS — R63.4 WEIGHT LOSS: ICD-10-CM

## 2024-08-18 DIAGNOSIS — K86.81 EXOCRINE PANCREATIC INSUFFICIENCY: ICD-10-CM

## 2024-08-18 PROCEDURE — 74183 MRI ABD W/O CNTR FLWD CNTR: CPT

## 2024-08-18 PROCEDURE — A9585 GADOBUTROL INJECTION: HCPCS | Performed by: PHYSICIAN ASSISTANT

## 2024-08-18 RX ORDER — GADOBUTROL 604.72 MG/ML
5 INJECTION INTRAVENOUS
Status: COMPLETED | OUTPATIENT
Start: 2024-08-18 | End: 2024-08-18

## 2024-08-18 RX ADMIN — GADOBUTROL 5 ML: 604.72 INJECTION INTRAVENOUS at 16:58

## 2024-09-19 ENCOUNTER — OFFICE VISIT (OUTPATIENT)
Dept: SURGICAL ONCOLOGY | Facility: CLINIC | Age: 59
End: 2024-09-19
Payer: COMMERCIAL

## 2024-09-19 VITALS
HEIGHT: 64 IN | WEIGHT: 122.4 LBS | TEMPERATURE: 97.6 F | OXYGEN SATURATION: 98 % | DIASTOLIC BLOOD PRESSURE: 80 MMHG | SYSTOLIC BLOOD PRESSURE: 122 MMHG | HEART RATE: 82 BPM | RESPIRATION RATE: 15 BRPM | BODY MASS INDEX: 20.9 KG/M2

## 2024-09-19 DIAGNOSIS — Z85.831 ENCOUNTER FOR FOLLOW-UP SURVEILLANCE OF SARCOMA: Primary | ICD-10-CM

## 2024-09-19 DIAGNOSIS — Z85.831 HISTORY OF SARCOMA: ICD-10-CM

## 2024-09-19 DIAGNOSIS — C49.9 LIPOSARCOMA (HCC): ICD-10-CM

## 2024-09-19 DIAGNOSIS — Z08 ENCOUNTER FOR FOLLOW-UP SURVEILLANCE OF SARCOMA: Primary | ICD-10-CM

## 2024-09-19 PROCEDURE — 99214 OFFICE O/P EST MOD 30 MIN: CPT | Performed by: SURGERY

## 2024-09-19 NOTE — LETTER
September 19, 2024     Matt Jade DO  [address]  [address]  [place] [address]    Patient: Salazar Coughlin   YOB: 1965   Date of Visit: 9/19/2024       Dear Dr. Jade:    Thank you for referring Salazar Coughlin to me for evaluation. Below are my notes for this consultation.    If you have questions, please do not hesitate to call me. I look forward to following your patient along with you.         Sincerely,        Heri Phillips MD        CC: No Recipients    Heri Phillips MD  9/19/2024 10:34 AM  Sign when Signing Visit               Surgical Oncology Follow Up       [facility] [place]  [address]  [address]  [place] [address]  [phone]    Salazar Coughlin  1965  [id number]  [facility] [place]  [address]  [address]  [place] [address]  [phone]    1. Encounter for follow-up surveillance of sarcoma  2. History of sarcoma  -     BUN; Future; Expected date: 03/15/2025  -     Creatinine, serum; Future; Expected date: 03/15/2025  -     MRI chest mediastinum wo and w contrast; Future; Expected date: 03/19/2025  3. Liposarcoma (HCC)  Assessment & Plan:  59-year-old female with a marginally excised atypical lipomatous tumor. She is clinically FLORECITA at nearly 3 years.  Her MRI shows no obvious recurrence.  We will repeat her imaging in 6 months.  I will see her again at that time for another clinical exam.  In regard to the abdominal discomfort, the etiology is unclear.  She already follows up with gastroenterology.  Since this is not related to food intake, I suspect this may be musculoskeletal or potentially related to her previous kidney procedure.  She is agreeable to this plan. All her questions were answered.          Chief Complaint   Patient presents with   • Follow-up       Return in about 6  months (around 3/19/2025) for Ofice visit short, Imaging - See orders, with Lorenza.      Oncology History    No history exists.       Staging: Well-differentiated liposarcoma of right back  Treatment history: Excisional biopsy, November 2021 (performed by Dr Hudson, [facility])  Current treatment: Observation                                 Disease status: FLORECITA    History of Present Illness: Patient returns in follow-up of her well-differentiated liposarcoma.  She is doing well.  Her only complaint is of some right sided abdominal discomfort.  This is not related to food.  She was placed on pancreas enzymes for pancreas insufficiency, but she does not have any diarrhea or fat in her stool.  She stopped the enzymes and there was no change.  She is maintaining her weight.  MRI of the chest and mediastinum reveals no evidence of recurrence or metastatic disease.  I personally reviewed the films.    Review of Systems  Complete ROS Surg Onc:   Complete ROS Surg Onc:   Constitutional: The patient denies new or recent history of general fatigue, no recent weight loss, no change in appetite.   Eyes: No complaints of visual problems, no scleral icterus.   ENT: no complaints of ear pain, no hoarseness, no difficulty swallowing,  no tinnitus and no new masses in head, oral cavity, or neck.   Cardiovascular: No complaints of chest pain, no palpitations, no ankle edema.   Respiratory: No complaints of shortness of breath, no cough.   Gastrointestinal: No complaints of jaundice, no bloody stools, no pale stools.   Genitourinary: No complaints of dysuria, no hematuria, no nocturia, no frequent urination, no urethral discharge.   Musculoskeletal: No complaints of weakness, paralysis, joint stiffness or arthralgias.  Integumentary: No complaints of rash, no new lesions.   Neurological: No complaints of convulsions, no seizures, no dizziness.   Hematologic/Lymphatic: No complaints of easy bruising.   Endocrine:  No hot or cold intolerance.  No  "polydipsia, polyphagia, or polyuria.  Allergy/immunology:  No environmental allergies.  No food allergies.  Not immunocompromised.  Skin:  No pallor or rash.  No wound.        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 Removed 11/04/2021  Last Assessment & Plan:  Formatting of this note might be different from the original. S/p excision of intramuscular large right upper back mass on 11/4/21.  Reviewed her surgical pathology result with the patient.  And then, we discussed a need for additional workup such CT chest/abdomen/and pelvis to make sure that   • Cancer (HCC)     4/15/22 Pt reports right kidney cancer    • GERD (gastroesophageal reflux disease)     4/15/22 Pt reports on occasion - takes no medications -reports using TUMS as needed   • Glaucoma    • History of HPV infection    • Hyperlipidemia     4/15/22 Pt reports cholesterol is borderline - on no medications at this time   • Renal lesion    • Seasonal allergies     4/15/22 Pt reports has shortness of breath only with seasonal allergies \"tightness with breathing reported with allergies \"   • Subarachnoid hemorrhage (HCC) 10/30/2014   • Telangiectasias 02/04/2021   • Vasomotor rhinitis 02/05/2021   • 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     Past Surgical History:   Procedure Laterality Date   • CERVIX SURGERY      Cervical cyst removed / also for HPV   • COLONOSCOPY     • DILATION AND CURETTAGE OF UTERUS     • EGD   "   • KIDNEY SURGERY Right    • DE LAPAROSCOPY SURG PARTIAL NEPHRECTOMY Right 05/02/2022    Procedure: ROBOTIC LAPAROSCOPIC PARTIAL NEPHRECTOMY, INTRAOPERATIVE ULTRASOUND WITH INTERPRETATION;  Surgeon: Michele Ramos MD;  [facility];  Service: Urology   • SOFT TISSUE MASS EXCISION  11/2021    liposarcoma   • UTERINE FIBROID SURGERY      4/15/22 Pt reports had fibroid removed - indicated cervical area      Family History   Problem Relation Age of Onset   • No Known Problems Mother    • Alzheimer's disease Father    • No Known Problems Sister    • No Known Problems Daughter      Social History     Socioeconomic History   • Marital status: /Civil Union     Spouse name: Not on file   • Number of children: Not on file   • Years of education: Not on file   • Highest education level: Not on file   Occupational History   • Not on file   Tobacco Use   • Smoking status: Never   • Smokeless tobacco: Never   • Tobacco comments:     Denies any former or current use    Vaping Use   • Vaping status: Never Used   Substance and Sexual Activity   • Alcohol use: Never     Comment: Denies any former or current use of alcohol    • Drug use: Never     Comment: Denies any former or current drug use    • Sexual activity: Not Currently     Birth control/protection: Abstinence     Comment: Reports not active at this time   Other Topics Concern   • Not on file   Social History Narrative   • Not on file     Social Determinants of Health     Financial Resource Strain: Not on file   Food Insecurity: Not on file   Transportation Needs: Not on file   Physical Activity: Inactive (1/26/2021)    Exercise Vital Sign    • Days of Exercise per Week: 0 days    • Minutes of Exercise per Session: 0 min   Stress: No Stress Concern Present (1/26/2021)    [employer] [place] [employer] - Occupational Stress Questionnaire    • Feeling of Stress : Only a little   Social Connections: Unknown (6/18/2024)    Received from [facility]     Social Connections    • How often do you feel lonely or isolated from those around you? (Adult - for ages 18 years and over): Not on file   Intimate Partner Violence: Not on file   Housing Stability: Not on file       Current Outpatient Medications:   •  alendronate (FOSAMAX) 70 mg tablet, TAKE 1 TABLET (70 MG TOTAL) BY MOUTH EVERY 7 DAYS, Disp: 12 tablet, Rfl: 3  •  fluticasone (FLONASE) 50 mcg/act nasal spray, 1 spray into each nostril as needed for rhinitis, Disp: , Rfl:   •  latanoprost (XALATAN) 0.005 % ophthalmic solution, Administer 1 drop to both eyes daily at bedtime  , Disp: , Rfl:   •  pancrelipase, Lip-Prot-Amyl, (Creon) 24,000 units, TAKE ONE CAPSULE BY MOUTH THREE TIMES A DAY WITH MEALS (Patient not taking: Reported on 8/2/2024), Disp: 270 capsule, Rfl: 1  Allergies   Allergen Reactions   • Other Sneezing     Cats, dogs, seasonal - pt reports hard to breath and coughing with these allergies      Vitals:    09/19/24 1015   BP: 122/80   Pulse: 82   Resp: 15   Temp: 97.6 °F (36.4 °C)   SpO2: 98%       Physical Exam  Constitutional: General appearance: The Patient is well-developed and well-nourished who appears the stated age in no acute distress. Patient is pleasant and talkative.     HEENT:  Normocephalic.  Sclerae are anicteric. Mucous membranes are moist. Neck is supple without adenopathy. No JVD.     Chest: The lungs are clear to auscultation.     Cardiac: Heart is regular rate.     Abdomen: Abdomen is soft, non-tender, non-distended and without masses.     Extremities: There is no clubbing or cyanosis. There is no edema.  Symmetric.  Neuro: Grossly nonfocal. Gait is normal.     Lymphatic: No evidence of cervical adenopathy bilaterally.   No evidence of axillary adenopathy bilaterally.    Skin: Warm, anicteric.  No evidence of local recurrence.  Psych:  Patient is pleasant and talkative.  Breasts:        Pathology:  [unfilled]    Labs:      Imaging  No results found.  I personally reviewed and  interpreted the above laboratory and imaging data.

## 2024-09-19 NOTE — PROGRESS NOTES
Surgical Oncology Follow Up       [facility] SURGICAL ONCOLOGY [place]  [address]  [address]  [place] [address]  [phone]    Salazar Coughlin  1965  [id number]  [facility]  [facility] SURGICAL ONCOLOGY [place]  [address]  [address]  [place] [address]  [phone]    1. Encounter for follow-up surveillance of sarcoma  2. History of sarcoma  -     BUN; Future; Expected date: 03/15/2025  -     Creatinine, serum; Future; Expected date: 03/15/2025  -     MRI chest mediastinum wo and w contrast; Future; Expected date: 03/19/2025  3. Liposarcoma (HCC)  Assessment & Plan:  59-year-old female with a marginally excised atypical lipomatous tumor. She is clinically FLORECITA at nearly 3 years.  Her MRI shows no obvious recurrence.  We will repeat her imaging in 6 months.  I will see her again at that time for another clinical exam.  In regard to the abdominal discomfort, the etiology is unclear.  She already follows up with gastroenterology.  Since this is not related to food intake, I suspect this may be musculoskeletal or potentially related to her previous kidney procedure.  She is agreeable to this plan. All her questions were answered.          Chief Complaint   Patient presents with    Follow-up       Return in about 6 months (around 3/19/2025) for Ofice visit short, Imaging - See orders, with Lorenza.      Oncology History    No history exists.       Staging: Well-differentiated liposarcoma of right back  Treatment history: Excisional biopsy, November 2021 (performed by Dr Hudson, [facility])  Current treatment: Observation                                 Disease status: FLORECITA    History of Present Illness: Patient returns in follow-up of her well-differentiated liposarcoma.  She is doing well.  Her only complaint is of some right sided abdominal discomfort.  This is not related to food.  She  was placed on pancreas enzymes for pancreas insufficiency, but she does not have any diarrhea or fat in her stool.  She stopped the enzymes and there was no change.  She is maintaining her weight.  MRI of the chest and mediastinum reveals no evidence of recurrence or metastatic disease.  I personally reviewed the films.    Review of Systems  Complete ROS Surg Onc:   Complete ROS Surg Onc:   Constitutional: The patient denies new or recent history of general fatigue, no recent weight loss, no change in appetite.   Eyes: No complaints of visual problems, no scleral icterus.   ENT: no complaints of ear pain, no hoarseness, no difficulty swallowing,  no tinnitus and no new masses in head, oral cavity, or neck.   Cardiovascular: No complaints of chest pain, no palpitations, no ankle edema.   Respiratory: No complaints of shortness of breath, no cough.   Gastrointestinal: No complaints of jaundice, no bloody stools, no pale stools.   Genitourinary: No complaints of dysuria, no hematuria, no nocturia, no frequent urination, no urethral discharge.   Musculoskeletal: No complaints of weakness, paralysis, joint stiffness or arthralgias.  Integumentary: No complaints of rash, no new lesions.   Neurological: No complaints of convulsions, no seizures, no dizziness.   Hematologic/Lymphatic: No complaints of easy bruising.   Endocrine:  No hot or cold intolerance.  No polydipsia, polyphagia, or polyuria.  Allergy/immunology:  No environmental allergies.  No food allergies.  Not immunocompromised.  Skin:  No pallor or rash.  No wound.        Patient Active Problem List   Diagnosis    Gastroesophageal reflux disease with esophagitis    Angiomyolipoma of right kidney    History of sarcoma    Glaucoma    Mixed hyperlipidemia    Osteopenia with high risk of fracture    Lung nodule    History of subarachnoid hemorrhage    Liposarcoma (HCC)    Change in bowel habit    Allergies    Encounter for follow-up surveillance of sarcoma     Past  "Medical History:   Diagnosis Date    Allergic 2024    Atypical lipomatous tumor (HCC) 11/17/2021    Formatting of this note might be different from the original. Removed 11/04/2021  Last Assessment & Plan:  Formatting of this note might be different from the original. S/p excision of intramuscular large right upper back mass on 11/4/21.  Reviewed her surgical pathology result with the patient.  And then, we discussed a need for additional workup such CT chest/abdomen/and pelvis to make sure that    Cancer (HCC)     4/15/22 Pt reports right kidney cancer     GERD (gastroesophageal reflux disease)     4/15/22 Pt reports on occasion - takes no medications -reports using TUMS as needed    Glaucoma     History of HPV infection     Hyperlipidemia     4/15/22 Pt reports cholesterol is borderline - on no medications at this time    Renal lesion     Seasonal allergies     4/15/22 Pt reports has shortness of breath only with seasonal allergies \"tightness with breathing reported with allergies \"    Subarachnoid hemorrhage (HCC) 10/30/2014    Telangiectasias 02/04/2021    Vasomotor rhinitis 02/05/2021    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     Past Surgical History:   Procedure Laterality Date    CERVIX SURGERY      Cervical cyst removed / also for HPV    COLONOSCOPY      DILATION AND CURETTAGE OF UTERUS      EGD      KIDNEY SURGERY Right     FL LAPAROSCOPY SURG PARTIAL NEPHRECTOMY Right 05/02/2022    Procedure: ROBOTIC LAPAROSCOPIC PARTIAL NEPHRECTOMY, INTRAOPERATIVE ULTRASOUND WITH INTERPRETATION;  Surgeon: Michele Ramos MD;  [facility];  Service: Urology    SOFT TISSUE MASS EXCISION  11/2021    liposarcoma    UTERINE FIBROID SURGERY      4/15/22 Pt reports had fibroid removed - indicated cervical area      Family History   Problem Relation Age of Onset    No Known Problems Mother     Alzheimer's disease Father     No Known Problems Sister     No Known Problems Daughter      Social " History     Socioeconomic History    Marital status: /Civil Union     Spouse name: Not on file    Number of children: Not on file    Years of education: Not on file    Highest education level: Not on file   Occupational History    Not on file   Tobacco Use    Smoking status: Never    Smokeless tobacco: Never    Tobacco comments:     Denies any former or current use    Vaping Use    Vaping status: Never Used   Substance and Sexual Activity    Alcohol use: Never     Comment: Denies any former or current use of alcohol     Drug use: Never     Comment: Denies any former or current drug use     Sexual activity: Not Currently     Birth control/protection: Abstinence     Comment: Reports not active at this time   Other Topics Concern    Not on file   Social History Narrative    Not on file     Social Determinants of Health     Financial Resource Strain: Not on file   Food Insecurity: Not on file   Transportation Needs: Not on file   Physical Activity: Inactive (1/26/2021)    Exercise Vital Sign     Days of Exercise per Week: 0 days     Minutes of Exercise per Session: 0 min   Stress: No Stress Concern Present (1/26/2021)    [employer] - Occupational Stress Questionnaire     Feeling of Stress : Only a little   Social Connections: Unknown (6/18/2024)    Received from [facility]     How often do you feel lonely or isolated from those around you? (Adult - for ages 18 years and over): Not on file   Intimate Partner Violence: Not on file   Housing Stability: Not on file       Current Outpatient Medications:     alendronate (FOSAMAX) 70 mg tablet, TAKE 1 TABLET (70 MG TOTAL) BY MOUTH EVERY 7 DAYS, Disp: 12 tablet, Rfl: 3    fluticasone (FLONASE) 50 mcg/act nasal spray, 1 spray into each nostril as needed for rhinitis, Disp: , Rfl:     latanoprost (XALATAN) 0.005 % ophthalmic solution, Administer 1 drop to both eyes daily at bedtime  , Disp: , Rfl:     pancrelipase,  Lip-Prot-Amyl, (Creon) 24,000 units, TAKE ONE CAPSULE BY MOUTH THREE TIMES A DAY WITH MEALS (Patient not taking: Reported on 8/2/2024), Disp: 270 capsule, Rfl: 1  Allergies   Allergen Reactions    Other Sneezing     Cats, dogs, seasonal - pt reports hard to breath and coughing with these allergies      Vitals:    09/19/24 1015   BP: 122/80   Pulse: 82   Resp: 15   Temp: 97.6 °F (36.4 °C)   SpO2: 98%       Physical Exam  Constitutional: General appearance: The Patient is well-developed and well-nourished who appears the stated age in no acute distress. Patient is pleasant and talkative.     HEENT:  Normocephalic.  Sclerae are anicteric. Mucous membranes are moist. Neck is supple without adenopathy. No JVD.     Chest: The lungs are clear to auscultation.     Cardiac: Heart is regular rate.     Abdomen: Abdomen is soft, non-tender, non-distended and without masses.     Extremities: There is no clubbing or cyanosis. There is no edema.  Symmetric.  Neuro: Grossly nonfocal. Gait is normal.     Lymphatic: No evidence of cervical adenopathy bilaterally.   No evidence of axillary adenopathy bilaterally.    Skin: Warm, anicteric.  No evidence of local recurrence.  Psych:  Patient is pleasant and talkative.  Breasts:        Pathology:  [unfilled]    Labs:      Imaging  No results found.  I personally reviewed and interpreted the above laboratory and imaging data.

## 2024-11-10 NOTE — ANESTHESIA POSTPROCEDURE EVALUATION
Post-Op Assessment Note    CV Status:  Stable    Pain management: adequate     Mental Status:  Alert and awake   Hydration Status:  Euvolemic   PONV Controlled:  Controlled   Airway Patency:  Patent      Post Op Vitals Reviewed: Yes      Staff: Anesthesiologist         No complications documented      Vitals:    05/02/22 1130 05/02/22 1145 05/02/22 1200 05/02/22 1215   BP: 114/60 152/83 (!) 177/92 170/81   Pulse: 76 78 84 74   Resp: 16 17 14 16   Temp:       TempSrc:       SpO2: 100% 100% 97% 95%
(E4) spontaneous

## 2024-12-11 ENCOUNTER — OFFICE VISIT (OUTPATIENT)
Dept: GASTROENTEROLOGY | Facility: CLINIC | Age: 59
End: 2024-12-11
Payer: COMMERCIAL

## 2024-12-11 VITALS
BODY MASS INDEX: 21 KG/M2 | HEART RATE: 83 BPM | HEIGHT: 64 IN | OXYGEN SATURATION: 96 % | SYSTOLIC BLOOD PRESSURE: 127 MMHG | DIASTOLIC BLOOD PRESSURE: 84 MMHG | TEMPERATURE: 97.1 F | WEIGHT: 123 LBS

## 2024-12-11 DIAGNOSIS — K86.81 EXOCRINE PANCREATIC INSUFFICIENCY: Primary | ICD-10-CM

## 2024-12-11 PROCEDURE — 99213 OFFICE O/P EST LOW 20 MIN: CPT | Performed by: PHYSICIAN ASSISTANT

## 2024-12-11 NOTE — PROGRESS NOTES
Name: Salazar Coughlin      : 1965      MRN: [id number]  Encounter Provider: Sadaf Oates PA-C  Encounter Date: 2024   Encounter department: [facility] [place]  :  Assessment & Plan  Exocrine pancreatic insufficiency  Diagnosed in February of this year  She had been noting a change in her bowel movements, abdominal pain and weight loss  Lipase was noted to be low  Quantitative fecal fat was high  She was initiated on Creon with significant symptom improvement  Weight and improved by early summer    Her lipase was repeated and noted to have improved to 12   Given her lack of symptoms she decided to stop her enzyme supplement and monitor  Her lipase did drop back down to less than 6    She did not develop any recurrent symptoms until she traveled to [place] recently with abdominal pain and gaseousness    Thankfully, her weight is stable    We checked fat-soluble vitamin levels in August which were all normal    Despite significant workup no obvious cause of her EPI has been identified    Her recent symptoms are improving again without restarting enzyme supplements    We agreed to continue to monitor    She is traveling to [facility] next week for 10 days and will take her enzymes with her to use just in case her symptoms return    Repeat labs in February          History of Present Illness   HPI  Salazar Coughlin is a 59 y.o. female with a history of exocrine pancreatic insufficiency who presents for routine follow-up.  Overall she is doing very well.  She remains off of her pancreatic enzyme supplement for over 6 months.  A full panel of labs including fat-soluble vitamins were checked in August and all noted to be normal.  She admits that she recently traveled to [place] for 5 weeks to visit her mom.  While there she was experiencing some bloating gaseousness and irregular bowel movements.  Thankfully her weight has maintained.  She reports that since returning  "home she is starting to feel better.  She is concerned because she has a trip scheduled next week for [facility] and is worried that her symptoms may return while there.  Full workup for her EPI has been pursued noted to be negative for any obvious cause.  History obtained from: patient    Review of Systems  Medical History Reviewed by provider this encounter:  Meds  Problems     .  Past Medical History   Past Medical History:   Diagnosis Date    Allergic 2024    Atypical lipomatous tumor (HCC) 11/17/2021    Formatting of this note might be different from the original. Removed 11/04/2021  Last Assessment & Plan:  Formatting of this note might be different from the original. S/p excision of intramuscular large right upper back mass on 11/4/21.  Reviewed her surgical pathology result with the patient.  And then, we discussed a need for additional workup such CT chest/abdomen/and pelvis to make sure that    Cancer (HCC)     4/15/22 Pt reports right kidney cancer     GERD (gastroesophageal reflux disease)     4/15/22 Pt reports on occasion - takes no medications -reports using TUMS as needed    Glaucoma     History of HPV infection     Hyperlipidemia     4/15/22 Pt reports cholesterol is borderline - on no medications at this time    Renal lesion     Seasonal allergies     4/15/22 Pt reports has shortness of breath only with seasonal allergies \"tightness with breathing reported with allergies \"    Subarachnoid hemorrhage (HCC) 10/30/2014    Telangiectasias 02/04/2021    Vasomotor rhinitis 02/05/2021    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     Past Surgical History:   Procedure Laterality Date    CERVIX SURGERY      Cervical cyst removed / also for HPV    COLONOSCOPY      DILATION AND CURETTAGE OF UTERUS      EGD      KIDNEY SURGERY Right     ND LAPAROSCOPY SURG PARTIAL NEPHRECTOMY Right 05/02/2022    Procedure: ROBOTIC LAPAROSCOPIC PARTIAL NEPHRECTOMY, INTRAOPERATIVE ULTRASOUND WITH INTERPRETATION; "  Surgeon: Michele Ramos MD;  [facility];  Service: Urology    SOFT TISSUE MASS EXCISION  11/2021    liposarcoma    UTERINE FIBROID SURGERY      4/15/22 Pt reports had fibroid removed - indicated cervical area      Family History   Problem Relation Age of Onset    No Known Problems Mother     Alzheimer's disease Father     No Known Problems Sister     No Known Problems Daughter       reports that she has never smoked. She has never used smokeless tobacco. She reports that she does not drink alcohol and does not use drugs.  Current Outpatient Medications on File Prior to Visit   Medication Sig Dispense Refill    alendronate (FOSAMAX) 70 mg tablet TAKE 1 TABLET (70 MG TOTAL) BY MOUTH EVERY 7 DAYS 12 tablet 3    fluticasone (FLONASE) 50 mcg/act nasal spray 1 spray into each nostril as needed for rhinitis      latanoprost (XALATAN) 0.005 % ophthalmic solution Administer 1 drop to both eyes daily at bedtime        [DISCONTINUED] pancrelipase, Lip-Prot-Amyl, (Creon) 24,000 units TAKE ONE CAPSULE BY MOUTH THREE TIMES A DAY WITH MEALS (Patient not taking: Reported on 8/2/2024) 270 capsule 1     No current facility-administered medications on file prior to visit.     Allergies   Allergen Reactions    Other Sneezing     Cats, dogs, seasonal - pt reports hard to breath and coughing with these allergies       Current Outpatient Medications on File Prior to Visit   Medication Sig Dispense Refill    alendronate (FOSAMAX) 70 mg tablet TAKE 1 TABLET (70 MG TOTAL) BY MOUTH EVERY 7 DAYS 12 tablet 3    fluticasone (FLONASE) 50 mcg/act nasal spray 1 spray into each nostril as needed for rhinitis      latanoprost (XALATAN) 0.005 % ophthalmic solution Administer 1 drop to both eyes daily at bedtime        [DISCONTINUED] pancrelipase, Lip-Prot-Amyl, (Creon) 24,000 units TAKE ONE CAPSULE BY MOUTH THREE TIMES A DAY WITH MEALS (Patient not taking: Reported on 8/2/2024) 270 capsule 1     No current facility-administered  "medications on file prior to visit.      Social History     Tobacco Use    Smoking status: Never    Smokeless tobacco: Never    Tobacco comments:     Denies any former or current use    Vaping Use    Vaping status: Never Used   Substance and Sexual Activity    Alcohol use: Never     Comment: Denies any former or current use of alcohol     Drug use: Never     Comment: Denies any former or current drug use     Sexual activity: Not Currently     Birth control/protection: Abstinence     Comment: Reports not active at this time        Objective   /84 (BP Location: Left arm, Patient Position: Sitting, Cuff Size: Standard)   Pulse 83   Temp (!) 97.1 °F (36.2 °C) (Temporal)   Ht 5' 4\" (1.626 m)   Wt 55.8 kg (123 lb)   SpO2 96%   BMI 21.11 kg/m²      Physical Exam      "

## 2024-12-26 ENCOUNTER — OFFICE VISIT (OUTPATIENT)
Age: 59
End: 2024-12-26
Payer: COMMERCIAL

## 2024-12-26 VITALS
OXYGEN SATURATION: 98 % | DIASTOLIC BLOOD PRESSURE: 72 MMHG | WEIGHT: 122 LBS | SYSTOLIC BLOOD PRESSURE: 110 MMHG | TEMPERATURE: 98.2 F | HEART RATE: 94 BPM | BODY MASS INDEX: 20.83 KG/M2 | HEIGHT: 64 IN | RESPIRATION RATE: 16 BRPM

## 2024-12-26 DIAGNOSIS — M85.80 OSTEOPENIA WITH HIGH RISK OF FRACTURE: ICD-10-CM

## 2024-12-26 DIAGNOSIS — R21 RASH: Primary | ICD-10-CM

## 2024-12-26 PROCEDURE — 99213 OFFICE O/P EST LOW 20 MIN: CPT | Performed by: INTERNAL MEDICINE

## 2024-12-26 RX ORDER — ALENDRONATE SODIUM 70 MG/1
70 TABLET ORAL
Qty: 12 TABLET | Refills: 3 | Status: SHIPPED | OUTPATIENT
Start: 2024-12-26 | End: 2025-12-21

## 2024-12-26 RX ORDER — FLUOCINONIDE 0.5 MG/G
CREAM TOPICAL 2 TIMES DAILY
Qty: 60 G | Refills: 3 | Status: SHIPPED | OUTPATIENT
Start: 2024-12-26

## 2024-12-26 NOTE — PROGRESS NOTES
"Name: Salazar Coughlin      : 1965      MRN: [id number]  Encounter Provider: Matt Jade DO  Encounter Date: 2024   Encounter department: [facility] [place]  :  Assessment & Plan  Rash    Psoriasis/dermatitis. Recommend topical steroid cream. If not improving, then has dermatology appt in 2025.    Orders:  •  fluocinonide (LIDEX) 0.05 % cream; Apply topically 2 (two) times a day         History of Present Illness     Rash for the past month or so. Started out as a smaller area and then grew larger around her neck/hairline region. Itchy at times. She has been applying anti-fungal cream to it and wasn't noticing a difference.      Review of Systems   Skin:  Positive for rash.     Objective   /72 (BP Location: Left arm, Patient Position: Sitting, Cuff Size: Standard)   Pulse 94   Temp 98.2 °F (36.8 °C) (Tympanic)   Resp 16   Ht 5' 4\" (1.626 m)   Wt 55.3 kg (122 lb)   SpO2 98%   BMI 20.94 kg/m²      Physical Exam  Skin:     Findings: Rash (back of neck) present.         Matt Jade DO   "

## 2025-02-19 ENCOUNTER — OFFICE VISIT (OUTPATIENT)
Age: 60
End: 2025-02-19
Payer: COMMERCIAL

## 2025-02-19 VITALS — BODY MASS INDEX: 21.28 KG/M2 | TEMPERATURE: 97 F | WEIGHT: 124 LBS

## 2025-02-19 DIAGNOSIS — L70.0 ACNE VULGARIS: ICD-10-CM

## 2025-02-19 DIAGNOSIS — R21 RASH: Primary | ICD-10-CM

## 2025-02-19 PROCEDURE — 99214 OFFICE O/P EST MOD 30 MIN: CPT | Performed by: REGISTERED NURSE

## 2025-02-19 RX ORDER — TRETINOIN 0.25 MG/G
CREAM TOPICAL
Qty: 45 G | Refills: 4 | Status: SHIPPED | OUTPATIENT
Start: 2025-02-19

## 2025-02-19 RX ORDER — CLOBETASOL PROPIONATE 0.5 MG/G
OINTMENT TOPICAL
Qty: 60 G | Refills: 3 | Status: SHIPPED | OUTPATIENT
Start: 2025-02-19

## 2025-02-19 NOTE — PATIENT INSTRUCTIONS
"RASH - possible hypersensitivity reaction    Assessment and Plan:  Based on a thorough discussion of this condition and the management approach to it (including a comprehensive discussion of the known risks, side effects and potential benefits of treatment), the patient (family) agrees to implement the following specific plan:  START clobetasol 0.05% ointment twice daily for 2 weeks; then use twice daily 3 times weekly (Mon, Wed, Fri) for maintenance.  If after one month the rash has not resolved, STOP using clobetasol 0.05% ointment  After one month, send Dr. Escoto a message on [employer] stating if the rash has improved or not - may consider biopsy in the future if rash does not resolve.      ACNE VULGARIS       TODAY'S PLAN:     PRESCRIPTION MANAGEMENT:  Several treatment options were discussed including topical retinoids and their side effects.     Skin Hygiene:      Wash affected areas (face, chest, and back) TWICE A DAY with a mild cleanser such as CeraVe or Cetaphil.  Use only mild cleansers (hypoallergenic and without fragrances) and fragrance free detergent (not \"unscented\" products which contain a masking agent); we discussed avoiding irritants/fragranced products.  Apply a good oil-free facial moisturizer AT LEAST TWO TIMES A DAY \" such as CeraVe or Cetaphil.  Minimize the application of oils and cosmetics to the affected skin.  This includes HAIR PRODUCTS such as \"leave in\" conditioners.  Unless the product specifically states that it \"won't cause acne,\" \"won't clog pores,\" and/or \"is non-comedogenic\" then it may actually CAUSE acne.  If you smoke, STOP. Nicotine increases sebum retention and increased scale within the follicles, forming comedones (blackheads and whiteheads).  Abrasive treatments such as dermabrasion and spa facials may aggravate inflammatory acne.  Do NOT scratch or pick your acne bumps.  The evidence that diet directly affects acne remains weak.  However, diet does affect your " overall health.  Eat plenty of fresh fruit and vegetables.  Avoid protein or amino acid supplements, particularly if they contain leucine. Consider a low-glycemic, low-protein and low-dairy diet.  Be mindful that certain medications may cause of aggravate acne.  Make sure to tell your Dermatologist if you start a new prescription, nutritional supplement, and/or herbal remedy.      MORNING Topical Regimen:      NONE.      EVENING Topical Regimen:      Tretinoin 0.025% cream AT LEAST 1 HOUR BEFORE BEDTIME:  Evenly spread a SINGLE pea-sized amount of this medication over your entire face, avoiding the eyes and corners of the mouth.  Can use a moisturizer after applying this.      SYSTEMIC Strategies:      NONE        MEDICAL DECISION MAKING  Treatment Goal:  Resolution of the CHRONIC condition.       Chronic condition is NOT at treatment goal.  It is progressing along its expected course OR is poorly-controlled.

## 2025-02-19 NOTE — PROGRESS NOTES
"[facility] Note     Patient Name: Salazar Coughlin  Encounter Date: 2/19/25     Have you been cared for by a [facility] in the last 3 years and, if so, which description applies to you?    Yes.  I have been here within the last 3 years, and my medical history has NOT changed since that time.  I am FEMALE/of child-bearing potential.    REVIEW OF SYSTEMS:  Have you recently had or currently have any of the following? No changes in my recent health.   PAST MEDICAL HISTORY:  Have you personally ever had or currently have any of the following?  If \"YES,\" then please provide more detail. No changes in my medical history.   HISTORY OF IMMUNOSUPPRESSION: Do you have a history of any of the following:  Systemic Immunosuppression such as Diabetes, Biologic or Immunotherapy, Chemotherapy, Organ Transplantation, Bone Marrow Transplantation or Prednisone?  No     Answering \"YES\" requires the addition of the dotphrase \"IMMUNOSUPPRESSED\" as the first diagnosis of the patient's visit.   FAMILY HISTORY:  Any \"first degree relatives\" (parent, brother, sister, or child) with the following?    No changes in my family's known health.   PATIENT EXPERIENCE:    Do you want the Dermatologist to perform a COMPLETE skin exam today including a clinical examination under the \"bra and underwear\" areas?  NO  If necessary, do we have your permission to call and leave a detailed message on your Preferred Phone number that includes your specific medical information?  Yes      Allergies   Allergen Reactions    Other Sneezing     Cats, dogs, seasonal - pt reports hard to breath and coughing with these allergies       Current Outpatient Medications:     alendronate (FOSAMAX) 70 mg tablet, TAKE 1 TABLET (70 MG TOTAL) BY MOUTH EVERY 7 DAYS, Disp: 12 tablet, Rfl: 3    fluocinonide (LIDEX) 0.05 % cream, Apply topically 2 (two) times a day, Disp: 60 g, Rfl: 3    fluticasone (FLONASE) 50 mcg/act nasal spray, 1 spray into " each nostril as needed for rhinitis, Disp: , Rfl:     latanoprost (XALATAN) 0.005 % ophthalmic solution, Administer 1 drop to both eyes daily at bedtime  , Disp: , Rfl:         Whom besides the patient is providing clinical information about today's encounter?   NO ADDITIONAL HISTORIAN (patient alone provided history)    Physical Exam and Assessment/Plan by Diagnosis:      RASH - possible hypersensitivity reaction    Physical Exam:  Anatomic Location Affected: right posterior neck  Morphological Description: erythematous plaque with scale  Pertinent Positives:  Pertinent Negatives:      Additional History of Present Condition:  Patient presents for a rash on the neck that has been present for 2 months. She describes the rash as being red, itchy, and she notes that it first appeared when she had a lot bug bites while on vacation. She occasionally uses fluocinonide 0.05% cream, but states that is does not seem to help much. She denies personal or family history of any inflammatory skin conditions.     Assessment and Plan:  Based on a thorough discussion of this condition and the management approach to it (including a comprehensive discussion of the known risks, side effects and potential benefits of treatment), the patient (family) agrees to implement the following specific plan:  Discussed that on clinical exams the rash could be consistent with a localized hypersensitivity reaction vs. Contact dermatitis vs. Eczema vs. Less likely jessner.   START clobetasol 0.05% ointment twice daily for 2 weeks; then use twice daily 3 times weekly (Mon, Wed, Fri) for maintenance.  If after one month the rash has not resolved, STOP using clobetasol 0.05% ointment   After one month, send Dr. Escoto a message on [employer] [facility] [employer] stating if the rash has improved or not - may consider biopsy in the future if rash does not resolve.      RHYTIDES    Physical Exam:  Anatomic Location Affected:  face   Morphological Description:  fine lines    Pertinent Positives:  Pertinent Negatives:    Additional History of Present Condition:  Patient reports and seen on exams    Assessment and Plan:  Based on a thorough discussion of this condition and the management approach to it (including a comprehensive discussion of the known risks, side effects and potential benefits of treatment), the patient (family) agrees to implement the following specific plan:  We prescribed tretinoin 0.025%, apply a pea sized amount to face nightly. Reviewed side effects.        Scribe Attestation      I,:  Beti Lo MA am acting as a scribe while in the presence of the attending physician.:       I,:  Francis Escoto MD personally performed the services described in this documentation    as scribed in my presence.:

## 2025-02-20 ENCOUNTER — OFFICE VISIT (OUTPATIENT)
Dept: GASTROENTEROLOGY | Facility: CLINIC | Age: 60
End: 2025-02-20
Payer: COMMERCIAL

## 2025-02-20 ENCOUNTER — APPOINTMENT (OUTPATIENT)
Dept: LAB | Facility: HOSPITAL | Age: 60
End: 2025-02-20
Payer: COMMERCIAL

## 2025-02-20 VITALS
HEART RATE: 77 BPM | TEMPERATURE: 97.4 F | OXYGEN SATURATION: 99 % | BODY MASS INDEX: 20.66 KG/M2 | HEIGHT: 64 IN | WEIGHT: 121 LBS | SYSTOLIC BLOOD PRESSURE: 119 MMHG | DIASTOLIC BLOOD PRESSURE: 77 MMHG

## 2025-02-20 DIAGNOSIS — K86.81 EXOCRINE PANCREATIC INSUFFICIENCY: ICD-10-CM

## 2025-02-20 DIAGNOSIS — K86.81 EXOCRINE PANCREATIC INSUFFICIENCY: Primary | ICD-10-CM

## 2025-02-20 LAB
25(OH)D3 SERPL-MCNC: 53.4 NG/ML (ref 30–100)
FERRITIN SERPL-MCNC: 42 NG/ML (ref 11–307)
IRON SATN MFR SERPL: 25 % (ref 15–50)
IRON SERPL-MCNC: 94 UG/DL (ref 50–212)
LIPASE SERPL-CCNC: <6 U/L (ref 11–82)
TIBC SERPL-MCNC: 378 UG/DL (ref 250–450)
TRANSFERRIN SERPL-MCNC: 270 MG/DL (ref 203–362)
UIBC SERPL-MCNC: 284 UG/DL (ref 155–355)

## 2025-02-20 PROCEDURE — 84425 ASSAY OF VITAMIN B-1: CPT

## 2025-02-20 PROCEDURE — 82728 ASSAY OF FERRITIN: CPT

## 2025-02-20 PROCEDURE — 83540 ASSAY OF IRON: CPT

## 2025-02-20 PROCEDURE — 83550 IRON BINDING TEST: CPT

## 2025-02-20 PROCEDURE — 82306 VITAMIN D 25 HYDROXY: CPT

## 2025-02-20 PROCEDURE — 83690 ASSAY OF LIPASE: CPT

## 2025-02-20 PROCEDURE — 84590 ASSAY OF VITAMIN A: CPT

## 2025-02-20 PROCEDURE — 99213 OFFICE O/P EST LOW 20 MIN: CPT | Performed by: PHYSICIAN ASSISTANT

## 2025-02-20 PROCEDURE — 36415 COLL VENOUS BLD VENIPUNCTURE: CPT

## 2025-02-20 NOTE — PROGRESS NOTES
Name: Salazar Coughlin      : 1965      MRN: [id number]  Encounter Provider: Sadaf Oates PA-C  Encounter Date: 2025   Encounter department: [facility] [place]  :  Assessment & Plan  Exocrine pancreatic insufficiency  Diagnosed in February of this year  She had been noting a change in her bowel movements, abdominal pain and weight loss  Lipase was noted to be low  Quantitative fecal fat was high  She was initiated on Creon with significant symptom improvement  Weight and improved by early summer     Her lipase was repeated and noted to have improved to 12   Given her lack of symptoms she decided to stop her enzyme supplement and monitor  Her lipase did drop back down to less than 6     She did not develop any recurrent symptoms until she traveled to [place] last year with abdominal pain and gaseousness    Her weight remains stable  Her symptoms improved spontaneously    Since that time she has mostly continued to feel well  She reports some mild discomfort and gurgling in her abdomen    Will update labs  We will continue to monitor           History of Present Illness   HPI  Salazar Coughlin is a 60 y.o. female with a history of exocrine pancreatic insufficiency who presents for routine follow-up.  Overall she is doing quite well.  She remains off her pancreatic enzyme supplement for more than 9 months.  While taking pancreatic enzyme supplements last year her lipase levels did improve from undetectable to 12.  She had initially been losing weight prior to her diagnosis but she has since regained and has remained stable.  She traveled to [place] at the end of last year and she did experience some symptoms while there however they quickly improved when she returned.  She recently traveled to [facility] and reports that she felt well while there.  Her weight has remained stable.  Her last set of labs are from August of last year.      Review of Systems      "  Objective   Ht 5' 4\" (1.626 m)   BMI 21.28 kg/m²      Physical Exam      "

## 2025-02-23 LAB — VIT A SERPL-MCNC: 42.7 UG/DL (ref 22–69.5)

## 2025-02-24 ENCOUNTER — OFFICE VISIT (OUTPATIENT)
Age: 60
End: 2025-02-24
Payer: COMMERCIAL

## 2025-02-24 VITALS
SYSTOLIC BLOOD PRESSURE: 116 MMHG | TEMPERATURE: 97.6 F | BODY MASS INDEX: 21.37 KG/M2 | HEIGHT: 64 IN | RESPIRATION RATE: 18 BRPM | OXYGEN SATURATION: 99 % | HEART RATE: 86 BPM | DIASTOLIC BLOOD PRESSURE: 74 MMHG | WEIGHT: 125.2 LBS

## 2025-02-24 DIAGNOSIS — R07.0 THROAT PAIN IN ADULT: Primary | ICD-10-CM

## 2025-02-24 DIAGNOSIS — C49.9 LIPOSARCOMA (HCC): ICD-10-CM

## 2025-02-24 DIAGNOSIS — R21 RASH: ICD-10-CM

## 2025-02-24 PROBLEM — K21.00 GASTROESOPHAGEAL REFLUX DISEASE WITH ESOPHAGITIS: Status: RESOLVED | Noted: 2019-11-11 | Resolved: 2025-02-24

## 2025-02-24 PROCEDURE — 99214 OFFICE O/P EST MOD 30 MIN: CPT | Performed by: INTERNAL MEDICINE

## 2025-02-24 NOTE — PROGRESS NOTES
Name: Salazar Coughlin      : 1965      MRN: [id number]  Encounter Provider: Matt Jade DO  Encounter Date: 2025   Encounter department: [facility] [place]    Assessment & Plan  Throat pain in adult    Throat pain x 1 year. Feels it on the right side of her throat. No physical exam abnormalities. May be related to allergies, but has to be looked into further. Provided a referral to ENT.    Orders:  •  Ambulatory Referral to Otolaryngology; Future    Liposarcoma (HCC)    Previously underwent surgery. No evidence of recurrence.    Rash    Would use high potency steroid cream as prescribed. Follow up with dermatology.      Depression Screening and Follow-up Plan: Patient was screened for depression during today's encounter. They screened negative with a PHQ-2 score of 0.      History of Present Illness     History of Present Illness  The patient presents for evaluation of right-sided throat pain.    She has been experiencing chronic right-sided throat pain, which occasionally radiates to her ear and chest. The pain is internal, described as a persistent sensation of soreness and swelling, without any associated dysphagia. She also reports an intermittent dry cough, similar to an allergic reaction, and a scratchy sensation in her throat. She has a history of allergies but does not take any allergy medication due to perceived ineffectiveness. She speculates that her symptoms may be stress-related. She has not sought recent ENT consultation, with her last visit being approximately 3 years ago.    She has a rash, previously evaluated during her last visit. Despite a consultation with a dermatologist, no definitive diagnosis was made. She was prescribed a potent medication, which she has yet to start. A biopsy was suggested if there is no improvement. She has scheduled another appointment with a different dermatologist for a second opinion. She expresses concern about the potential  "contagiousness of the rash and queries if it could be psoriasis.    ALLERGIES  The patient has allergies.     Review of Systems - see HPI    Objective   /74 (BP Location: Left arm, Patient Position: Sitting, Cuff Size: Standard)   Pulse 86   Temp 97.6 °F (36.4 °C) (Tympanic)   Resp 18   Ht 5' 4\" (1.626 m)   Wt 56.8 kg (125 lb 3.2 oz)   SpO2 99%   BMI 21.49 kg/m²     Physical Exam  Constitutional:       General: She is not in acute distress.     Appearance: She is not ill-appearing.   HENT:      Right Ear: Tympanic membrane, ear canal and external ear normal. There is no impacted cerumen.      Left Ear: Tympanic membrane, ear canal and external ear normal. There is no impacted cerumen.   Neck:      Vascular: No carotid bruit.   Cardiovascular:      Rate and Rhythm: Normal rate and regular rhythm.      Heart sounds: No murmur heard.  Pulmonary:      Effort: Pulmonary effort is normal. No respiratory distress.      Breath sounds: No wheezing.   Abdominal:      General: Bowel sounds are normal. There is no distension.      Tenderness: There is no abdominal tenderness.   Musculoskeletal:      Cervical back: No rigidity or tenderness.      Right lower leg: No edema.      Left lower leg: No edema.   Lymphadenopathy:      Cervical: No cervical adenopathy.   Skin:     Findings: Rash (back of forehead) present.   Neurological:      Mental Status: She is alert.       Matt Jade, DO   "

## 2025-02-25 ENCOUNTER — RESULTS FOLLOW-UP (OUTPATIENT)
Dept: OTHER | Facility: HOSPITAL | Age: 60
End: 2025-02-25

## 2025-02-25 LAB — VIT B1 BLD-SCNC: 126.3 NMOL/L (ref 66.5–200)

## 2025-03-19 ENCOUNTER — HOSPITAL ENCOUNTER (OUTPATIENT)
Dept: MRI IMAGING | Facility: HOSPITAL | Age: 60
Discharge: HOME | End: 2025-03-19
Attending: SURGERY
Payer: COMMERCIAL

## 2025-03-19 DIAGNOSIS — Z85.831 HISTORY OF SARCOMA: ICD-10-CM

## 2025-03-19 PROCEDURE — A9585 GADOBUTROL INJECTION: HCPCS | Performed by: SURGERY

## 2025-03-19 PROCEDURE — 71552 MRI CHEST W/O & W/DYE: CPT

## 2025-03-19 RX ORDER — GADOBUTROL 604.72 MG/ML
5 INJECTION INTRAVENOUS
Status: COMPLETED | OUTPATIENT
Start: 2025-03-19 | End: 2025-03-19

## 2025-03-19 RX ADMIN — GADOBUTROL 5 ML: 604.72 INJECTION INTRAVENOUS at 13:58

## 2025-04-03 ENCOUNTER — OFFICE VISIT (OUTPATIENT)
Dept: SURGICAL ONCOLOGY | Facility: CLINIC | Age: 60
End: 2025-04-03
Payer: COMMERCIAL

## 2025-04-03 VITALS
TEMPERATURE: 97.7 F | BODY MASS INDEX: 21.17 KG/M2 | SYSTOLIC BLOOD PRESSURE: 110 MMHG | OXYGEN SATURATION: 97 % | DIASTOLIC BLOOD PRESSURE: 74 MMHG | WEIGHT: 124 LBS | RESPIRATION RATE: 15 BRPM | HEART RATE: 90 BPM | HEIGHT: 64 IN

## 2025-04-03 DIAGNOSIS — Z85.831 ENCOUNTER FOR FOLLOW-UP SURVEILLANCE OF SARCOMA: Primary | ICD-10-CM

## 2025-04-03 DIAGNOSIS — Z08 ENCOUNTER FOR FOLLOW-UP SURVEILLANCE OF SARCOMA: Primary | ICD-10-CM

## 2025-04-03 DIAGNOSIS — Z85.831 HISTORY OF SARCOMA: ICD-10-CM

## 2025-04-03 PROCEDURE — 99214 OFFICE O/P EST MOD 30 MIN: CPT | Performed by: SURGERY

## 2025-04-03 NOTE — PROGRESS NOTES
Surgical Oncology Follow Up       [facility]  [address]  [address]  [place] [address]  [phone]    Salazar Coughlin  1965  [id number]  [facility]  [facility]  [address]  [address]  [place] [address]  [phone]    1. Encounter for follow-up surveillance of sarcoma  2. History of sarcoma  Assessment & Plan:  60-year-old female with a marginally excised atypical lipomatous tumor. She is clinically FLORECITA at nearly 3-1/2 years.  Her MRI shows no obvious recurrence.  We will repeat her imaging in 6 months.  I will see her again at that time for another clinical exam.  She is agreeable to this plan. All her questions were answered.   Orders:  -     BUN; Future; Expected date: 10/01/2025  -     Creatinine, serum; Future; Expected date: 10/01/2025  -     MRI chest mediastinum wo and w contrast; Future; Expected date: 10/03/2025         Chief Complaint   Patient presents with    Follow-up       Return in about 6 months (around 10/3/2025) for Ofice visit short, Imaging - See orders, with Lorenza.      Oncology History    No history exists.       Staging: Well-differentiated liposarcoma of right back  Treatment history: Excisional biopsy, November 2021 (performed by Dr Hudson, [facility])  Current treatment: Observation                                 Disease status: FLORECITA    History of Present Illness: Returns in follow-up.  She is doing well at this time with no complaints.  She does have a tender lump on the right side of her neck that comes and goes.  She currently does not feel anything now.  She thinks this may be related to an occasional sore throat.  MRI of the chest and mediastinum revealed no evidence of recurrence.  I personally reviewed the films.    Review of Systems  Complete ROS Surg Onc:   Complete ROS Surg Onc:    Constitutional: The patient denies new or recent history of general fatigue, no recent weight loss, no change in appetite.   Eyes: No complaints of visual problems, no scleral icterus.   ENT: no complaints of ear pain, no hoarseness, no difficulty swallowing,  no tinnitus and no new masses in head, oral cavity, or neck.   Cardiovascular: No complaints of chest pain, no palpitations, no ankle edema.   Respiratory: No complaints of shortness of breath, no cough.   Gastrointestinal: No complaints of jaundice, no bloody stools, no pale stools.   Genitourinary: No complaints of dysuria, no hematuria, no nocturia, no frequent urination, no urethral discharge.   Musculoskeletal: No complaints of weakness, paralysis, joint stiffness or arthralgias.  Integumentary: No complaints of rash, no new lesions.   Neurological: No complaints of convulsions, no seizures, no dizziness.   Hematologic/Lymphatic: No complaints of easy bruising.   Endocrine:  No hot or cold intolerance.  No polydipsia, polyphagia, or polyuria.  Allergy/immunology:  No environmental allergies.  No food allergies.  Not immunocompromised.  Skin:  No pallor or rash.  No wound.        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 Removed 11/04/2021  Last Assessment & Plan:  Formatting of this note might be different from the original. S/p excision of intramuscular large right upper back mass on 11/4/21.  Reviewed her surgical pathology result with the patient.  And then, we discussed a need for  "additional workup such CT chest/abdomen/and pelvis to make sure that    Cancer (HCC)     4/15/22 Pt reports right kidney cancer     GERD (gastroesophageal reflux disease)     4/15/22 Pt reports on occasion - takes no medications -reports using TUMS as needed    Glaucoma     History of HPV infection     Hyperlipidemia     4/15/22 Pt reports cholesterol is borderline - on no medications at this time    Psoriasis     Renal lesion     Seasonal allergies     4/15/22 Pt reports has shortness of breath only with seasonal allergies \"tightness with breathing reported with allergies \"    Subarachnoid hemorrhage (HCC) 10/30/2014    Telangiectasias 02/04/2021    Vasomotor rhinitis 02/05/2021    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     Past Surgical History:   Procedure Laterality Date    CERVIX SURGERY      Cervical cyst removed / also for HPV    COLONOSCOPY      DILATION AND CURETTAGE OF UTERUS      EGD      KIDNEY SURGERY Right     UT LAPAROSCOPY SURG PARTIAL NEPHRECTOMY Right 05/02/2022    Procedure: ROBOTIC LAPAROSCOPIC PARTIAL NEPHRECTOMY, INTRAOPERATIVE ULTRASOUND WITH INTERPRETATION;  Surgeon: Michele Ramos MD;  Location: [facility];  Service: Urology    SOFT TISSUE MASS EXCISION  11/2021    liposarcoma    UTERINE FIBROID SURGERY      4/15/22 Pt reports had fibroid removed - indicated cervical area      Family History   Problem Relation Age of Onset    No Known Problems Mother     Alzheimer's disease Father     No Known Problems Sister     No Known Problems Daughter      Social History     Socioeconomic History    Marital status: /Civil Union     Spouse name: Not on file    Number of children: Not on file    Years of education: Not on file    Highest education level: Not on file   Occupational History    Not on file   Tobacco Use    Smoking status: Never    Smokeless tobacco: Never    Tobacco comments:     Denies any former or current use    Vaping Use    Vaping status: Never Used "   Substance and Sexual Activity    Alcohol use: Never     Comment: Denies any former or current use of alcohol     Drug use: Never     Comment: Denies any former or current drug use     Sexual activity: Not Currently     Birth control/protection: Abstinence     Comment: Reports not active at this time   Other Topics Concern    Not on file   Social History Narrative    Not on file     Social Drivers of Health     Financial Resource Strain: Not on file   Food Insecurity: Not on file   Transportation Needs: Not on file   Physical Activity: Inactive (1/26/2021)    Exercise Vital Sign     Days of Exercise per Week: 0 days     Minutes of Exercise per Session: 0 min   Stress: No Stress Concern Present (1/26/2021)    [employer] - Occupational Stress Questionnaire     Feeling of Stress : Only a little   Social Connections: Unknown (6/18/2024)    Received from [employer]     How often do you feel lonely or isolated from those around you? (Adult - for ages 18 years and over): Not on file   Intimate Partner Violence: Not on file   Housing Stability: Not on file       Current Outpatient Medications:     alendronate (FOSAMAX) 70 mg tablet, TAKE 1 TABLET (70 MG TOTAL) BY MOUTH EVERY 7 DAYS, Disp: 12 tablet, Rfl: 3    fluticasone (FLONASE) 50 mcg/act nasal spray, 1 spray into each nostril as needed for rhinitis, Disp: , Rfl:     latanoprost (XALATAN) 0.005 % ophthalmic solution, Administer 1 drop to both eyes daily at bedtime  , Disp: , Rfl:     clobetasol (TEMOVATE) 0.05 % ointment, Apply to affected area twice daily for 2 weeks and then taper to twice a day on Mondays, Wednesdays and Fridays. (Patient not taking: Reported on 4/3/2025), Disp: 60 g, Rfl: 3    tretinoin (RETIN-A) 0.025 % cream, Apply pea sized amount to entire face nightly and follow with moisturizer (Patient not taking: Reported on 4/3/2025), Disp: 45 g, Rfl: 4  Allergies   Allergen Reactions    Other Sneezing      Cats, dogs, seasonal - pt reports hard to breath and coughing with these allergies      Vitals:    04/03/25 1032   BP: 110/74   Pulse: 90   Resp: 15   Temp: 97.7 °F (36.5 °C)   SpO2: 97%       Physical Exam  Constitutional: General appearance: The Patient is well-developed and well-nourished who appears the stated age in no acute distress. Patient is pleasant and talkative.     HEENT:  Normocephalic.  Sclerae are anicteric. Mucous membranes are moist. Neck is supple without adenopathy. No JVD.     Chest: The lungs are clear to auscultation.     Cardiac: Heart is regular rate.     Extremities: There is no clubbing or cyanosis. There is no edema.  Symmetric.  Neuro: Grossly nonfocal. Gait is normal.     Lymphatic: No evidence of cervical adenopathy bilaterally.   No evidence of axillary adenopathy bilaterally. No evidence of inguinal adenopathy bilaterally.     Skin: Warm, anicteric.  I did not appreciate any recurrence.    psych:  Patient is pleasant and talkative.  Breasts:        Pathology:  [unfilled]    Labs:      Imaging  MRI chest mediastinum wo and w contrast  Result Date: 3/25/2025  Narrative: MRI CHEST- WITH AND WITHOUT CONTRAST INDICATION:   Z85.831: Personal history of malignant neoplasm of soft tissue. History of resection of atypical lipomatous tumor of the right upper back 11/4/2021. COMPARISON: 8/5/2024, 1/30/2024 and 7/15/2023. TECHNIQUE:  Multiplanar MR using multiple pulse sequences. IV Contrast:  5 mL of Gadobutrol injection FINDINGS: LUNGS:  Limited evaluation. No evidence of mass or consolidation. PLEURA:  Within normal limits. HEART/GREAT VESSELS: Within normal limits. No acute change. MEDIASTINUM AND WESTON: Within normal limits. CHEST WALL AND LOWER NECK:   No mass or abnormal enhancement. Right upper back marker indicating surgical site. VISUALIZED STRUCTURES IN THE UPPER ABDOMEN: Unchanged tiny hepatic and renal cysts. Spleen, pancreas, adrenal glands and gastrointestinal structures are  within normal limits. No bile or pancreatic duct dilation. OSSEOUS STRUCTURES: No suspicious lesions.     Impression: No evidence of recurrent neoplasm. Workstation performed: [id number]     Mammo screening bilateral w 3d and cad  Result Date: 3/12/2025  Narrative: HISTORY: Patient is 60 years old and is seen for a screening mammogram of both breasts performed on 3/12/25. No relevant medical history has been documented for this patient. Surgical history includes breast cyst aspiration. No relevant family history has been documented for this patient. No relevant hormone history has been documented for this patient. FILMS COMPARED: The present examination has been compared to prior imaging studies. MAMMOGRAM FINDINGS: A minimum of two views were obtained. 3D tomosynthesis was performed. Computer-aided detection was utilized by the radiologist in the interpretation of this examination. The breasts are heterogeneously dense, which may obscure small masses. No suspicious masses, calcifications or other abnormalities are seen.    Impression: There is no mammographic evidence of malignancy. BI-RADS Category:  1 - Negative Return to Screening is recommended. Lifetime Tyrer-Cuzick 8: 6.3% In patients with a documented history of breast cancer, male patients, patients legal sex is unknown or with an age less than 19 or 85 and greater a risk score will not be calculated. Based on the information provided by the patient, risk scores for breast cancer for 5 years, 10 years and lifetime was calculated using both breast density and family history.  Patients should consult with their referring providers regarding the significance of the score, potential need for additional risk assessment and supplemental screening including whole breast ultrasound and MRI MRN: [id number]   Patient Name: Susana Coughlin This exam was performed at: [facility] [address] [place] [id number]  [phone] Workstation: [id number]    I personally reviewed and interpreted the above laboratory and imaging data.

## 2025-04-03 NOTE — LETTER
April 3, 2025     Matt Jade DO  [address]  [address]  [place] [address]    Patient: Salazar Coughlin   YOB: 1965   Date of Visit: 4/3/2025       Dear Dr. Matt Jade DO:    Thank you for referring Salazar Couglhin to me for evaluation. Below are my notes for this consultation.    If you have questions, please do not hesitate to call me. I look forward to following your patient along with you.         Sincerely,        Heri Phillips MD        CC: No Recipients    Heri Phillips MD  4/3/2025 10:50 AM  Sign when Signing Visit               Surgical Oncology Follow Up       [facility] [place]  [address]  [address]  [facility] [place] [address]  [phone]    Salazar Coughlin  1965  [id number]  [facility] [place]  [address]  [address]  [facility] [place] [address]  [phone]    1. Encounter for follow-up surveillance of sarcoma  2. History of sarcoma  Assessment & Plan:  60-year-old female with a marginally excised atypical lipomatous tumor. She is clinically FLORECITA at nearly 3-1/2 years.  Her MRI shows no obvious recurrence.  We will repeat her imaging in 6 months.  I will see her again at that time for another clinical exam.  She is agreeable to this plan. All her questions were answered.   Orders:  -     BUN; Future; Expected date: 10/01/2025  -     Creatinine, serum; Future; Expected date: 10/01/2025  -     MRI chest mediastinum wo and w contrast; Future; Expected date: 10/03/2025         Chief Complaint   Patient presents with   • Follow-up       Return in about 6 months (around 10/3/2025) for Ofice visit short, Imaging - See orders, with Lorenza.      Oncology History    No history exists.       Staging: Well-differentiated liposarcoma of right back  Treatment history: Excisional biopsy, November 2021 (performed by   DERREK Hudson)  Current treatment: Observation                                 Disease status: FLORECITA    History of Present Illness: Returns in follow-up.  She is doing well at this time with no complaints.  She does have a tender lump on the right side of her neck that comes and goes.  She currently does not feel anything now.  She thinks this may be related to an occasional sore throat.  MRI of the chest and mediastinum revealed no evidence of recurrence.  I personally reviewed the films.    Review of Systems  Complete ROS Surg Onc:   Complete ROS Surg Onc:   Constitutional: The patient denies new or recent history of general fatigue, no recent weight loss, no change in appetite.   Eyes: No complaints of visual problems, no scleral icterus.   ENT: no complaints of ear pain, no hoarseness, no difficulty swallowing,  no tinnitus and no new masses in head, oral cavity, or neck.   Cardiovascular: No complaints of chest pain, no palpitations, no ankle edema.   Respiratory: No complaints of shortness of breath, no cough.   Gastrointestinal: No complaints of jaundice, no bloody stools, no pale stools.   Genitourinary: No complaints of dysuria, no hematuria, no nocturia, no frequent urination, no urethral discharge.   Musculoskeletal: No complaints of weakness, paralysis, joint stiffness or arthralgias.  Integumentary: No complaints of rash, no new lesions.   Neurological: No complaints of convulsions, no seizures, no dizziness.   Hematologic/Lymphatic: No complaints of easy bruising.   Endocrine:  No hot or cold intolerance.  No polydipsia, polyphagia, or polyuria.  Allergy/immunology:  No environmental allergies.  No food allergies.  Not immunocompromised.  Skin:  No pallor or rash.  No wound.        Patient Active Problem List   Diagnosis   • Angiomyolipoma of right kidney   • History of sarcoma   • Glaucoma   • Mixed hyperlipidemia   • Osteopenia with high risk of fracture   • Lung nodule   • History of subarachnoid  "hemorrhage   • Liposarcoma (HCC)   • Change in bowel habit   • Allergies   • Encounter for follow-up surveillance of sarcoma     Past Medical History:   Diagnosis Date   • Acne    • Allergic 2024   • Atypical lipomatous tumor (HCC) 11/17/2021    Formatting of this note might be different from the original. Removed 11/04/2021  Last Assessment & Plan:  Formatting of this note might be different from the original. S/p excision of intramuscular large right upper back mass on 11/4/21.  Reviewed her surgical pathology result with the patient.  And then, we discussed a need for additional workup such CT chest/abdomen/and pelvis to make sure that   • Cancer (HCC)     4/15/22 Pt reports right kidney cancer    • GERD (gastroesophageal reflux disease)     4/15/22 Pt reports on occasion - takes no medications -reports using TUMS as needed   • Glaucoma    • History of HPV infection    • Hyperlipidemia     4/15/22 Pt reports cholesterol is borderline - on no medications at this time   • Psoriasis    • Renal lesion    • Seasonal allergies     4/15/22 Pt reports has shortness of breath only with seasonal allergies \"tightness with breathing reported with allergies \"   • Subarachnoid hemorrhage (HCC) 10/30/2014   • Telangiectasias 02/04/2021   • Vasomotor rhinitis 02/05/2021   • 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     Past Surgical History:   Procedure Laterality Date   • CERVIX SURGERY      Cervical cyst removed / also for HPV   • COLONOSCOPY     • DILATION AND CURETTAGE OF UTERUS     • EGD     • KIDNEY SURGERY Right    • TX LAPAROSCOPY SURG PARTIAL NEPHRECTOMY Right 05/02/2022    Procedure: ROBOTIC LAPAROSCOPIC PARTIAL NEPHRECTOMY, INTRAOPERATIVE ULTRASOUND WITH INTERPRETATION;  Surgeon: Michele Ramos MD;  Location: [facility];  Service: Urology   • SOFT TISSUE MASS EXCISION  11/2021    liposarcoma   • UTERINE FIBROID SURGERY      4/15/22 Pt reports had fibroid removed - indicated cervical area  "     Family History   Problem Relation Age of Onset   • No Known Problems Mother    • Alzheimer's disease Father    • No Known Problems Sister    • No Known Problems Daughter      Social History     Socioeconomic History   • Marital status: /Civil Union     Spouse name: Not on file   • Number of children: Not on file   • Years of education: Not on file   • Highest education level: Not on file   Occupational History   • Not on file   Tobacco Use   • Smoking status: Never   • Smokeless tobacco: Never   • Tobacco comments:     Denies any former or current use    Vaping Use   • Vaping status: Never Used   Substance and Sexual Activity   • Alcohol use: Never     Comment: Denies any former or current use of alcohol    • Drug use: Never     Comment: Denies any former or current drug use    • Sexual activity: Not Currently     Birth control/protection: Abstinence     Comment: Reports not active at this time   Other Topics Concern   • Not on file   Social History Narrative   • Not on file     Social Drivers of Health     Financial Resource Strain: Not on file   Food Insecurity: Not on file   Transportation Needs: Not on file   Physical Activity: Inactive (1/26/2021)    Exercise Vital Sign    • Days of Exercise per Week: 0 days    • Minutes of Exercise per Session: 0 min   Stress: No Stress Concern Present (1/26/2021)    [employer] - Occupational Stress Questionnaire    • Feeling of Stress : Only a little   Social Connections: Unknown (6/18/2024)    Received from [employer]    Social [place]    • How often do you feel lonely or isolated from those around you? (Adult - for ages 18 years and over): Not on file   Intimate Partner Violence: Not on file   Housing Stability: Not on file       Current Outpatient Medications:   •  alendronate (FOSAMAX) 70 mg tablet, TAKE 1 TABLET (70 MG TOTAL) BY MOUTH EVERY 7 DAYS, Disp: 12 tablet, Rfl: 3  •  fluticasone (FLONASE) 50 mcg/act nasal spray, 1  spray into each nostril as needed for rhinitis, Disp: , Rfl:   •  latanoprost (XALATAN) 0.005 % ophthalmic solution, Administer 1 drop to both eyes daily at bedtime  , Disp: , Rfl:   •  clobetasol (TEMOVATE) 0.05 % ointment, Apply to affected area twice daily for 2 weeks and then taper to twice a day on Mondays, Wednesdays and Fridays. (Patient not taking: Reported on 4/3/2025), Disp: 60 g, Rfl: 3  •  tretinoin (RETIN-A) 0.025 % cream, Apply pea sized amount to entire face nightly and follow with moisturizer (Patient not taking: Reported on 4/3/2025), Disp: 45 g, Rfl: 4  Allergies   Allergen Reactions   • Other Sneezing     Cats, dogs, seasonal - pt reports hard to breath and coughing with these allergies      Vitals:    04/03/25 1032   BP: 110/74   Pulse: 90   Resp: 15   Temp: 97.7 °F (36.5 °C)   SpO2: 97%       Physical Exam  Constitutional: General appearance: The Patient is well-developed and well-nourished who appears the stated age in no acute distress. Patient is pleasant and talkative.     HEENT:  Normocephalic.  Sclerae are anicteric. Mucous membranes are moist. Neck is supple without adenopathy. No JVD.     Chest: The lungs are clear to auscultation.     Cardiac: Heart is regular rate.     Extremities: There is no clubbing or cyanosis. There is no edema.  Symmetric.  Neuro: Grossly nonfocal. Gait is normal.     Lymphatic: No evidence of cervical adenopathy bilaterally.   No evidence of axillary adenopathy bilaterally. No evidence of inguinal adenopathy bilaterally.     Skin: Warm, anicteric.  I did not appreciate any recurrence.    psych:  Patient is pleasant and talkative.  Breasts:        Pathology:  [unfilled]    Labs:      Imaging  MRI chest mediastinum wo and w contrast  Result Date: 3/25/2025  Narrative: MRI CHEST- WITH AND WITHOUT CONTRAST INDICATION:   Z85.831: Personal history of malignant neoplasm of soft tissue. History of resection of atypical lipomatous tumor of the right upper back  11/4/2021. COMPARISON: 8/5/2024, 1/30/2024 and 7/15/2023. TECHNIQUE:  Multiplanar MR using multiple pulse sequences. IV Contrast:  5 mL of Gadobutrol injection FINDINGS: LUNGS:  Limited evaluation. No evidence of mass or consolidation. PLEURA:  Within normal limits. HEART/GREAT VESSELS: Within normal limits. No acute change. MEDIASTINUM AND WESTON: Within normal limits. CHEST WALL AND LOWER NECK:   No mass or abnormal enhancement. Right upper back marker indicating surgical site. VISUALIZED STRUCTURES IN THE UPPER ABDOMEN: Unchanged tiny hepatic and renal cysts. Spleen, pancreas, adrenal glands and gastrointestinal structures are within normal limits. No bile or pancreatic duct dilation. OSSEOUS STRUCTURES: No suspicious lesions.     Impression: No evidence of recurrent neoplasm. Workstation performed: [id number]     Mammo screening bilateral w 3d and cad  Result Date: 3/12/2025  Narrative: HISTORY: Patient is 60 years old and is seen for a screening mammogram of both breasts performed on 3/12/25. No relevant medical history has been documented for this patient. Surgical history includes breast cyst aspiration. No relevant family history has been documented for this patient. No relevant hormone history has been documented for this patient. FILMS COMPARED: The present examination has been compared to prior imaging studies. MAMMOGRAM FINDINGS: A minimum of two views were obtained. 3D tomosynthesis was performed. Computer-aided detection was utilized by the radiologist in the interpretation of this examination. The breasts are heterogeneously dense, which may obscure small masses. No suspicious masses, calcifications or other abnormalities are seen.    Impression: There is no mammographic evidence of malignancy. BI-RADS Category:  1 - Negative Return to Screening is recommended. Lifetime Tyrer-Cuzick 8: 6.3% In patients with a documented history of breast cancer, male patients, patients legal sex is unknown or with an age  less than 19 or 85 and greater a risk score will not be calculated. Based on the information provided by the patient, risk scores for breast cancer for 5 years, 10 years and lifetime was calculated using both breast density and family history.  Patients should consult with their referring providers regarding the significance of the score, potential need for additional risk assessment and supplemental screening including whole breast ultrasound and MRI MRN: [id number]   Patient Name: Susana Couhglin This exam was performed at: [facility] [address] [place] [id number] [phone] Workstation: [id number]    I personally reviewed and interpreted the above laboratory and imaging data.

## 2025-04-03 NOTE — ASSESSMENT & PLAN NOTE
60-year-old female with a marginally excised atypical lipomatous tumor. She is clinically FLORECITA at nearly 3-1/2 years.  Her MRI shows no obvious recurrence.  We will repeat her imaging in 6 months.  I will see her again at that time for another clinical exam.  She is agreeable to this plan. All her questions were answered.

## 2025-07-11 ENCOUNTER — APPOINTMENT (OUTPATIENT)
Age: 60
End: 2025-07-11
Attending: INTERNAL MEDICINE
Payer: COMMERCIAL

## 2025-07-11 ENCOUNTER — OFFICE VISIT (OUTPATIENT)
Age: 60
End: 2025-07-11
Payer: COMMERCIAL

## 2025-07-11 VITALS
WEIGHT: 122.6 LBS | OXYGEN SATURATION: 98 % | DIASTOLIC BLOOD PRESSURE: 66 MMHG | RESPIRATION RATE: 16 BRPM | HEART RATE: 69 BPM | HEIGHT: 64 IN | TEMPERATURE: 98.2 F | BODY MASS INDEX: 20.93 KG/M2 | SYSTOLIC BLOOD PRESSURE: 122 MMHG

## 2025-07-11 DIAGNOSIS — Z00.00 ADULT GENERAL MEDICAL EXAMINATION: ICD-10-CM

## 2025-07-11 DIAGNOSIS — M25.50 POLYARTHRALGIA: ICD-10-CM

## 2025-07-11 DIAGNOSIS — E78.2 MIXED HYPERLIPIDEMIA: ICD-10-CM

## 2025-07-11 DIAGNOSIS — Z00.00 ADULT GENERAL MEDICAL EXAMINATION: Primary | ICD-10-CM

## 2025-07-11 LAB
ALBUMIN SERPL BCG-MCNC: 4.1 G/DL (ref 3.5–5)
ALP SERPL-CCNC: 69 U/L (ref 34–104)
ALT SERPL W P-5'-P-CCNC: 22 U/L (ref 7–52)
ANION GAP SERPL CALCULATED.3IONS-SCNC: 10 MMOL/L (ref 4–13)
AST SERPL W P-5'-P-CCNC: 26 U/L (ref 13–39)
BASOPHILS # BLD AUTO: 0.02 THOUSANDS/ÂΜL (ref 0–0.1)
BASOPHILS NFR BLD AUTO: 0 % (ref 0–1)
BILIRUB SERPL-MCNC: 0.85 MG/DL (ref 0.2–1)
BUN SERPL-MCNC: 13 MG/DL (ref 5–25)
CALCIUM SERPL-MCNC: 9.1 MG/DL (ref 8.4–10.2)
CHLORIDE SERPL-SCNC: 107 MMOL/L (ref 96–108)
CHOLEST SERPL-MCNC: 219 MG/DL (ref ?–200)
CO2 SERPL-SCNC: 28 MMOL/L (ref 21–32)
CREAT SERPL-MCNC: 0.61 MG/DL (ref 0.6–1.3)
CRP SERPL QL: <1 MG/L
EOSINOPHIL # BLD AUTO: 0.17 THOUSAND/ÂΜL (ref 0–0.61)
EOSINOPHIL NFR BLD AUTO: 3 % (ref 0–6)
ERYTHROCYTE [DISTWIDTH] IN BLOOD BY AUTOMATED COUNT: 13.5 % (ref 11.6–15.1)
ERYTHROCYTE [SEDIMENTATION RATE] IN BLOOD: 9 MM/HOUR (ref 0–29)
GFR SERPL CREATININE-BSD FRML MDRD: 98 ML/MIN/1.73SQ M
GLUCOSE SERPL-MCNC: 81 MG/DL (ref 65–140)
HCT VFR BLD AUTO: 43.1 % (ref 34.8–46.1)
HDLC SERPL-MCNC: 51 MG/DL
HGB BLD-MCNC: 14.2 G/DL (ref 11.5–15.4)
IMM GRANULOCYTES # BLD AUTO: 0.01 THOUSAND/UL (ref 0–0.2)
IMM GRANULOCYTES NFR BLD AUTO: 0 % (ref 0–2)
LDLC SERPL CALC-MCNC: 129 MG/DL (ref 0–100)
LYMPHOCYTES # BLD AUTO: 1.91 THOUSANDS/ÂΜL (ref 0.6–4.47)
LYMPHOCYTES NFR BLD AUTO: 32 % (ref 14–44)
MCH RBC QN AUTO: 30.2 PG (ref 26.8–34.3)
MCHC RBC AUTO-ENTMCNC: 32.9 G/DL (ref 31.4–37.4)
MCV RBC AUTO: 92 FL (ref 82–98)
MONOCYTES # BLD AUTO: 0.35 THOUSAND/ÂΜL (ref 0.17–1.22)
MONOCYTES NFR BLD AUTO: 6 % (ref 4–12)
NEUTROPHILS # BLD AUTO: 3.44 THOUSANDS/ÂΜL (ref 1.85–7.62)
NEUTS SEG NFR BLD AUTO: 59 % (ref 43–75)
NRBC BLD AUTO-RTO: 0 /100 WBCS
PLATELET # BLD AUTO: 332 THOUSANDS/UL (ref 149–390)
PMV BLD AUTO: 10.3 FL (ref 8.9–12.7)
POTASSIUM SERPL-SCNC: 4 MMOL/L (ref 3.5–5.3)
PROT SERPL-MCNC: 6.6 G/DL (ref 6.4–8.4)
RBC # BLD AUTO: 4.7 MILLION/UL (ref 3.81–5.12)
SODIUM SERPL-SCNC: 145 MMOL/L (ref 135–147)
TRIGL SERPL-MCNC: 195 MG/DL (ref ?–150)
WBC # BLD AUTO: 5.9 THOUSAND/UL (ref 4.31–10.16)

## 2025-07-11 PROCEDURE — 80053 COMPREHEN METABOLIC PANEL: CPT

## 2025-07-11 PROCEDURE — 86140 C-REACTIVE PROTEIN: CPT

## 2025-07-11 PROCEDURE — 36415 COLL VENOUS BLD VENIPUNCTURE: CPT

## 2025-07-11 PROCEDURE — 85652 RBC SED RATE AUTOMATED: CPT

## 2025-07-11 PROCEDURE — 86618 LYME DISEASE ANTIBODY: CPT

## 2025-07-11 PROCEDURE — 85025 COMPLETE CBC W/AUTO DIFF WBC: CPT

## 2025-07-11 PROCEDURE — 80061 LIPID PANEL: CPT

## 2025-07-11 PROCEDURE — 99396 PREV VISIT EST AGE 40-64: CPT | Performed by: INTERNAL MEDICINE

## 2025-07-11 NOTE — PROGRESS NOTES
Adult Annual Physical  Name: Salazar Coughlin      : 1965      MRN: [id number]  Encounter Provider: Matt Jade DO  Encounter Date: 2025   Encounter department: [facility]    :  Assessment & Plan  Adult general medical examination    Orders:  •  CBC and differential; Future  •  Comprehensive metabolic panel; Future    Polyarthralgia    Check further labs    Orders:  •  Lyme Total AB W Reflex to IGM/IGG; Future  •  C-reactive protein; Future  •  Sedimentation rate, automated; Future    Mixed hyperlipidemia    Orders:  •  Lipid Panel with Direct LDL reflex; Future      Preventive Screenings:  - Diabetes Screening: orders placed  - Cholesterol Screening: has hyperlipidemia and orders placed   - Hepatitis C screening: screening up-to-date   - Cervical cancer screening: screening up-to-date   - Breast cancer screening: screening up-to-date   - Colon cancer screening: screening up-to-date   - Lung cancer screening: screening not indicated   - Osteoporosis screening: screening up-to-date     Immunizations:  - Immunizations due: Prevnar 20, Tdap and Zoster (Shingrix)    Counseling/Anticipatory Guidance:  - Alcohol: discussed moderation in alcohol intake and recommendations for healthy alcohol use.   - Drug use: discussed harms of illicit drug use and how it can negatively impact mental/physical health.   - Tobacco use: discussed harms of tobacco use and management options for quitting.   - Dental health: discussed importance of regular tooth brushing, flossing, and dental visits.   - Sexual health: discussed sexually transmitted diseases, partner selection, use of condoms, avoidance of unintended pregnancy, and contraceptive alternatives.   - Diet: discussed recommendations for a healthy/well-balanced diet.   - Exercise: the importance of regular exercise/physical activity was discussed. Recommend exercise 3-5 times per week for at least 30 minutes.   - Injury prevention:  discussed safety/seat belts, safety helmets, smoke detectors, carbon monoxide detectors, and smoking near bedding or upholstery.          History of Present Illness     Adult Annual Physical:  Patient presents for annual physical. She notes joint stiffness and pain in her hands. Bothers her most in the morning.     Review of Systems   Constitutional:  Negative for appetite change, chills, fatigue and fever.   HENT:  Negative for congestion, hearing loss, postnasal drip, rhinorrhea, sore throat, tinnitus and trouble swallowing.    Eyes:  Negative for pain, discharge, redness and visual disturbance.   Respiratory:  Negative for cough, chest tightness, shortness of breath and wheezing.    Cardiovascular:  Negative for chest pain, palpitations and leg swelling.   Gastrointestinal:  Negative for abdominal distention, abdominal pain, blood in stool, constipation, diarrhea, nausea and vomiting.   Endocrine: Negative for cold intolerance, heat intolerance, polydipsia, polyphagia and polyuria.   Genitourinary:  Negative for difficulty urinating, dysuria, frequency, hematuria and urgency.   Musculoskeletal:  Positive for arthralgias. Negative for back pain, gait problem, joint swelling, myalgias, neck pain and neck stiffness.   Skin:  Negative for color change and rash.   Neurological:  Negative for dizziness, tremors, seizures, syncope, speech difficulty, weakness, light-headedness, numbness and headaches.   Hematological:  Negative for adenopathy. Does not bruise/bleed easily.   Psychiatric/Behavioral:  Negative for agitation, behavioral problems, confusion, hallucinations, sleep disturbance and suicidal ideas. The patient is not nervous/anxious.      Medical History Reviewed by provider this encounter:  Tobacco  Allergies  Meds  Problems  Med Hx  Surg Hx  Fam Hx         Objective   /66 (BP Location: Left arm, Patient Position: Sitting, Cuff Size: Standard)   Pulse 69   Temp 98.2 °F (36.8 °C) (Tympanic)    "Resp 16   Ht 5' 4\" (1.626 m)   Wt 55.6 kg (122 lb 9.6 oz)   SpO2 98%   BMI 21.04 kg/m²     Physical Exam  Constitutional:       General: She is not in acute distress.     Appearance: She is not ill-appearing.   HENT:      Right Ear: Tympanic membrane, ear canal and external ear normal. There is no impacted cerumen.      Left Ear: Tympanic membrane, ear canal and external ear normal. There is no impacted cerumen.     Cardiovascular:      Rate and Rhythm: Normal rate and regular rhythm.      Heart sounds: No murmur heard.  Pulmonary:      Effort: Pulmonary effort is normal. No respiratory distress.      Breath sounds: No wheezing.   Abdominal:      General: Bowel sounds are normal. There is no distension.      Tenderness: There is no abdominal tenderness.     Musculoskeletal:         General: No swelling, tenderness or deformity.      Right lower leg: No edema.      Left lower leg: No edema.     Neurological:      Mental Status: She is alert.      Motor: No weakness.      Gait: Gait normal.     Psychiatric:         Mood and Affect: Mood normal.         Behavior: Behavior normal.       Matt Jade DO   "

## 2025-07-11 NOTE — PATIENT INSTRUCTIONS
"Patient Education     Routine physical for adults   The Basics   Written by the doctors and editors at [facility]   What is a physical? -- A physical is a routine visit, or \"check-up,\" with your doctor. You might also hear it called a \"wellness visit\" or \"preventive visit.\"  During each visit, the doctor will:   Ask about your physical and mental health   Ask about your habits, behaviors, and lifestyle   Do an exam   Give you vaccines if needed   Talk to you about any medicines you take   Give advice about your health   Answer your questions  Getting regular check-ups is an important part of taking care of your health. It can help your doctor find and treat any problems you have. But it's also important for preventing health problems.  A routine physical is different from a \"sick visit.\" A sick visit is when you see a doctor because of a health concern or problem. Since physicals are scheduled ahead of time, you can think about what you want to ask the doctor.  How often should I get a physical? -- It depends on your age and health. In general, for people age 21 years and older:   If you are younger than 50 years, you might be able to get a physical every 3 years.   If you are 50 years or older, your doctor might recommend a physical every year.  If you have an ongoing health condition, like diabetes or high blood pressure, your doctor will probably want to see you more often.  What happens during a physical? -- In general, each visit will include:   Physical exam - The doctor or nurse will check your height, weight, heart rate, and blood pressure. They will also look at your eyes and ears. They will ask about how you are feeling and whether you have any symptoms that bother you.   Medicines - It's a good idea to bring a list of all the medicines you take to each doctor visit. Your doctor will talk to you about your medicines and answer any questions. Tell them if you are having any side effects that bother you. You " "should also tell them if you are having trouble paying for any of your medicines.   Habits and behaviors - This includes:   Your diet   Your exercise habits   Whether you smoke, drink alcohol, or use drugs   Whether you are sexually active   Whether you feel safe at home  Your doctor will talk to you about things you can do to improve your health and lower your risk of health problems. They will also offer help and support. For example, if you want to quit smoking, they can give you advice and might prescribe medicines. If you want to improve your diet or get more physical activity, they can help you with this, too.   Lab tests, if needed - The tests you get will depend on your age and situation. For example, your doctor might want to check your:   Cholesterol   Blood sugar   Iron level   Vaccines - The recommended vaccines will depend on your age, health, and what vaccines you already had. Vaccines are very important because they can prevent certain serious or deadly infections.   Discussion of screening - \"Screening\" means checking for diseases or other health problems before they cause symptoms. Your doctor can recommend screening based on your age, risk, and preferences. This might include tests to check for:   Cancer, such as breast, prostate, cervical, ovarian, colorectal, prostate, lung, or skin cancer   Sexually transmitted infections, such as chlamydia and gonorrhea   Mental health conditions like depression and anxiety  Your doctor will talk to you about the different types of screening tests. They can help you decide which screenings to have. They can also explain what the results might mean.   Answering questions - The physical is a good time to ask the doctor or nurse questions about your health. If needed, they can refer you to other doctors or specialists, too.  Adults older than 65 years often need other care, too. As you get older, your doctor will talk to you about:   How to prevent falling at " home   Hearing or vision tests   Memory testing   How to take your medicines safely   Making sure that you have the help and support you need at home  All topics are updated as new evidence becomes available and our peer review process is complete.  This topic retrieved from [employer] on: May 02, 2024.  Topic 516385 Version 1.0  Release: 32.4.3 - C32.122  © 2024 UpToDate, Inc. and/or its affiliates. All rights reserved.  Consumer Information Use and Disclaimer   Disclaimer: This generalized information is a limited summary of diagnosis, treatment, and/or medication information. It is not meant to be comprehensive and should be used as a tool to help the user understand and/or assess potential diagnostic and treatment options. It does NOT include all information about conditions, treatments, medications, side effects, or risks that may apply to a specific patient. It is not intended to be medical advice or a substitute for the medical advice, diagnosis, or treatment of a health care provider based on the health care provider's examination and assessment of a patient's specific and unique circumstances. Patients must speak with a health care provider for complete information about their health, medical questions, and treatment options, including any risks or benefits regarding use of medications. This information does not endorse any treatments or medications as safe, effective, or approved for treating a specific patient. UpToDate, Inc. and its affiliates disclaim any warranty or liability relating to this information or the use thereof.The use of this information is governed by the Terms of Use, available at https://www.[employer].uwer.com/en/know/clinical-effectiveness-terms. 2024© UpToDate, Inc. and its affiliates and/or licensors. All rights reserved.  Copyright   © 2024 UpToDate, Inc. and/or its affiliates. All rights reserved.

## 2025-07-12 LAB — B BURGDOR IGG+IGM SER QL IA: NEGATIVE

## 2025-08-21 ENCOUNTER — OFFICE VISIT (OUTPATIENT)
Dept: GASTROENTEROLOGY | Facility: CLINIC | Age: 60
End: 2025-08-21

## 2025-08-21 VITALS
HEIGHT: 64 IN | HEART RATE: 78 BPM | DIASTOLIC BLOOD PRESSURE: 74 MMHG | TEMPERATURE: 97.6 F | BODY MASS INDEX: 20.66 KG/M2 | OXYGEN SATURATION: 98 % | SYSTOLIC BLOOD PRESSURE: 118 MMHG | WEIGHT: 121 LBS

## 2025-08-21 DIAGNOSIS — R63.4 WEIGHT LOSS: ICD-10-CM

## 2025-08-21 DIAGNOSIS — K86.81 EXOCRINE PANCREATIC INSUFFICIENCY: Primary | ICD-10-CM

## (undated) DEVICE — TISSUE RETRIEVAL SYSTEM: Brand: INZII RETRIEVAL SYSTEM

## (undated) DEVICE — SUT VICRYL 0 UR-6 27 IN J603H

## (undated) DEVICE — FENESTRATED BIPOLAR FORCEPS: Brand: ENDOWRIST

## (undated) DEVICE — INTENDED FOR TISSUE SEPARATION, AND OTHER PROCEDURES THAT REQUIRE A SHARP SURGICAL BLADE TO PUNCTURE OR CUT.: Brand: BARD-PARKER SAFETY BLADES SIZE 11, STERILE

## (undated) DEVICE — BLADELESS OBTURATOR: Brand: WECK VISTA

## (undated) DEVICE — ENDOPATH PNEUMONEEDLE INSUFFLATION NEEDLES WITH LUER LOCK CONNECTORS 120MM: Brand: ENDOPATH

## (undated) DEVICE — PENCIL ELECTROSURG E-Z CLEAN -0035H

## (undated) DEVICE — ADHESIVE SKIN HIGH VISCOSITY EXOFIN 1ML

## (undated) DEVICE — SYRINGE 10ML LL

## (undated) DEVICE — TIP COVER ACCESSORY

## (undated) DEVICE — SURGICEL 2 X 3

## (undated) DEVICE — VIAL DECANTER

## (undated) DEVICE — HEM-O-LOK CLIP CARTRIDGE LARGE DA VINCI SI/XI

## (undated) DEVICE — JP CHANNEL DRAIN 19FR, FULL FLUTES: Brand: JACKSON-PRATT

## (undated) DEVICE — HEAVY DUTY TABLE COVER: Brand: CONVERTORS

## (undated) DEVICE — MONOPOLAR CURVED SCISSORS: Brand: ENDOWRIST

## (undated) DEVICE — JACKSON-PRATT 100CC BULB RESERVOIR: Brand: CARDINAL HEALTH

## (undated) DEVICE — GLOVE SRG BIOGEL ECLIPSE 7

## (undated) DEVICE — 3M™ IOBAN™ 2 ANTIMICROBIAL INCISE DRAPE 6650EZ: Brand: IOBAN™ 2

## (undated) DEVICE — AIRSEAL TUBE SMOKE EVAC LUMENX3 FILTERED

## (undated) DEVICE — PROGRASP FORCEPS: Brand: ENDOWRIST

## (undated) DEVICE — CHLORAPREP HI-LITE 26ML ORANGE

## (undated) DEVICE — LARGE NEEDLE DRIVER: Brand: ENDOWRIST

## (undated) DEVICE — BETHLEHEM MAJOR GENERAL PACK: Brand: CARDINAL HEALTH

## (undated) DEVICE — ARM DRAPE

## (undated) DEVICE — TRAY FOLEY 16FR URIMETER SURESTEP

## (undated) DEVICE — COLUMN DRAPE

## (undated) DEVICE — DRAPE SHEET THREE QUARTER

## (undated) DEVICE — VISUALIZATION SYSTEM: Brand: CLEARIFY

## (undated) DEVICE — SUT PDS II 1 CTX 36 IN Z371T

## (undated) DEVICE — HEMOSTATIC MATRIX SURGIFLO 8ML W/THROMBIN

## (undated) DEVICE — TROCAR PORT ACCESS 12 X120MM W/BLDLS OPTICAL TIP AIRSEAL

## (undated) DEVICE — SUT ETHILON 2-0 PS 18 IN 585H

## (undated) DEVICE — SUT STRATAFIX SPIRAL PDS 2-0 CT-1 45 CM SXPP1B411

## (undated) DEVICE — SUT SILK 0 30 IN A306H

## (undated) DEVICE — ASTOUND STANDARD SURGICAL GOWN, XL: Brand: CONVERTORS

## (undated) DEVICE — 3M™ DURAPORE™ SURGICAL TAPE 1538-3, 3 INCH X 10 YARD (7,5CM X 9,1M), 4 ROLLS/BOX: Brand: 3M™ DURAPORE™

## (undated) DEVICE — NEEDLE 25G X 1 1/2

## (undated) DEVICE — IRRIG ENDO FLO TUBING

## (undated) DEVICE — GLOVE INDICATOR PI UNDERGLOVE SZ 7 BLUE

## (undated) DEVICE — SUT MONOCRYL 4-0 SH 27 IN Y315H

## (undated) DEVICE — SUT VLOC 90 3-0 CV-23 9 IN VLOCM0844

## (undated) DEVICE — CANNULA SEAL